# Patient Record
Sex: FEMALE | Race: WHITE | NOT HISPANIC OR LATINO | Employment: OTHER | URBAN - METROPOLITAN AREA
[De-identification: names, ages, dates, MRNs, and addresses within clinical notes are randomized per-mention and may not be internally consistent; named-entity substitution may affect disease eponyms.]

---

## 2017-08-30 ENCOUNTER — HOSPITAL ENCOUNTER (OUTPATIENT)
Dept: OTHER | Facility: HOSPITAL | Age: 82
Discharge: HOME | End: 2017-08-30
Attending: FAMILY MEDICINE | Admitting: FAMILY MEDICINE

## 2017-09-06 ENCOUNTER — HOSPITAL ENCOUNTER (OUTPATIENT)
Dept: OTHER | Facility: HOSPITAL | Age: 82
Discharge: HOME | End: 2017-09-06
Attending: FAMILY MEDICINE | Admitting: FAMILY MEDICINE

## 2017-10-13 ENCOUNTER — HOSPITAL ENCOUNTER (OUTPATIENT)
Dept: OTHER | Facility: HOSPITAL | Age: 82
Discharge: HOME | End: 2017-10-13
Attending: NEUROLOGICAL SURGERY | Admitting: NEUROLOGICAL SURGERY

## 2017-12-11 ENCOUNTER — HOSPITAL ENCOUNTER (OUTPATIENT)
Dept: RESPIRATORY THERAPY | Facility: HOSPITAL | Age: 82
Discharge: HOME | End: 2017-12-11
Attending: INTERNAL MEDICINE | Admitting: INTERNAL MEDICINE

## 2017-12-14 ENCOUNTER — HOSPITAL ENCOUNTER (OUTPATIENT)
Dept: OTHER | Facility: HOSPITAL | Age: 82
Discharge: HOME | End: 2017-12-14
Attending: INTERNAL MEDICINE | Admitting: INTERNAL MEDICINE

## 2018-11-19 ENCOUNTER — CONVERSION ENCOUNTER (OUTPATIENT)
Dept: FAMILY MEDICINE CLINIC | Facility: CLINIC | Age: 83
End: 2018-11-19

## 2018-11-19 LAB — HEMOCCULT STL QL IA: NEGATIVE

## 2018-11-20 LAB
BASOPHILS # BLD AUTO: 20 CELLS/UL (ref 0–200)
BASOPHILS NFR BLD AUTO: 0.3 %
EOSINOPHIL # BLD AUTO: 0.4 %
EOSINOPHIL # BLD AUTO: 27 CELLS/UL (ref 15–500)
ERYTHROCYTE [DISTWIDTH] IN BLOOD BY AUTOMATED COUNT: 12.3 % (ref 11–15)
HCT VFR BLD AUTO: 28 % (ref 35–45)
HGB BLD-MCNC: 9.6 G/DL (ref 11.7–15.5)
LYMPHOCYTES # BLD AUTO: 2060 CELLS/UL (ref 850–3900)
LYMPHOCYTES NFR BLD AUTO: 30.3 %
MCH RBC QN AUTO: 34 PG (ref 27–33)
MCHC RBC AUTO-ENTMCNC: 34.3 G/DL (ref 32–36)
MCV RBC AUTO: 99.3 FL (ref 80–100)
MONOCYTES # BLD AUTO: 598 CELLS/UL (ref 200–950)
MONOCYTES NFR BLD AUTO: 8.8 %
NEUTROPHILS # BLD AUTO: 4094 CELLS/UL (ref 1500–7800)
NEUTROPHILS NFR BLD AUTO: 60.2 %
PLATELET # BLD AUTO: 225 10*3/UL (ref 140–400)
PMV BLD AUTO: 11 FL (ref 7.5–12.5)
RBC # BLD AUTO: 2.82 MILLION/UL (ref 3.8–5.1)
WBC # BLD AUTO: 6.8 10*3/UL (ref 3.8–10.8)

## 2018-12-17 ENCOUNTER — CONVERSION ENCOUNTER (OUTPATIENT)
Dept: FAMILY MEDICINE CLINIC | Facility: CLINIC | Age: 83
End: 2018-12-17

## 2018-12-18 LAB
BASOPHILS # BLD AUTO: 19 CELLS/UL (ref 0–200)
BASOPHILS NFR BLD AUTO: 0.3 %
EOSINOPHIL # BLD AUTO: 1.3 %
EOSINOPHIL # BLD AUTO: 83 CELLS/UL (ref 15–500)
ERYTHROCYTE [DISTWIDTH] IN BLOOD BY AUTOMATED COUNT: 12.7 % (ref 11–15)
HCT VFR BLD AUTO: 27.3 % (ref 35–45)
HGB BLD-MCNC: 9.2 G/DL (ref 11.7–15.5)
LYMPHOCYTES # BLD AUTO: 2246 CELLS/UL (ref 850–3900)
LYMPHOCYTES NFR BLD AUTO: 35.1 %
MCH RBC QN AUTO: 33.5 PG (ref 27–33)
MCHC RBC AUTO-ENTMCNC: 33.7 G/DL (ref 32–36)
MCV RBC AUTO: 99.3 FL (ref 80–100)
MONOCYTES # BLD AUTO: 582 CELLS/UL (ref 200–950)
MONOCYTES NFR BLD AUTO: 9.1 %
NEUTROPHILS # BLD AUTO: 3469 CELLS/UL (ref 1500–7800)
NEUTROPHILS NFR BLD AUTO: 54.2 %
PLATELET # BLD AUTO: 206 10*3/UL (ref 140–400)
PMV BLD AUTO: 10.7 FL (ref 7.5–12.5)
RBC # BLD AUTO: 2.75 MILLION/UL (ref 3.8–5.1)
WBC # BLD AUTO: 6.4 10*3/UL (ref 3.8–10.8)

## 2019-01-21 ENCOUNTER — CONVERSION ENCOUNTER (OUTPATIENT)
Dept: FAMILY MEDICINE CLINIC | Facility: CLINIC | Age: 84
End: 2019-01-21

## 2019-01-22 LAB
FOLATE SERPL-MCNC: 7.6 NG/ML
IRON SATN MFR SERPL: 24 % (CALC) (ref 11–50)
IRON SERPL-MCNC: 61 MCG/DL (ref 45–160)
UIBC SERPL-MCNC: 251 UG/DL (ref 250–450)
VIT B12 SERPL-MCNC: 521 PG/ML (ref 200–1100)

## 2019-02-22 ENCOUNTER — HOSPITAL ENCOUNTER (OUTPATIENT)
Dept: OTHER | Facility: HOSPITAL | Age: 84
Discharge: HOME | End: 2019-02-22
Attending: FAMILY MEDICINE | Admitting: FAMILY MEDICINE

## 2019-05-21 ENCOUNTER — CONVERSION ENCOUNTER (OUTPATIENT)
Dept: FAMILY MEDICINE CLINIC | Facility: CLINIC | Age: 84
End: 2019-05-21

## 2019-05-22 LAB
BASOPHILS # BLD AUTO: 17 CELLS/UL (ref 0–200)
BASOPHILS NFR BLD AUTO: 0.2 %
EOSINOPHIL # BLD AUTO: 0.5 %
EOSINOPHIL # BLD AUTO: 43 CELLS/UL (ref 15–500)
ERYTHROCYTE [DISTWIDTH] IN BLOOD BY AUTOMATED COUNT: 11.9 % (ref 11–15)
HCT VFR BLD AUTO: 26.5 % (ref 35–45)
HGB BLD-MCNC: 9.1 G/DL (ref 11.7–15.5)
LYMPHOCYTES # BLD AUTO: 2176 CELLS/UL (ref 850–3900)
LYMPHOCYTES NFR BLD AUTO: 25.3 %
MCH RBC QN AUTO: 33.5 PG (ref 27–33)
MCHC RBC AUTO-ENTMCNC: 34.3 G/DL (ref 32–36)
MCV RBC AUTO: 97.4 FL (ref 80–100)
MONOCYTES # BLD AUTO: 585 CELLS/UL (ref 200–950)
MONOCYTES NFR BLD AUTO: 6.8 %
NEUTROPHILS # BLD AUTO: 5779 CELLS/UL (ref 1500–7800)
NEUTROPHILS NFR BLD AUTO: 67.2 %
PLATELET # BLD AUTO: 212 10*3/UL (ref 140–400)
PMV BLD AUTO: 11.4 FL (ref 7.5–12.5)
RBC # BLD AUTO: 2.72 MILLION/UL (ref 3.8–5.1)
WBC # BLD AUTO: 8.6 10*3/UL (ref 3.8–10.8)

## 2019-05-29 ENCOUNTER — HOSPITAL ENCOUNTER (OUTPATIENT)
Dept: OTHER | Facility: HOSPITAL | Age: 84
Discharge: HOME | End: 2019-05-29
Attending: FAMILY MEDICINE | Admitting: FAMILY MEDICINE

## 2019-06-04 ENCOUNTER — HOSPITAL ENCOUNTER (OUTPATIENT)
Dept: ONCOLOGY | Facility: CLINIC | Age: 84
Discharge: HOME | End: 2019-06-04
Attending: INTERNAL MEDICINE | Admitting: INTERNAL MEDICINE

## 2019-06-13 DIAGNOSIS — D64.9 ANEMIA, UNSPECIFIED TYPE: Primary | ICD-10-CM

## 2019-06-19 ENCOUNTER — RESULTS ENCOUNTER (OUTPATIENT)
Dept: ONCOLOGY | Facility: CLINIC | Age: 84
End: 2019-06-19

## 2019-06-19 ENCOUNTER — OFFICE VISIT (OUTPATIENT)
Dept: ONCOLOGY | Facility: CLINIC | Age: 84
End: 2019-06-19

## 2019-06-19 ENCOUNTER — DOCUMENTATION (OUTPATIENT)
Dept: ONCOLOGY | Facility: CLINIC | Age: 84
End: 2019-06-19

## 2019-06-19 VITALS
BODY MASS INDEX: 18.82 KG/M2 | DIASTOLIC BLOOD PRESSURE: 77 MMHG | WEIGHT: 106.2 LBS | TEMPERATURE: 98.1 F | SYSTOLIC BLOOD PRESSURE: 121 MMHG | HEIGHT: 63 IN | HEART RATE: 89 BPM

## 2019-06-19 DIAGNOSIS — D64.9 ANEMIA, UNSPECIFIED TYPE: ICD-10-CM

## 2019-06-19 DIAGNOSIS — R63.4 WEIGHT LOSS, ABNORMAL: ICD-10-CM

## 2019-06-19 DIAGNOSIS — D64.9 ANEMIA, UNSPECIFIED TYPE: Primary | ICD-10-CM

## 2019-06-19 DIAGNOSIS — D75.89 MACROCYTOSIS: ICD-10-CM

## 2019-06-19 LAB
BASOPHILS # BLD AUTO: 0.03 10*3/MM3 (ref 0–0.2)
BASOPHILS NFR BLD AUTO: 0.4 % (ref 0–1.5)
DEPRECATED RDW RBC AUTO: 47.9 FL (ref 37–54)
EOSINOPHIL # BLD AUTO: 0.02 10*3/MM3 (ref 0–0.4)
EOSINOPHIL NFR BLD AUTO: 0.3 % (ref 0.3–6.2)
ERYTHROCYTE [DISTWIDTH] IN BLOOD BY AUTOMATED COUNT: 13.5 % (ref 12.3–15.4)
HCT VFR BLD AUTO: 29 % (ref 34–46.6)
HGB BLD-MCNC: 9.5 G/DL (ref 12–15.9)
LYMPHOCYTES # BLD AUTO: 1.84 10*3/MM3 (ref 0.7–3.1)
LYMPHOCYTES NFR BLD AUTO: 23.1 % (ref 19.6–45.3)
MCH RBC QN AUTO: 33.6 PG (ref 26.6–33)
MCHC RBC AUTO-ENTMCNC: 32.8 G/DL (ref 31.5–35.7)
MCV RBC AUTO: 102.5 FL (ref 79–97)
MONOCYTES # BLD AUTO: 0.69 10*3/MM3 (ref 0.1–0.9)
MONOCYTES NFR BLD AUTO: 8.6 % (ref 5–12)
NEUTROPHILS # BLD AUTO: 5.4 10*3/MM3 (ref 1.7–7)
NEUTROPHILS NFR BLD AUTO: 67.6 % (ref 42.7–76)
PLATELET # BLD AUTO: 237 10*3/MM3 (ref 140–450)
PMV BLD AUTO: 9.6 FL (ref 6–12)
RBC # BLD AUTO: 2.83 10*6/MM3 (ref 3.77–5.28)
WBC NRBC COR # BLD: 7.98 10*3/MM3 (ref 3.4–10.8)

## 2019-06-19 PROCEDURE — 36415 COLL VENOUS BLD VENIPUNCTURE: CPT | Performed by: INTERNAL MEDICINE

## 2019-06-19 PROCEDURE — 99215 OFFICE O/P EST HI 40 MIN: CPT | Performed by: INTERNAL MEDICINE

## 2019-06-19 PROCEDURE — 38222 DX BONE MARROW BX & ASPIR: CPT | Performed by: INTERNAL MEDICINE

## 2019-06-19 PROCEDURE — 85025 COMPLETE CBC W/AUTO DIFF WBC: CPT | Performed by: INTERNAL MEDICINE

## 2019-06-19 RX ORDER — MECLIZINE HYDROCHLORIDE 25 MG/1
25 TABLET ORAL 3 TIMES DAILY PRN
COMMUNITY
End: 2019-07-10

## 2019-06-19 RX ORDER — OMEPRAZOLE 20 MG/1
20 CAPSULE, DELAYED RELEASE ORAL DAILY
COMMUNITY
End: 2019-09-11

## 2019-06-19 NOTE — PROGRESS NOTES
Hematology-Oncology Follow-up Note       Devon Crane  8/2/1932    Primary Care Physician: Devonte Emanuel MD  Referring Physician: Devonte Emanuel MD  Reason For Visit:    Chief Complaint   Patient presents with   • Follow-up     anemia, bone marrow biopsy       HPI  Ms. Crane is an 86-year-old female who presents to our office on   6/4/19 for evaluation of anemia.  She had a CBC on 5/29/19 at her PCP, Dr. Mcdaniel’s office that   showed a hemoglobin of 9.7.  B12 and folate levels were checked and they came back normal.    Creatinine was 1.01 with gfr 50.  LFTs were normal.  Her hemoglobin on 5/25/19, during a recent   hospitalization, was 9.1.  TSH was also normal on 5/27/18.  B12 was 328 (low-normal) on 5/26/18.    Patient was referred to us for further evaluation.    · 5/25/18 - WBC 5.7, hemoglobin 9.1, platelet count 184,000, .4.  Creatinine 0.9, BUN 10,   albumin 3.2, globulin 2.7, total protein 5.8.    · 5/26/18 - B12 320.  Folate 9.5.    · 5/27/19 - TSH 1.85.    · 5/29/19 - WBC 7.7, hemoglobin 9.7, platelet count 264,000, MCV 98.3.  Creatinine 1.01, BUN 15,   albumin 3.9, globulin 3.7, bilirubin 0.5, AST 15, ALT 9, alkaline phosphatase 46.  Absolute retic   count 43,950.  Retic count percent is 1.5%.  BNP 63.  · 5/29/19 - Chest x-ray - Emphysema.       6/4/2019 - The patient presents for initial consultation.  She reports fatigue.  Reports weight loss.  Decreased appetite She denies any   bleeding per rectum.  Stool Hemoccult test was checked at Dr. Mcdaniel’s office this past week and   it was negative x3.  She reports vaginal bleeding, about twice a week, during the past few   months.  Patient says it is minimal.  She had a hysterectomy in 1963.    6/4/2019 , reticulocyte count 1.7%, ferritin 180, iron saturation 9% low, TIBC 223 low, serum iron 20 low, ferritin 180  Haptoglobin 208,  Serum copper 104 normal    6/19/2019  WBC 7.9, hemoglobin 9.5, .5, platelet count 237    Subjective      Patient here for follow up, accompanied by family.  Family reports that patient continues to lose weight.  She lost another 3 pounds since the last visit.  Her appetite is poor.  She is also very tired.       Past Medical History:   Diagnosis Date   • Anemia    • CAD (coronary artery disease)    • COPD (chronic obstructive pulmonary disease) (CMS/HCC)    • Dyslipidemia    • GERD (gastroesophageal reflux disease)    • Hearing loss    • Osteoarthritis    • Paroxysmal A-fib (CMS/HCC)    • Prolapse of female bladder, acquired    • Pulmonary hypertension (CMS/HCC)        Past Surgical History:   Procedure Laterality Date   • APPENDECTOMY     • CORONARY ARTERY BYPASS GRAFT  2013   • CYSTOCELE REPAIR     • EYE LENS IMPLANT SECONDARY     • HYSTERECTOMY     • KNEE CARTILAGE SURGERY Right    • SKIN CANCER EXCISION      head and face         Current Outpatient Medications:   •  B Complex Vitamins (VITAMIN-B COMPLEX PO), Take  by mouth Daily., Disp: , Rfl:   •  Denosumab (PROLIA SC), Inject  under the skin into the appropriate area as directed Every 6 (Six) Months., Disp: , Rfl:   •  Esomeprazole Magnesium (NEXIUM PO), Take  by mouth Daily., Disp: , Rfl:   •  IBUPROFEN IB PO, Take  by mouth As Needed., Disp: , Rfl:   •  meclizine (ANTIVERT) 25 MG tablet, Take 25 mg by mouth 3 (Three) Times a Day As Needed for dizziness., Disp: , Rfl:   •  Multiple Vitamins-Minerals (MULTIVITAMIN ADULT PO), Take  by mouth., Disp: , Rfl:   •  omeprazole (priLOSEC) 20 MG capsule, Take 20 mg by mouth Daily., Disp: , Rfl:   •  Parenteral Electrolytes (NUTRILYTE IV), Infuse  into a venous catheter Daily., Disp: , Rfl:   •  SIMVASTATIN PO, Take 40 mg by mouth Daily., Disp: , Rfl:   •  Unable to find, 1 each 1 (One) Time. Med Name: Nutrilite, Disp: , Rfl:     Allergies   Allergen Reactions   • Benadryl [Diphenhydramine] Unknown (See Comments)     Unknown.        Family History   Problem Relation Age of Onset   • Cancer Sister    • Cancer Brother   "      Cancer-related family history includes Cancer in her brother and sister.    Social History     Tobacco Use   • Smoking status: Never Smoker   Substance Use Topics   • Alcohol use: No     Frequency: Never   • Drug use: No           Review of Systems   Constitutional: Positive for fatigue and unexpected weight change. Negative for activity change, chills and fever.   HENT: Negative for ear discharge, mouth sores, nosebleeds and sore throat.    Eyes: Negative for photophobia and visual disturbance.   Respiratory: Positive for shortness of breath (reports shortness of breath; wears 2L oxygen by nasal cannula, order Dr. Mcdaniel). Negative for cough, choking and wheezing.    Cardiovascular: Negative for chest pain and palpitations.   Gastrointestinal: Positive for abdominal pain. Negative for diarrhea, nausea and vomiting.        Poor appetite,   Genitourinary: Positive for vaginal bleeding. Negative for dysuria.   Musculoskeletal: Negative for neck stiffness.   Skin: Negative for rash.   Neurological: Negative for seizures, syncope and speech difficulty.   Psychiatric/Behavioral: Negative for agitation, confusion and hallucinations.       Objective:    Vitals:    06/19/19 1253   BP: 121/77   Pulse: 89   Temp: 98.1 °F (36.7 °C)   Weight: 48.2 kg (106 lb 3.2 oz)   Height: 160 cm (63\")   PainSc: 0-No pain       (0) Fully active, able to carry on all predisease performance without restriction    Physical Exam   Constitutional: She is oriented to person, place, and time. She appears well-developed and well-nourished.   Appears frail.,  Elderly female with unsteady gait   HENT:   Head: Normocephalic and atraumatic.   Nose: Nose normal.   Mouth/Throat: Oropharynx is clear and moist. No oropharyngeal exudate.   Eyes: Conjunctivae and EOM are normal. No scleral icterus.   Neck: Normal range of motion. No tracheal deviation present. No thyromegaly present.   Cardiovascular: Regular rhythm and normal heart sounds. "   Pulmonary/Chest: Effort normal and breath sounds normal. No stridor.   Abdominal: Soft. Bowel sounds are normal. She exhibits no distension and no mass. There is no tenderness.   Musculoskeletal: Normal range of motion. She exhibits no edema or deformity.   Lymphadenopathy:     She has no cervical adenopathy.   Neurological: She is alert and oriented to person, place, and time. No sensory deficit.   Skin: Skin is warm.   Psychiatric: Her behavior is normal.   Vitals reviewed.        Results from last 7 days   Lab Units 06/19/19  1342   WBC 10*3/mm3 7.98   HEMOGLOBIN g/dL 9.5*   HEMATOCRIT % 29.0*   PLATELETS 10*3/mm3 237   MCV fL 102.5*           Invalid input(s): LABALBU, PROT    Assessment     1.  Macrocytic anemia: Hemoglobin was 9.7 on 5/29/19.  She has slight macrocytosis.  B12 was low-normal and folate was normal.  She could have borderline CKD, with creatinine of 1.01 and GFR <60.    Iron panel was more consistent with anemia of chronic disease (ferritin was normal but iron sat TIBC was low).  Copper level was normal.   She reports vaginal bleeding/spotting. Bleeding may also be contributing. Also   anemia of mild CKD is possible.  Will rule out bone marrow disorder.  Anemia due to malignancy is also possible.    2. Vaginal bleeding:  Will need further evaluation.    3.  Weight loss:-Patient has experienced significant weight loss over the past 6 to 12 months.  She also has been losing appetite, also getting weaker.  All the symptoms raise a concern for malignancy.     PLAN:  1.  We will perform bone marrow aspiration biopsy today.  2.  Recommend CT chest abdomen and pelvis to rule out malignancy..   3.. She will need a gynecological exam.  Will have this arranged through Dr. Mcdaniel’s office,  We will follow patient in about 2 weeks.            Bone marrow aspiration  Date/Time: 6/19/2019 3:39 PM  Performed by: Devonte Emanuel MD  Authorized by: Devonte Emanuel MD   Local anesthesia used: yes  Anesthesia: local  infiltration    Anesthesia:  Local anesthesia used: yes  Local Anesthetic: lidocaine 1% without epinephrine  Anesthetic total: 10 mL    Sedation:  Patient sedated: no    Patient tolerance: Patient tolerated the procedure well with no immediate complications  Comments: Bone marrow aspiration was performed using a illinois needle .  Field was sterilized prior to the procedure and it was done under aseptic precautions.  Sample obtained for flow cytometry, histopathology and cytogenetics.  Sample was obtained from the right posterior iliac crest.    Bone marrow biopsy w/ aspiration  Date/Time: 6/19/2019 3:00 PM  Performed by: Devonte Emanuel MD  Authorized by: Devonte Emanuel MD   Preparation: Patient was prepped and draped in the usual sterile fashion.  Local anesthesia used: yes    Anesthesia:  Local anesthesia used: yes  Local Anesthetic: lidocaine 1% without epinephrine  Anesthetic total: 10 mL    Sedation:  Patient sedated: no    Patient tolerance: Patient tolerated the procedure well with no immediate complications  Comments: Bone marrow biopsy was performed using  a Jamshidi needle .  Field was sterilized prior to the procedure and it was done under aseptic precautions.  Sample obtained for flow cytometry, histopathology and cytogenetics.  Sample was obtained from the right posterior iliac crest.            Orders Placed This Encounter   Procedures   • CT Chest With & Without Contrast     Standing Status:   Future     Standing Expiration Date:   6/19/2020   • CT Abdomen Pelvis With & Without Contrast     Standing Status:   Future     Standing Expiration Date:   6/19/2020     Order Specific Question:   Will Oral Contrast be needed for this procedure?     Answer:   No   • CBC Auto Differential   • Comprehensive Metabolic Panel   • [facility] Requisition ([facility] Only)     Please perform 5500-4 and 5250-6  Patient had BM BX & Asp on 6/19/19 at 3:00 to the right iliac with Dr. Emanuel.     Standing Status:   Future      Standing Expiration Date:   6/19/2020   • Bone marrow aspiration     This order was created via procedure documentation   • Bone marrow biopsy w/ aspiration     This order was created via procedure documentation                Thank you very much for providing the opportunity to participate in this patient’s care. Please do not hesitate to call if there are any other questions

## 2019-06-20 NOTE — PROGRESS NOTES
"    ============    BONE MARROW PROCEDURE      =========      LOCATION:  {Location:19149::\"Office\"}    INDICATIONS:  ***    PROCEDURE:  After informed consent was obtained, the skin overlying the { Posterior:43380} buttock was prepped and draped in sterile fashion.  A \"time-out\" was called at ***  a.m./p.m.    Patient identity was confirmed by:  *** confirmed name and  on ID band  Correct site confirmed as: *** {BH Posterior:02640} posterior iliac crest  Procedure confirmed as: *** Bone Marrow Biopsy with Aspiration    At  ***   a.m./p.m., pt was placed in a { Posterior:64658} lateral decubitus position and area was cleaned and draped in a sterile fashion and 2% plain Xylocaine was infiltrated into the skin and subcutaneous tissue.  Using a 15 gauge aspirate needle, the marrow cavity was entered and marrow withdrawn without complication. Then using an 11 gauge 4 inch needle, bone marrow biopsy was performed. Pressure was applied on the area to ensure hemostasis and followed by a sterile dressing. Patient was asked to lay on his back for half hour post procedure.  The patient tolerated the procedure well.       Devonte Emanuel MD   2019           ============    BONE MARROW PROCEDURE      =========      LOCATION:  {Location:27640::\"Office\"}    INDICATIONS:  ***    PROCEDURE:  After informed consent was obtained, the skin overlying the { Posterior:34217} buttock was prepped and draped in sterile fashion.  A \"time-out\" was called at ***  a.m./p.m.    Patient identity was confirmed by:  *** confirmed name and  on ID band  Correct site confirmed as: *** { Posterior:18611} posterior iliac crest  Procedure confirmed as: *** Bone Marrow Biopsy with Aspiration    At  ***   a.m./p.m., pt was placed in a { Posterior:50235} lateral decubitus position and area was cleaned and draped in a sterile fashion and 2% plain Xylocaine was infiltrated into the skin and subcutaneous tissue.  Using a 15 gauge aspirate " "needle, the marrow cavity was entered and marrow withdrawn without complication. Then using an 11 gauge 4 inch needle, bone marrow biopsy was performed. Pressure was applied on the area to ensure hemostasis and followed by a sterile dressing. Patient was asked to lay on his back for half hour post procedure.  The patient tolerated the procedure well.       Devonte Emanuel MD   6/19/2019           ============    BONE MARROW PROCEDURE      =========      LOCATION:  {Location:57951::\"Office\"}    INDICATIONS:  anemia    PROCEDURE:  After informed consent was obtained, the skin overlying the right posterior superior illiac crest buttock was prepped and draped in sterile fashion.  A \"time-out\" was called at 300/p.m.      Pt was placed in a left lateral decubitus position and area was cleaned and draped in a sterile fashion and 2% plain Xylocaine was infiltrated into the skin and subcutaneous tissue.  Using a 15 gauge aspirate needle, the marrow cavity was entered and marrow withdrawn without complication. Then using an 11 gauge 4 inch needle, bone marrow biopsy was performed. Pressure was applied on the area to ensure hemostasis and followed by a sterile dressing. Patient was asked to lay on his back for half hour post procedure.  The patient tolerated the procedure well.       Devonte Emanuel MD   6/19/2019         "

## 2019-06-25 ENCOUNTER — TELEPHONE (OUTPATIENT)
Dept: FAMILY MEDICINE CLINIC | Facility: CLINIC | Age: 84
End: 2019-06-25

## 2019-06-25 NOTE — TELEPHONE ENCOUNTER
Spoke with daughter Mecca.We received a office note from hemoatology office,  Stating Devon  was having vaginal bleeding, if so  we need to work this up with an pelvic u/s and  possible biopsy.    Mecca report that is was a misunderstanding. Devon was asked if she had any  vaginal bleeding, and she tired to explain , she did not have vaginal bleeding, but she some times has vaginal discharge, which is usually due to needing to clean pessary. Mecca reported she discuss this with her mom   after appointment and at next appointment she try to explain to   doctor , there has never been any vaginal bleeding.

## 2019-06-27 ENCOUNTER — TELEPHONE (OUTPATIENT)
Dept: ONCOLOGY | Facility: CLINIC | Age: 84
End: 2019-06-27

## 2019-07-02 ENCOUNTER — HOSPITAL ENCOUNTER (OUTPATIENT)
Dept: PET IMAGING | Facility: HOSPITAL | Age: 84
Discharge: HOME | End: 2019-07-02
Admitting: NURSE PRACTITIONER

## 2019-07-02 DIAGNOSIS — R63.4 WEIGHT LOSS, ABNORMAL: ICD-10-CM

## 2019-07-02 DIAGNOSIS — R63.4 WEIGHT LOSS, ABNORMAL: Primary | ICD-10-CM

## 2019-07-02 LAB — CREAT BLDA-MCNC: 0.8 MG/DL (ref 0.6–1.3)

## 2019-07-02 PROCEDURE — 0 IOPAMIDOL PER 1 ML: Performed by: NURSE PRACTITIONER

## 2019-07-02 PROCEDURE — 71260 CT THORAX DX C+: CPT

## 2019-07-02 PROCEDURE — 74177 CT ABD & PELVIS W/CONTRAST: CPT

## 2019-07-02 PROCEDURE — 82565 ASSAY OF CREATININE: CPT

## 2019-07-02 RX ADMIN — IOPAMIDOL 100 ML: 755 INJECTION, SOLUTION INTRAVENOUS at 10:15

## 2019-07-10 ENCOUNTER — OFFICE VISIT (OUTPATIENT)
Dept: ONCOLOGY | Facility: CLINIC | Age: 84
End: 2019-07-10

## 2019-07-10 ENCOUNTER — APPOINTMENT (OUTPATIENT)
Dept: LAB | Facility: HOSPITAL | Age: 84
End: 2019-07-10

## 2019-07-10 VITALS
DIASTOLIC BLOOD PRESSURE: 69 MMHG | SYSTOLIC BLOOD PRESSURE: 103 MMHG | HEART RATE: 81 BPM | TEMPERATURE: 97.5 F | RESPIRATION RATE: 18 BRPM | BODY MASS INDEX: 18.68 KG/M2 | HEIGHT: 63 IN | WEIGHT: 105.4 LBS

## 2019-07-10 DIAGNOSIS — D64.9 ANEMIA, UNSPECIFIED TYPE: Primary | ICD-10-CM

## 2019-07-10 DIAGNOSIS — D46.20 MDS (MYELODYSPLASTIC SYNDROME), LOW GRADE (HCC): ICD-10-CM

## 2019-07-10 PROBLEM — N93.9 VAGINAL BLEEDING: Status: ACTIVE | Noted: 2019-07-10

## 2019-07-10 PROBLEM — D46.9 MDS (MYELODYSPLASTIC SYNDROME) (HCC): Status: ACTIVE | Noted: 2019-07-10

## 2019-07-10 LAB
ALBUMIN SERPL-MCNC: 3.5 G/DL (ref 3.5–4.8)
ALBUMIN/GLOB SERPL: 0.9 G/DL (ref 1–1.7)
ALP SERPL-CCNC: 42 U/L (ref 32–91)
ALT SERPL W P-5'-P-CCNC: 10 U/L (ref 14–54)
ANION GAP SERPL CALCULATED.3IONS-SCNC: 13 MMOL/L (ref 5–15)
AST SERPL-CCNC: 17 U/L (ref 15–41)
BASOPHILS # BLD AUTO: 0.02 10*3/MM3 (ref 0–0.2)
BASOPHILS NFR BLD AUTO: 0.3 % (ref 0–1.5)
BILIRUB SERPL-MCNC: 0.6 MG/DL (ref 0.3–1.2)
BUN BLD-MCNC: 9 MG/DL (ref 8–20)
BUN/CREAT SERPL: 11.3 (ref 5.4–26.2)
CALCIUM SPEC-SCNC: 8.7 MG/DL (ref 8.9–10.3)
CHLORIDE SERPL-SCNC: 104 MMOL/L (ref 101–111)
CO2 SERPL-SCNC: 23 MMOL/L (ref 22–32)
CREAT BLD-MCNC: 0.8 MG/DL (ref 0.4–1)
DEPRECATED RDW RBC AUTO: 48.7 FL (ref 37–54)
EOSINOPHIL # BLD AUTO: 0.04 10*3/MM3 (ref 0–0.4)
EOSINOPHIL NFR BLD AUTO: 0.5 % (ref 0.3–6.2)
ERYTHROCYTE [DISTWIDTH] IN BLOOD BY AUTOMATED COUNT: 13.6 % (ref 12.3–15.4)
GFR SERPL CREATININE-BSD FRML MDRD: 68 ML/MIN/1.73
GLOBULIN UR ELPH-MCNC: 3.9 GM/DL (ref 2.5–3.8)
GLUCOSE BLD-MCNC: 106 MG/DL (ref 65–99)
HCT VFR BLD AUTO: 30.5 % (ref 34–46.6)
HGB BLD-MCNC: 10 G/DL (ref 12–15.9)
LYMPHOCYTES # BLD AUTO: 2.11 10*3/MM3 (ref 0.7–3.1)
LYMPHOCYTES NFR BLD AUTO: 28.4 % (ref 19.6–45.3)
MCH RBC QN AUTO: 33.7 PG (ref 26.6–33)
MCHC RBC AUTO-ENTMCNC: 32.8 G/DL (ref 31.5–35.7)
MCV RBC AUTO: 102.7 FL (ref 79–97)
MONOCYTES # BLD AUTO: 0.61 10*3/MM3 (ref 0.1–0.9)
MONOCYTES NFR BLD AUTO: 8.2 % (ref 5–12)
NEUTROPHILS # BLD AUTO: 4.64 10*3/MM3 (ref 1.7–7)
NEUTROPHILS NFR BLD AUTO: 62.6 % (ref 42.7–76)
PLATELET # BLD AUTO: 264 10*3/MM3 (ref 140–450)
PMV BLD AUTO: 9.6 FL (ref 6–12)
POTASSIUM BLD-SCNC: 5 MMOL/L (ref 3.6–5.1)
PROT SERPL-MCNC: 7.4 G/DL (ref 6.1–7.9)
RBC # BLD AUTO: 2.97 10*6/MM3 (ref 3.77–5.28)
SODIUM BLD-SCNC: 135 MMOL/L (ref 136–144)
WBC NRBC COR # BLD: 7.42 10*3/MM3 (ref 3.4–10.8)

## 2019-07-10 PROCEDURE — 85025 COMPLETE CBC W/AUTO DIFF WBC: CPT | Performed by: INTERNAL MEDICINE

## 2019-07-10 PROCEDURE — 36415 COLL VENOUS BLD VENIPUNCTURE: CPT | Performed by: INTERNAL MEDICINE

## 2019-07-10 PROCEDURE — 80053 COMPREHEN METABOLIC PANEL: CPT | Performed by: INTERNAL MEDICINE

## 2019-07-10 PROCEDURE — 99215 OFFICE O/P EST HI 40 MIN: CPT | Performed by: INTERNAL MEDICINE

## 2019-07-10 RX ORDER — SIMVASTATIN 40 MG
40 TABLET ORAL DAILY
Refills: 4 | COMMUNITY
Start: 2019-06-22 | End: 2020-04-06

## 2019-07-10 RX ORDER — ASPIRIN 81 MG/1
81 TABLET, CHEWABLE ORAL EVERY OTHER DAY
COMMUNITY
End: 2022-01-01

## 2019-07-10 NOTE — PROGRESS NOTES
Hematology-Oncology Follow-up Note     Name: Devon Crane   : 1932   MRN;[id number]    Primary Care Physician: Devonte Emanuel MD  Referring Physician: Devonte Emanuel MD    Reason For Visit:  Chief Complaint   Patient presents with   • Follow-up     anemia     HPI:   Ms. Crane is an 86 y.o. female who presents to our office on 19 for evaluation of anemia.  She had a CBC on 19 at her PCP, Dr. Mcdaniel’s office that showed a hemoglobin of 9.7.  B12 and folate levels were checked and they came back normal.  Creatinine was 1.01 with gfr 50.  LFTs were normal.  Her hemoglobin on 19, during a recent hospitalization, was 9.1.  TSH was also normal on 18.  B12 was 328 (low-normal) on 18.  Patient was referred to us for further evaluation.    • 18 - WBC 5.7, hemoglobin 9.1, platelet count 184,000, .4.  Creatinine 0.9, BUN 10, albumin 3.2, globulin 2.7, total protein 5.8.    • 18 - B12 320.  Folate 9.5.    • 19 - TSH 1.85.    • 19 - WBC 7.7, hemoglobin 9.7, platelet count 264,000, MCV 98.3.  Creatinine 1.01, BUN 15, albumin 3.9, globulin 3.7, bilirubin 0.5, AST 15, ALT 9, alkaline phosphatase 46.  Absolute retic count 43,950.  Retic count percent is 1.5%.  BNP 63.  • 19 - Chest x-ray - Emphysema.   • 2019 - The patient presents for initial consultation.  She reports fatigue.  She denies any bleeding per rectum.  Stool Hemoccult test was checked at Dr. Mcdaniel’s office this past week and it was negative x3.  She reports vaginal bleeding, about twice a week, during the past few months.  Patient says it is minimal.  She had a hysterectomy in .   • 19 -- Bone marrow biopsy -hypercellular marrow with maturing trilineage hematopoiesis, dyserythropoiesis including ringed  fibroblast and mild megakaryocytic atypia.  Marrow cellularity: 60 to 70%; blasts equals 1%; iron storage: present with ring sideroblasts (less than 15%); marrow fibrosis absent;  Cytogenetics: Normal female karyotype; FISH: Normal MDS panel; next generation sequencing: Detected genomic alterations-SF3B1 p.[id number], TET2 p.?, one unclear variant in DNMT3A; No evidence of acute leukemia, metastatic carcinoma, plasma cell neoplasm, or lymphoma.  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  Ring sideroblasts are present in less than 15% of erythroid precursors, which does not meet the criteria for MDS-ROS without the status is of SF 3B1 mutation.  IPSS-R score: 2.64, IPSS-R category: Low. Blood: hemoglobin 9.5, platelet count 237,,000, ANC 5.4.   • 7/2/19 -- CT CAP - No evidence of active cardiopulmonary disease. Old changes of CABG and COPD. No acute upper abdominal finding. . Degenerative changes of the thoracic or lumbar spine with old compressions of upper thoracic midthoracic and lower thoracic vertebral bodies without findings suggesting acute compression.No acute abdominal or pelvic abnormality. Status post cholecystectomy and hysterectomy. Extensive lumbar degenerative changes with numerous chronicompression deformities. There is resultant multilevel spinal canal and neural foraminal narrowing.       Subjective:   The patient is here for a scheduled follow up.  Since her last office visit she underwent bone marrow biopsy and a CT scan of the chest, abdomen, and pelvis.  At the last office visit the patient had reported vaginal bleeding; however, Dr. Mcdaniel's office clarified and the patient was having vaginal discharge which is usually due to her needing to clean her pessary.  Patient stated she had never had any recent vaginal bleeding. She had no changes in her health and was feeling well other than some mild fatigue and chronic stable shortness of air. She remained oxygen dependent. Reports fatigue    The  following portions of the patient's history were reviewed and updated as appropriate: allergies, current medications, past family history, past medical history, past social history, past surgical history and problem list.     Past Medical History:   Diagnosis Date   • Anemia    • CAD (coronary artery disease)    • COPD (chronic obstructive pulmonary disease) (CMS/HCC)    • Dyslipidemia    • GERD (gastroesophageal reflux disease)    • Hearing loss    • Osteoarthritis    • Oxygen dependent    • Paroxysmal A-fib (CMS/HCC)    • Presence of pessary    • Prolapse of female bladder, acquired    • Pulmonary hypertension (CMS/HCC)    • Vaginal bleeding        Past Surgical History:   Procedure Laterality Date   • APPENDECTOMY     • CORONARY ARTERY BYPASS GRAFT  2013   • CYSTOCELE REPAIR     • EYE LENS IMPLANT SECONDARY     • HYSTERECTOMY     • KNEE CARTILAGE SURGERY Right    • SKIN CANCER EXCISION      head and face         Current Outpatient Medications:   •  aspirin 81 MG chewable tablet, Chew 81 mg Daily., Disp: , Rfl:   •  B Complex Vitamins (VITAMIN-B COMPLEX PO), Take  by mouth Daily., Disp: , Rfl:   •  Denosumab (PROLIA SC), Inject  under the skin into the appropriate area as directed Every 6 (Six) Months., Disp: , Rfl:   •  Esomeprazole Magnesium (NEXIUM PO), Take  by mouth Daily., Disp: , Rfl:   •  IBUPROFEN IB PO, Take  by mouth As Needed., Disp: , Rfl:   •  Multiple Vitamins-Minerals (MULTIVITAMIN ADULT PO), Take  by mouth., Disp: , Rfl:   •  omeprazole (priLOSEC) 20 MG capsule, Take 20 mg by mouth Daily., Disp: , Rfl:   •  Parenteral Electrolytes (NUTRILYTE IV), Infuse  into a venous catheter Daily., Disp: , Rfl:   •  simvastatin (ZOCOR) 40 MG tablet, Take 40 mg by mouth Daily., Disp: , Rfl: 4    Allergies   Allergen Reactions   • Diphenhydramine Swelling     Unknown.        Family History   Problem Relation Age of Onset   • Cancer Sister    • Cancer Brother        Cancer-related family history includes Cancer in  "her brother and sister.    Social History     Tobacco Use   • Smoking status: Never Smoker   • Smokeless tobacco: Never Used   Substance Use Topics   • Alcohol use: No     Frequency: Never   • Drug use: No         ROS:   Review of Systems   Constitutional: Positive for fatigue. Negative for chills and fever.   HENT: Negative for ear pain, mouth sores, nosebleeds and sore throat.    Eyes: Negative for photophobia and visual disturbance.   Respiratory: Positive for shortness of breath ( ). Negative for wheezing and stridor.    Cardiovascular: Negative for chest pain and palpitations.   Gastrointestinal: Negative for abdominal pain, diarrhea, nausea and vomiting.   Endocrine: Negative for cold intolerance and heat intolerance.   Genitourinary: Negative for dysuria and hematuria.   Musculoskeletal: Negative for joint swelling and neck stiffness.   Skin: Negative for color change and rash.   Neurological: Negative for seizures and syncope.   Hematological: Negative for adenopathy.        No obvious bleeding   Psychiatric/Behavioral: Negative for agitation, confusion and hallucinations.   All other systems reviewed and are negative.      Objective:  Vitals:  Vitals:    07/10/19 1254   BP: 103/69   Pulse: 81   Resp: 18   Temp: 97.5 °F (36.4 °C)   Weight: 47.8 kg (105 lb 6.4 oz)   Height: 160 cm (63\")   PainSc: 0-No pain     Body mass index is 18.67 kg/m².      Physical Exam:   Physical Exam   Constitutional: She is oriented to person, place, and time. She appears well-developed and well-nourished. No distress.   HENT:   Head: Normocephalic and atraumatic.   Eyes: Conjunctivae and EOM are normal. Right eye exhibits no discharge. Left eye exhibits no discharge. No scleral icterus.   Neck: Normal range of motion. Neck supple. No thyromegaly present.   Cardiovascular: Normal rate, regular rhythm, normal heart sounds and intact distal pulses. Exam reveals no gallop and no friction rub.   Pulmonary/Chest: Effort normal. No " stridor. No respiratory distress. She has no wheezes.   On 2 liters oxygen via nasal cannula    Abdominal: Soft. Bowel sounds are normal. She exhibits no mass. There is no tenderness. There is no rebound and no guarding.   Musculoskeletal: Normal range of motion. She exhibits no tenderness.   Lymphadenopathy:     She has no cervical adenopathy.   Neurological: She is alert and oriented to person, place, and time. She exhibits normal muscle tone.   Skin: Skin is warm. No rash noted. She is not diaphoretic. No erythema.   Psychiatric: She has a normal mood and affect. Her behavior is normal.   Nursing note and vitals reviewed.        Lab Results - Last 18 Months   Lab Units 07/10/19  1259 06/19/19  1342 03/19/19  0000   WBC 10*3/mm3 7.42 7.98 7.2   HEMOGLOBIN g/dL 10.0* 9.5* 9.5*   HEMATOCRIT % 30.5* 29.0* 28.2*   PLATELETS 10*3/mm3 264 237 230   MCV fL 102.7* 102.5* 98.6     Lab Results - Last 18 Months   Lab Units 07/10/19  1259 07/02/19  1007 11/12/18  0000 07/11/18  0000 05/27/18  0432  05/25/18  2354   SODIUM mmol/L 135*  --  137 138 137  --  140   POTASSIUM mmol/L 5.0  --  4.2 4.2 4.9   < > 3.7   CHLORIDE mmol/L 104  --  101 108 108  --  111   TOTAL CO2 mmol/L  --   --  27 25 25  --  24   CO2 mmol/L 23.0  --   --   --   --   --   --    BUN mg/dL 9  --  13 12 11  --  10   CREATININE mg/dL 0.80 0.80 1.0 1.0 0.8  --  0.9   CALCIUM mg/dL 8.7*  --  9.7 8.8 8.2*  --  8.0*   BILIRUBIN mg/dL 0.6  --  0.7 0.4  --   --  0.8   ALK PHOS U/L 42  --  46 40  --   --  42   ALT (SGPT) U/L 10*  --   --   --   --   --  12*   AST (SGOT) U/L 17  --  18 19  --   --  22   GLUCOSE mg/dL 106*  --   --   --  99  --  102*    < > = values in this interval not displayed.       Assessment/Plan   Assessment:    1. MDS. 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 IPSS-R score: 2.64, IPSS-R category: Low.  CT CAP 7/2/19 - [facility].   2. Anemia:  Macrocytosis.  B12 was low-normal and folate was normal. BM BX shows low grade MDS. Borderline CKD, with creatinine of 1.01 and GFR <60 may also be contributing. She reports vaginal bleeding/spotting.  Bleeding may also be contributing.  PLAN:  1. CBC and erythropoietin level today  2. Consider starting Procrit for worsening counts   3. RTC in 8 weeks     Electronically signed by MARYJANE Burris, 07/10/19, 2:31 PM.      Orders Placed This Encounter   Procedures   • CBC Auto Differential   • Erythropoietin     Standing Status:   Future     Number of Occurrences:   1     Standing Expiration Date:   7/10/2020   • SCANNED - LABS   • CBC & Differential     Standing Status:   Future     Number of Occurrences:   1     Order Specific Question:   Manual Differential     Answer:   No          Clinically patient was feeling well and fatigue was stable. Bone marrow biopsy results and CT scans were reviewed in detail with the patient and her daughter.  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 genomic alterations :SF3B1 p.[id number] is associated with ringed sideroblasts and a lower likelihood of transformation to acute myeloid leukemia and a more favorable prognosis. TET2 common in myelodysplastic syndromes and as needed to occur in 20 to 25% of cases.  Of note, check to mutation may also be seen in the setting of age-related clonal hematopoiesis of indeterminate potential.  Prognostic significance of type II and MDS remains under investigation.  DNMT3.is an unclear variant.  This variant has been reported 3 cases of acute mild leukemia and a case of bladder urothelial  carcinoma.  Has also been reported in one healthy individual.  Other likely disease associated, due to the paucity of clinical and functional evidence regarding this variant, its clinical significance is currently unclear.  The patient was advised that it was possible for MDS to transform into an acute myeloid leukemia. A study in Blood 2011 118:7333-1423; doi: https://doi.org/10.1182/vzmhn-7100-08-609167 (Patricia et al.) demonstrated that TET2 mutation was a poor prognostic factor in patients with intermediate-risk cytogenetics. We will check an erythropoietin level today and continue to monitor her CBC.  She will return to the office in 2 months with a CBC.     Devonte Emanuel MD  07/10/2019  11:27 AM    Patient seen and examined.  I personally reviewed all pertinent labs and the bone marrow pathology.  I agree with  nurse practitioner's assessment and plan.  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 genomic alterations .  Her clinical course seems to be very favorable, and so far she does not need any treatment.  IPSS-R category: Low.  Will need CBC monitoring to evaluate for progression to AML.  Check EPO level and evaluate the need for procrit.  Will follow in 2 months      Thank you very much for providing the opportunity to participate in this patient’s care. Please do not hesitate to call if there are any other questions    Much of the above report is an electronic transcription//translation of the spoken language to printed text using Dragon Software. As such, the subtleties and finesse of the spoken language may permit erroneous, or at times, nonsensical words or phrases to be inadvertently transcribed; thus changes may be made at a later date to rectify these errors.

## 2019-07-11 LAB — ETHNIC BACKGROUND STATED: 14.8 MIU/ML (ref 2.6–18.5)

## 2019-07-15 ENCOUNTER — TELEPHONE (OUTPATIENT)
Dept: CARDIOLOGY | Facility: CLINIC | Age: 84
End: 2019-07-15

## 2019-07-15 ENCOUNTER — TELEPHONE (OUTPATIENT)
Dept: FAMILY MEDICINE CLINIC | Facility: CLINIC | Age: 84
End: 2019-07-15

## 2019-07-15 NOTE — TELEPHONE ENCOUNTER
Patient's daughter wants to let Dr. Oliver know she has been diagnosed with a form of cancer called MDS. She doesn't think this will affect when she needs to come in to see him, though. She is on the schedule for August.

## 2019-08-14 PROBLEM — E78.5 HYPERLIPIDEMIA: Status: ACTIVE | Noted: 2018-08-10

## 2019-08-14 PROBLEM — J44.9 CHRONIC OBSTRUCTIVE PULMONARY DISEASE (HCC): Status: ACTIVE | Noted: 2018-08-10

## 2019-08-16 ENCOUNTER — OFFICE VISIT (OUTPATIENT)
Dept: CARDIOLOGY | Facility: CLINIC | Age: 84
End: 2019-08-16

## 2019-08-16 VITALS
DIASTOLIC BLOOD PRESSURE: 71 MMHG | BODY MASS INDEX: 18.61 KG/M2 | SYSTOLIC BLOOD PRESSURE: 122 MMHG | HEIGHT: 63 IN | HEART RATE: 74 BPM | WEIGHT: 105 LBS

## 2019-08-16 DIAGNOSIS — I25.719 CORONARY ARTERY DISEASE INVOLVING AUTOLOGOUS VEIN CORONARY BYPASS GRAFT WITH ANGINA PECTORIS (HCC): Primary | ICD-10-CM

## 2019-08-16 DIAGNOSIS — I10 BENIGN ESSENTIAL HYPERTENSION: ICD-10-CM

## 2019-08-16 DIAGNOSIS — E78.2 MIXED HYPERLIPIDEMIA: ICD-10-CM

## 2019-08-16 PROCEDURE — 99214 OFFICE O/P EST MOD 30 MIN: CPT | Performed by: INTERNAL MEDICINE

## 2019-08-16 PROCEDURE — 93000 ELECTROCARDIOGRAM COMPLETE: CPT | Performed by: INTERNAL MEDICINE

## 2019-08-16 NOTE — PROGRESS NOTES
Date of Office Visit: 2019  Encounter Provider: Dr. Cristopher Oliver   Place of Service: [facility] CARDIOLOGY [place]  Patient Name: Devon Crane  :1932  Devonte Emanuel MD    Chief Complaint   Patient presents with   • Hypertension  CAD  CABG  COPD  Shortness of breath  Anemia  Myelodysplastic syndrome     6 month follow up     History of Present Illness    I am pleased to see Mrs. Crane in my office today As a follow-up    As you know, patient is 87 years old white female whose past medical history is significant for advanced COPD, home oxygen, CAD, CABG, who came today  for follow-up    In , patient underwent CABG x1 with LIMA to LAD.  Patient did well.  Left ventricular function was preserved.  In May 2018, patient was admitted at [facility] and underwent stress test which was abnormal.  Subsequent cardiac catheterization showed normal coronary arteries and LIMA to LAD is patent.  Echocardiogram showed EF of 70% and tricuspid regurgitation was noted with pulmonary artery pressure was 47 mm of mercury.    Since the previous visit, patient has been diagnosed with myelodysplastic syndrome and is being considered for chemotherapy.  Patient is anemic.  From a cardiac standpoint patient is relatively stable.  She denies any chest pain or tightness or heaviness.  She does have baseline shortness of breath.  Patient is on home oxygen.  Patient denies any orthopnea, PND, syncope or presyncope.  No leg edema noted.    EKG showed sinus rhythm with isolated PVCs.  Right bundle block is noted.    From a cardiac standpoint patient is stable.  Her blood pressure and heart rate is desirable.  Patient is not reporting any angina.  At this stage she can proceed with chemotherapy if needed from a cardiac standpoint.  I will monitor blood pressure at home.  I will see the patient in 1 year    Past Medical History:   Diagnosis Date   • Anemia    • CAD (coronary artery disease)    • COPD (chronic  obstructive pulmonary disease) (CMS/HCC)    • Dyslipidemia    • GERD (gastroesophageal reflux disease)    • Hearing loss    • MDS (myelodysplastic syndrome) (CMS/HCC)    • Osteoarthritis    • Oxygen dependent    • Paroxysmal A-fib (CMS/HCC)    • Presence of pessary    • Prolapse of female bladder, acquired    • Pulmonary hypertension (CMS/HCC)    • Vaginal bleeding          Past Surgical History:   Procedure Laterality Date   • APPENDECTOMY     • CORONARY ARTERY BYPASS GRAFT  2013   • CYSTOCELE REPAIR     • EYE LENS IMPLANT SECONDARY     • HYSTERECTOMY     • KNEE CARTILAGE SURGERY Right    • SKIN CANCER EXCISION      head and face           Current Outpatient Medications:   •  aspirin 81 MG chewable tablet, Chew 81 mg Daily., Disp: , Rfl:   •  B Complex Vitamins (VITAMIN-B COMPLEX PO), Take  by mouth Daily., Disp: , Rfl:   •  Denosumab (PROLIA SC), Inject  under the skin into the appropriate area as directed Every 6 (Six) Months., Disp: , Rfl:   •  IBUPROFEN IB PO, Take  by mouth As Needed., Disp: , Rfl:   •  Multiple Vitamins-Minerals (MULTIVITAMIN ADULT PO), Take  by mouth., Disp: , Rfl:   •  omeprazole (priLOSEC) 20 MG capsule, Take 20 mg by mouth Daily., Disp: , Rfl:   •  Parenteral Electrolytes (NUTRILYTE IV), Infuse  into a venous catheter Daily., Disp: , Rfl:   •  simvastatin (ZOCOR) 40 MG tablet, Take 40 mg by mouth Daily., Disp: , Rfl: 4      Social History     Socioeconomic History   • Marital status:      Spouse name: Not on file   • Number of children: Not on file   • Years of education: Not on file   • Highest education level: Not on file   Tobacco Use   • Smoking status: Never Smoker   • Smokeless tobacco: Never Used   Substance and Sexual Activity   • Alcohol use: No     Frequency: Never   • Drug use: No   • Sexual activity: Defer         Review of Systems   Constitution: Negative for chills and fever.   HENT: Negative for ear discharge and nosebleeds.    Eyes: Negative for discharge and  "redness.   Cardiovascular: Positive for palpitations. Negative for chest pain, orthopnea, paroxysmal nocturnal dyspnea and syncope.   Respiratory: Positive for shortness of breath. Negative for cough and wheezing.    Endocrine: Negative for heat intolerance.   Skin: Negative for rash.   Musculoskeletal: Positive for arthritis and joint pain. Negative for myalgias.   Gastrointestinal: Negative for abdominal pain, melena, nausea and vomiting.   Genitourinary: Negative for dysuria and hematuria.   Neurological: Negative for dizziness, light-headedness, numbness and tremors.   Psychiatric/Behavioral: Negative for depression. The patient is not nervous/anxious.        Procedures      ECG 12 Lead  Date/Time: 8/16/2019 10:22 AM  Performed by: Cristopher Oliver MD  Authorized by: Cristopher Oliver MD   Rhythm: sinus rhythm  Conduction: right bundle branch block and left anterior fascicular block    Clinical impression: abnormal EKG            ECG 12 Lead    (Results Pending)           Objective:    /71   Pulse 74   Ht 160 cm (63\")   Wt 47.6 kg (105 lb)   BMI 18.60 kg/m²         Physical Exam   Constitutional: She is oriented to person, place, and time. She appears well-developed and well-nourished.   HENT:   Head: Normocephalic and atraumatic.   Eyes: Right eye exhibits no discharge. No scleral icterus.   Neck: No thyromegaly present.   Cardiovascular: Normal rate, regular rhythm and normal heart sounds. Exam reveals no gallop and no friction rub.   No murmur heard.  Pulmonary/Chest: Effort normal. No respiratory distress. She has wheezes. She has no rales.   Abdominal: There is no tenderness.   Musculoskeletal: She exhibits no edema.   Lymphadenopathy:     She has no cervical adenopathy.   Neurological: She is alert and oriented to person, place, and time.   Skin: No rash noted. No erythema.   Psychiatric: She has a normal mood and affect.           Assessment:       Diagnosis Plan   1. Coronary artery disease involving " autologous vein coronary bypass graft with angina pectoris (CMS/HCC)     2. Benign essential hypertension     3. Mixed hyperlipidemia              Plan:       Patient is doing well from cardiac standpoint.  Patient and continue current treatment.  I intend to see the patient in 1 year.  Patient can proceed with chemotherapy if required from cardiac standpoint

## 2019-09-10 NOTE — PROGRESS NOTES
Hematology-Oncology Follow-up Note       Devon Crane  8/2/1932    Primary Care Physician: Devonte Emanuel MD  Referring Physician: Devonte Emanuel MD  Reason For Visit:    Chief Complaint   Patient presents with   • Follow-up     macrocytic anemia       HPI  Ms. Crane is an 86-year-old female who presents to our office on   6/4/19 for evaluation of anemia.  She had a CBC on 5/29/19 at her PCP, Dr. Mcdaniel’s office that   showed a hemoglobin of 9.7.  B12 and folate levels were checked and they came back normal.    Creatinine was 1.01 with gfr 50.  LFTs were normal.  Her hemoglobin on 5/25/19, during a recent   hospitalization, was 9.1.  TSH was also normal on 5/27/18.  B12 was 328 (low-normal) on 5/26/18.    Patient was referred to us for further evaluation.    · 5/25/18 – WBC 5.7, hemoglobin 9.1, platelet count 184,000, .4.  Creatinine 0.9, BUN 10,   albumin 3.2, globulin 2.7, total protein 5.8.    · 5/26/18 – B12 320.  Folate 9.5.    · 5/27/19 – TSH 1.85.    · 5/29/19 – WBC 7.7, hemoglobin 9.7, platelet count 264,000, MCV 98.3.  Creatinine 1.01, BUN 15,   albumin 3.9, globulin 3.7, bilirubin 0.5, AST 15, ALT 9, alkaline phosphatase 46.  Absolute retic   count 43,950.  Retic count percent is 1.5%.  BNP 63.  · 5/29/19 – Chest x-ray – Emphysema.       6/4/2019 – The patient presents for initial consultation.  She reports fatigue.  Reports weight loss.  Decreased appetite She denies any   bleeding per rectum.  Stool Hemoccult test was checked at Dr. Mcdaniel’s office this past week and   it was negative x3.  She reports vaginal bleeding, about twice a week, during the past few   months.  Patient says it is minimal.  She had a hysterectomy in 1963.    6/4/2019 , reticulocyte count 1.7%, ferritin 180, iron saturation 9% low, TIBC 223 low, serum iron 20 low, ferritin 180  Haptoglobin 208,Serum copper 104 normal    6/19/2019  7/10/2019 WBC 7.4, hemoglobin 10, platelets 264,  erythropoietin level  14.8  9/11/2019 WBC 9.2, hemoglobin 9.4, platelet count 256, MCV 99.7      9/11/2019  Subjective 9/11/19  Patient here for follow up, accompanied by family.  Family reports that her weight loss has stopped and her appetite is a little better.  She is also very tired.       Past Medical History:   Diagnosis Date   • Anemia    • CAD (coronary artery disease)    • COPD (chronic obstructive pulmonary disease) (CMS/HCC)    • Dyslipidemia    • GERD (gastroesophageal reflux disease)    • Hearing loss    • MDS (myelodysplastic syndrome) (CMS/HCC)    • Osteoarthritis    • Oxygen dependent    • Paroxysmal A-fib (CMS/HCC)    • Presence of pessary    • Prolapse of female bladder, acquired    • Pulmonary hypertension (CMS/HCC)    • Vaginal bleeding        Past Surgical History:   Procedure Laterality Date   • APPENDECTOMY     • CORONARY ARTERY BYPASS GRAFT  2013   • CYSTOCELE REPAIR     • EYE LENS IMPLANT SECONDARY     • HYSTERECTOMY     • KNEE CARTILAGE SURGERY Right    • SKIN CANCER EXCISION      head and face         Current Outpatient Medications:   •  aspirin 81 MG chewable tablet, Chew 81 mg Daily., Disp: , Rfl:   •  B Complex Vitamins (VITAMIN-B COMPLEX PO), Take  by mouth Daily., Disp: , Rfl:   •  Denosumab (PROLIA SC), Inject  under the skin into the appropriate area as directed Every 6 (Six) Months., Disp: , Rfl:   •  Esomeprazole Magnesium (NEXIUM PO), Take  by mouth., Disp: , Rfl:   •  Multiple Vitamins-Minerals (MULTIVITAMIN ADULT PO), Take  by mouth., Disp: , Rfl:   •  Parenteral Electrolytes (NUTRILYTE IV), Infuse  into a venous catheter Daily., Disp: , Rfl:   •  simvastatin (ZOCOR) 40 MG tablet, Take 40 mg by mouth Daily., Disp: , Rfl: 4    Allergies   Allergen Reactions   • Diphenhydramine Swelling     Unknown.        Family History   Problem Relation Age of Onset   • Cancer Sister    • Cancer Brother    • Heart disease Mother    • Heart disease Father        Cancer-related family history includes Cancer in her  "brother and sister.    Social History     Tobacco Use   • Smoking status: Never Smoker   • Smokeless tobacco: Never Used   Substance Use Topics   • Alcohol use: No     Frequency: Never   • Drug use: No           Review of Systems   Constitutional: Positive for fatigue and unexpected weight change. Negative for activity change, chills and fever.   HENT: Negative for ear discharge, mouth sores, nosebleeds and sore throat.    Eyes: Negative for photophobia and visual disturbance.   Respiratory: Positive for shortness of breath (reports shortness of breath; wears 2L oxygen by nasal cannula, order Dr. Mcdaniel). Negative for cough, choking and wheezing.    Cardiovascular: Negative for chest pain and palpitations.   Gastrointestinal: Positive for abdominal pain. Negative for diarrhea, nausea and vomiting.        Poor appetite,   Genitourinary: Positive for vaginal bleeding. Negative for dysuria.   Musculoskeletal: Negative for neck stiffness.   Skin: Negative for rash.   Neurological: Negative for seizures, syncope and speech difficulty.   Psychiatric/Behavioral: Negative for agitation, confusion and hallucinations.       Objective:    Vitals:    09/11/19 1041   BP: 110/71   Pulse: 91   Resp: 20   Temp: 97.8 °F (36.6 °C)   Weight: 48.8 kg (107 lb 9.6 oz)   Height: 160 cm (63\")   PainSc: 0-No pain       (0) Fully active, able to carry on all predisease performance without restriction    Physical Exam   Constitutional: She is oriented to person, place, and time. She appears well-developed and well-nourished.   Appears frail.,  Elderly female with unsteady gait   HENT:   Head: Normocephalic and atraumatic.   Nose: Nose normal.   Mouth/Throat: Oropharynx is clear and moist. No oropharyngeal exudate.   Eyes: Conjunctivae and EOM are normal. No scleral icterus.   Neck: Normal range of motion. No tracheal deviation present. No thyromegaly present.   Cardiovascular: Regular rhythm and normal heart sounds.   Pulmonary/Chest: Effort " normal and breath sounds normal. No stridor.   Abdominal: Soft. Bowel sounds are normal. She exhibits no distension and no mass. There is no tenderness.   Musculoskeletal: Normal range of motion. She exhibits no edema or deformity.   Lymphadenopathy:     She has no cervical adenopathy.   Neurological: She is alert and oriented to person, place, and time. No sensory deficit.   Skin: Skin is warm.   Psychiatric: Her behavior is normal.   Vitals reviewed.        Results from last 7 days   Lab Units 09/11/19  1045   WBC 10*3/mm3 9.28   HEMOGLOBIN g/dL 9.4*   HEMATOCRIT % 28.8*   PLATELETS 10*3/mm3 256   MCV fL 99.7*           Invalid input(s): LABALBU, PROT    Assessment     1.  Macrocytic anemia: Hemoglobin was 9.7 on 5/29/19.  She has slight macrocytosis.  B12 was low-normal and folate was normal.  She could have borderline CKD, with creatinine of 1.01 and GFR <60.    Iron panel was more consistent with anemia of chronic disease (ferritin was normal but iron sat TIBC was low).  Copper level was normal.   She reports vaginal bleeding/spotting. Bleeding may also be contributing. Also   anemia of mild CKD is possible.  Will rule out bone marrow disorder.  Anemia due to malignancy is also possible.    2. Vaginal bleeding:  Will need further evaluation.    3.  Weight loss:-Patient has experienced significant weight loss over the past 6 to 12 months.  She also has been losing appetite, also getting weaker.  All the symptoms raise a concern for malignancy.     PLAN:  1.  We will perform bone marrow aspiration biopsy today.  2.  Recommend CT chest abdomen and pelvis to rule out malignancy..   3.. She will need a gynecological exam.  Will have this arranged through Dr. Mcdaniel’s office,  We will follow patient in about 2 weeks.            Procedures      Orders Placed This Encounter   Procedures   • CBC Auto Differential     collect LABS TODAY     Scheduling Instructions:      collect LABS TODAY                Thank you very  much for providing the opportunity to participate in this patient’s care. Please do not hesitate to call if there are any other questions

## 2019-09-11 ENCOUNTER — APPOINTMENT (OUTPATIENT)
Dept: LAB | Facility: HOSPITAL | Age: 84
End: 2019-09-11

## 2019-09-11 ENCOUNTER — OFFICE VISIT (OUTPATIENT)
Dept: ONCOLOGY | Facility: CLINIC | Age: 84
End: 2019-09-11

## 2019-09-11 VITALS
BODY MASS INDEX: 19.07 KG/M2 | WEIGHT: 107.6 LBS | SYSTOLIC BLOOD PRESSURE: 110 MMHG | RESPIRATION RATE: 20 BRPM | DIASTOLIC BLOOD PRESSURE: 71 MMHG | HEART RATE: 91 BPM | TEMPERATURE: 97.8 F | HEIGHT: 63 IN

## 2019-09-11 DIAGNOSIS — D64.9 ANEMIA, UNSPECIFIED TYPE: Primary | ICD-10-CM

## 2019-09-11 DIAGNOSIS — D46.9 MDS (MYELODYSPLASTIC SYNDROME) (HCC): ICD-10-CM

## 2019-09-11 LAB
BASOPHILS # BLD AUTO: 0.09 10*3/MM3 (ref 0–0.2)
BASOPHILS NFR BLD AUTO: 1 % (ref 0–1.5)
DEPRECATED RDW RBC AUTO: 45.7 FL (ref 37–54)
EOSINOPHIL # BLD AUTO: 0.06 10*3/MM3 (ref 0–0.4)
EOSINOPHIL NFR BLD AUTO: 0.6 % (ref 0.3–6.2)
ERYTHROCYTE [DISTWIDTH] IN BLOOD BY AUTOMATED COUNT: 13.4 % (ref 12.3–15.4)
HCT VFR BLD AUTO: 28.8 % (ref 34–46.6)
HGB BLD-MCNC: 9.4 G/DL (ref 12–15.9)
LYMPHOCYTES # BLD AUTO: 2.48 10*3/MM3 (ref 0.7–3.1)
LYMPHOCYTES NFR BLD AUTO: 26.7 % (ref 19.6–45.3)
MCH RBC QN AUTO: 32.5 PG (ref 26.6–33)
MCHC RBC AUTO-ENTMCNC: 32.6 G/DL (ref 31.5–35.7)
MCV RBC AUTO: 99.7 FL (ref 79–97)
MONOCYTES # BLD AUTO: 0.88 10*3/MM3 (ref 0.1–0.9)
MONOCYTES NFR BLD AUTO: 9.5 % (ref 5–12)
NEUTROPHILS # BLD AUTO: 5.77 10*3/MM3 (ref 1.7–7)
NEUTROPHILS NFR BLD AUTO: 62.2 % (ref 42.7–76)
PLATELET # BLD AUTO: 256 10*3/MM3 (ref 140–450)
PMV BLD AUTO: 8.9 FL (ref 6–12)
RBC # BLD AUTO: 2.89 10*6/MM3 (ref 3.77–5.28)
WBC NRBC COR # BLD: 9.28 10*3/MM3 (ref 3.4–10.8)

## 2019-09-11 PROCEDURE — 85025 COMPLETE CBC W/AUTO DIFF WBC: CPT | Performed by: INTERNAL MEDICINE

## 2019-09-11 PROCEDURE — 99214 OFFICE O/P EST MOD 30 MIN: CPT | Performed by: INTERNAL MEDICINE

## 2019-09-11 RX ORDER — ESOMEPRAZOLE MAGNESIUM 20 MG/1
1 FOR SUSPENSION ORAL DAILY
COMMUNITY
End: 2022-01-01

## 2019-09-11 NOTE — PROGRESS NOTES
Hematology-Oncology Follow-up Note     Name: Devon Crane   : 1932   MRN;[id number]    Primary Care Physician: Devonte Emanuel MD  Referring Physician: Devonte Emanuel MD    Reason For Visit:  Chief Complaint   Patient presents with   • Follow-up     macrocytic anemia   Myelodysplastic syndrome      HPI:   Ms. Crane is an 87 y.o. female who presents to our office on 19 for evaluation of anemia.  She had a CBC on 19 at her PCP, Dr. Mcdaniel’s office that showed a hemoglobin of 9.7.  B12 and folate levels were checked and they came back normal.  Creatinine was 1.01 with gfr 50.  LFTs were normal.  Her hemoglobin on 19, during a recent hospitalization, was 9.1.  TSH was also normal on 18.  B12 was 328 (low-normal) on 18.  Patient was referred to us for further evaluation.    • 18 - WBC 5.7, hemoglobin 9.1, platelet count 184,000, .4.  Creatinine 0.9, BUN 10, albumin 3.2, globulin 2.7, total protein 5.8.    • 18 - B12 320.  Folate 9.5.    • 19 - TSH 1.85.    • 19 - WBC 7.7, hemoglobin 9.7, platelet count 264,000, MCV 98.3.  Creatinine 1.01, BUN 15, albumin 3.9, globulin 3.7, bilirubin 0.5, AST 15, ALT 9, alkaline phosphatase 46.  Absolute retic count 43,950.  Retic count percent is 1.5%.  BNP 63.  • 19 - Chest x-ray - Emphysema.     • 2019 - The patient presents for initial consultation.  She reports fatigue.  She denies any bleeding per rectum.  Stool Hemoccult test was checked at Dr. Mcdaniel’s office this past week and it was negative x3.  She reports vaginal bleeding, about twice a week, during the past few months.  Patient says it is minimal.  She had a hysterectomy in .   •   • 2019 reticulocyte count 1.7%, , ferritin 180, haptoglobin 208, copper level 104, haptoglobin 208, and saturation 9%, serum iron 20 low, TIBC 223 low    • 19 -- Bone marrow biopsy -hypercellular marrow with maturing trilineage hematopoiesis,  dyserythropoiesis including ringed  fibroblast and mild megakaryocytic atypia.  Marrow cellularity: 60 to 70%; blasts equals 1%; iron storage: present with ring sideroblasts (less than 15%); marrow fibrosis absent; Cytogenetics: Normal female karyotype; FISH: Normal MDS panel; next generation sequencing: Detected genomic alterations-SF3B1 p.[id number], TET2 p.?, one unclear variant in DNMT3A; No evidence of acute leukemia, metastatic carcinoma, plasma cell neoplasm, or lymphoma.   the morphologic findings, in combination with peripheral blood CBC data are suggestive of myelodysplastic syndrome, most likely MDS with single lineage dysplasia.  Ring sideroblasts are present in less than 15% of erythroid precursors, which does not meet the criteria for MDS-ROS without the status is of SF 3B1 mutation.  IPSS-R score: 2.64, IPSS-R category: Low. Blood: hemoglobin 9.5, platelet count 237,,000, ANC 5.4.   • 7/10/2019 erythropoietin 14.8 normal  • 9/11/2019  WBC 9.2, Hgb 9.4, Platelets 256K,    Subjective:   The patient is here for a scheduled follow up.   Patient has some mild fatigue and chronic stable shortness of air. She remained oxygen dependent, on 2 l/m per nc.  Does not report any bleeding per rectum.  Hemoglobin has remained stable.    The following portions of the patient's history were reviewed and updated as appropriate: allergies, current medications, past family history, past medical history, past social history, past surgical history and problem list.     Past Medical History:   Diagnosis Date   • Anemia    • CAD (coronary artery disease)    • COPD (chronic obstructive pulmonary disease) (CMS/HCC)    • Dyslipidemia    • GERD (gastroesophageal reflux disease)    • Hearing loss    • MDS (myelodysplastic syndrome) (CMS/HCC)    • Osteoarthritis    • Oxygen dependent    • Paroxysmal A-fib (CMS/HCC)    • Presence of pessary    • Prolapse of female bladder, acquired    • Pulmonary hypertension (CMS/HCC)    • Vaginal  bleeding        Past Surgical History:   Procedure Laterality Date   • APPENDECTOMY     • CORONARY ARTERY BYPASS GRAFT  2013   • CYSTOCELE REPAIR     • EYE LENS IMPLANT SECONDARY     • HYSTERECTOMY     • KNEE CARTILAGE SURGERY Right    • SKIN CANCER EXCISION      head and face         Current Outpatient Medications:   •  aspirin 81 MG chewable tablet, Chew 81 mg Daily., Disp: , Rfl:   •  B Complex Vitamins (VITAMIN-B COMPLEX PO), Take  by mouth Daily., Disp: , Rfl:   •  Denosumab (PROLIA SC), Inject  under the skin into the appropriate area as directed Every 6 (Six) Months., Disp: , Rfl:   •  Esomeprazole Magnesium (NEXIUM PO), Take  by mouth., Disp: , Rfl:   •  Multiple Vitamins-Minerals (MULTIVITAMIN ADULT PO), Take  by mouth., Disp: , Rfl:   •  Parenteral Electrolytes (NUTRILYTE IV), Infuse  into a venous catheter Daily., Disp: , Rfl:   •  simvastatin (ZOCOR) 40 MG tablet, Take 40 mg by mouth Daily., Disp: , Rfl: 4    Allergies   Allergen Reactions   • Diphenhydramine Swelling     Unknown.        Family History   Problem Relation Age of Onset   • Cancer Sister    • Cancer Brother    • Heart disease Mother    • Heart disease Father        Cancer-related family history includes Cancer in her brother and sister.    Social History     Tobacco Use   • Smoking status: Never Smoker   • Smokeless tobacco: Never Used   Substance Use Topics   • Alcohol use: No     Frequency: Never   • Drug use: No         ROS:   Review of Systems   Constitutional: Positive for fatigue. Negative for chills and fever.   HENT: Negative for ear pain, mouth sores, nosebleeds and sore throat.    Eyes: Negative for photophobia and visual disturbance.   Respiratory: Positive for shortness of breath ( ). Negative for wheezing and stridor.    Cardiovascular: Negative for chest pain and palpitations.   Gastrointestinal: Negative for abdominal pain, diarrhea, nausea and vomiting.   Endocrine: Negative for cold intolerance and heat intolerance.  "  Genitourinary: Negative for dysuria and hematuria.   Musculoskeletal: Negative for joint swelling and neck stiffness.   Skin: Negative for color change and rash.   Neurological: Negative for seizures and syncope.   Hematological: Negative for adenopathy.        No obvious bleeding   Psychiatric/Behavioral: Negative for agitation, confusion and hallucinations.   All other systems reviewed and are negative.      Objective:  Vitals:  Vitals:    09/11/19 1041   BP: 110/71   Pulse: 91   Resp: 20   Temp: 97.8 °F (36.6 °C)   Weight: 48.8 kg (107 lb 9.6 oz)   Height: 160 cm (63\")   PainSc: 0-No pain     Body mass index is 19.06 kg/m².      Physical Exam:   Physical Exam   Constitutional: She is oriented to person, place, and time. She appears well-developed and well-nourished. No distress.   HENT:   Head: Normocephalic and atraumatic.   Eyes: Conjunctivae and EOM are normal. Right eye exhibits no discharge. Left eye exhibits no discharge. No scleral icterus.   Neck: Normal range of motion. Neck supple. No thyromegaly present.   Cardiovascular: Normal rate, regular rhythm, normal heart sounds and intact distal pulses. Exam reveals no gallop and no friction rub.   Pulmonary/Chest: Effort normal. No stridor. No respiratory distress. She has no wheezes.   On 2 liters oxygen via nasal cannula    Abdominal: Soft. Bowel sounds are normal. She exhibits no mass. There is no tenderness. There is no rebound and no guarding.   Musculoskeletal: Normal range of motion. She exhibits no tenderness.   Lymphadenopathy:     She has no cervical adenopathy.   Neurological: She is alert and oriented to person, place, and time. She exhibits normal muscle tone.   Skin: Skin is warm. No rash noted. She is not diaphoretic. No erythema.   Psychiatric: She has a normal mood and affect. Her behavior is normal.   Nursing note and vitals reviewed.        Lab Results - Last 18 Months   Lab Units 09/11/19  1045 07/10/19  1259 06/19/19  1342   WBC " 10*3/mm3 9.28 7.42 7.98   HEMOGLOBIN g/dL 9.4* 10.0* 9.5*   HEMATOCRIT % 28.8* 30.5* 29.0*   PLATELETS 10*3/mm3 256 264 237   MCV fL 99.7* 102.7* 102.5*     Lab Results - Last 18 Months   Lab Units 07/10/19  1259 07/02/19  1007 05/29/19  0000 11/12/18  0000  05/27/18  0432  05/25/18  2354   SODIUM mmol/L 135*  --  134* 137   < > 137  --  140   POTASSIUM mmol/L 5.0  --  4.7 4.2   < > 4.9   < > 3.7   CHLORIDE mmol/L 104  --  102 101   < > 108  --  111   TOTAL CO2 mmol/L  --   --  22 27   < > 25  --  24   CO2 mmol/L 23.0  --   --   --   --   --   --   --    BUN mg/dL 9  --  15 13   < > 11  --  10   CREATININE mg/dL 0.80 0.80 1.01* 1.0   < > 0.8  --  0.9   CALCIUM mg/dL 8.7*  --  8.8 9.7   < > 8.2*  --  8.0*   BILIRUBIN mg/dL 0.6  --  0.5 0.7   < >  --   --  0.8   ALK PHOS U/L 42  --  46 46   < >  --   --  42   ALT (SGPT) U/L 10*  --  9  --   --   --   --  12*   AST (SGOT) U/L 17  --  15 18   < >  --   --  22   GLUCOSE mg/dL 106*  --   --   --   --  99  --  102*    < > = values in this interval not displayed.       Lab Results   Component Value Date    GLUCOSE 106 (H) 07/10/2019    BUN 9 07/10/2019    CREATININE 0.80 07/10/2019    EGFRIFNONA 68 07/10/2019    EGFRIFAFRI 58 (L) 05/29/2019    BCR 11.3 07/10/2019    K 5.0 07/10/2019    CO2 23.0 07/10/2019    CALCIUM 8.7 (L) 07/10/2019    ALBUMIN 3.50 07/10/2019    LABIL2 1.1 05/25/2018    AST 17 07/10/2019    ALT 10 (L) 07/10/2019     Lab Results   Component Value Date    IRON 42 (L) 05/22/2019     Lab Results   Component Value Date    FOLATE 7.6 01/21/2019     Lab Results   Component Value Date    OCCULTBLD Negative 05/29/2019    OCCULTBLD Negative 11/19/2018     No results found for: RETICCTPCT  Lab Results   Component Value Date    MFEQMUQZ47 521 01/21/2019     No results found for: SPEP, UPEP  No results found for: LDH, URICACID  No results found for: ESTER, RF, SEDRATE  No results found for: FIBRINOGEN, HAPTOGLOBIN  Lab Results   Component Value Date    PTT 25.3  05/27/2018    INR 1.0 05/27/2018     No results found for:   No results found for: CEA  No components found for: CA-19-9  No results found for: PSA  Assessment/Plan   Assessment:    1. MDS.  Patient presented with chronic macrocytic anemia.  Bone marrow biopsy performed on 6/19/19 suggestive of myelodysplastic syndrome,with single lineage dysplasia with genomic alterations :SF3B1 p.[id number], TET2 p., one unclear variant in DNMT3A.. IPSS-R score: 2.64, IPSS-R category: Low. .  Her clinical course seems to be very favorable, and so far she does not need any treatment.  Will need CBC monitoring to evaluate for progression to AML.    2. Anemia:  Macrocytosis.  B12 was low-normal and folate was normal. BM BX shows low grade MDS. Borderline CKD, with creatinine of 1.01 and GFR <60 may also be contributing. She reports vaginal bleeding/spotting.  Bleeding may also be contributing.  Her iron levels are low normal.    PLAN:  1. CBC shows the hemoglobin remained stable.  Patient does not have any symptoms from her anemia therefore there is no need to start her on Procrit.  2. Platelets and WBC are also stable.  Therefore no plans to start any treatment .  3. Iron levels are low normal, will start her on 1 tablet of iron and see if it may benefit.  We will recheck iron levels in 2 months.  4. RTC in 2 months          Orders Placed This Encounter   Procedures   • CBC Auto Differential     collect LABS TODAY     Scheduling Instructions:      collect LABS TODAY   • CBC Auto Differential     AT THE NEXT VISIT (ON ARRIVAL)     Standing Status:   Future     Scheduling Instructions:      AT THE NEXT VISIT (ON ARRIVAL)          Clinically patient was feeling well and fatigue was stable. Bone marrow biopsy results and CT scans were reviewed in detail with the patient and her daughter.  She was advised her bone marrow biopsy results  in combination with peripheral blood CBC data are suggestive of myelodysplastic syndrome, most  likely MDS with single lineage dysplasia with genomic alterations :SF3B1 p.[id number], TET2 p.?, one unclear variant in DNMT3. genomic alterations :SF3B1 p.[id number] is associated with ringed sideroblasts and a lower likelihood of transformation to acute myeloid leukemia and a more favorable prognosis. TET2 common in myelodysplastic syndromes and as needed to occur in 20 to 25% of cases.  Of note, check to mutation may also be seen in the setting of age-related clonal hematopoiesis of indeterminate potential.  Prognostic significance of type II and MDS remains under investigation.  DNMT3.is an unclear variant.  This variant has been reported 3 cases of acute mild leukemia and a case of bladder urothelial carcinoma.  Has also been reported in one healthy individual.  Other likely disease associated, due to the paucity of clinical and functional evidence regarding this variant, its clinical significance is currently unclear.  The patient was advised that it was possible for MDS to transform into an acute myeloid leukemia. A study in Blood 2011 118:6087-2935; doi: https://doi.org/10.1182/ydsla-5865-07-415022 (Patricia et al.) demonstrated that TET2 mutation was a poor prognostic factor in patients with intermediate-risk cytogenetics.       Thank you very much for providing the opportunity to participate in this patient’s care. Please do not hesitate to call if there are any other questions    Much of the above report is an electronic transcription//translation of the spoken language to printed text using Dragon Software. As such, the subtleties and finesse of the spoken language may permit erroneous, or at times, nonsensical words or phrases to be inadvertently transcribed; thus changes may be made at a later date to rectify these errors.

## 2019-10-31 ENCOUNTER — TELEPHONE (OUTPATIENT)
Dept: FAMILY MEDICINE CLINIC | Facility: CLINIC | Age: 84
End: 2019-10-31

## 2019-11-25 ENCOUNTER — CLINICAL SUPPORT (OUTPATIENT)
Dept: FAMILY MEDICINE CLINIC | Facility: CLINIC | Age: 84
End: 2019-11-25

## 2019-11-25 DIAGNOSIS — M81.0 OSTEOPOROSIS, POST-MENOPAUSAL: Primary | ICD-10-CM

## 2019-11-25 PROCEDURE — 96372 THER/PROPH/DIAG INJ SC/IM: CPT | Performed by: FAMILY MEDICINE

## 2019-11-27 ENCOUNTER — TELEPHONE (OUTPATIENT)
Dept: FAMILY MEDICINE CLINIC | Facility: CLINIC | Age: 84
End: 2019-11-27

## 2019-11-27 DIAGNOSIS — I25.719 CORONARY ARTERY DISEASE INVOLVING AUTOLOGOUS VEIN CORONARY BYPASS GRAFT WITH ANGINA PECTORIS (HCC): Primary | ICD-10-CM

## 2019-11-27 RX ORDER — NITROGLYCERIN 0.4 MG/1
0.4 TABLET SUBLINGUAL
Qty: 30 TABLET | Refills: 12 | Status: SHIPPED | OUTPATIENT
Start: 2019-11-27 | End: 2022-01-01

## 2019-11-27 NOTE — TELEPHONE ENCOUNTER
[facility] called and Devon needs a rx for the Nitrostat .4mg sent to [facility] in [place] and contact her a [phone], when completed.  Thanks Cassidy

## 2019-12-09 NOTE — PROGRESS NOTES
Hematology-Oncology Follow-up Note     Name: Devon Crane   : 1932   MRN;[id number]    Primary Care Physician: Devonte Emanuel MD  Referring Physician: Devonte Emanuel MD    Reason For Visit:  Chief Complaint   Patient presents with   • Follow-up     anemia   Myelodysplastic syndrome      HPI:   Ms. Crane is an 87 y.o. female who presents to our office on 19 for evaluation of anemia.  She had a CBC on 19 at her PCP, Dr. Mcdaniel’s office that showed a hemoglobin of 9.7.  B12 and folate levels were checked and they came back normal.  Creatinine was 1.01 with gfr 50.  LFTs were normal.  Her hemoglobin on 19, during a recent hospitalization, was 9.1.  TSH was also normal on 18.  B12 was 328 (low-normal) on 18.  Patient was referred to us for further evaluation.    • 18 - WBC 5.7, hemoglobin 9.1, platelet count 184,000, .4.  Creatinine 0.9, BUN 10, albumin 3.2, globulin 2.7, total protein 5.8.    • 18 - B12 320.  Folate 9.5.    • 19 - TSH 1.85.    • 19 - WBC 7.7, hemoglobin 9.7, platelet count 264,000, MCV 98.3.  Creatinine 1.01, BUN 15, albumin 3.9, globulin 3.7, bilirubin 0.5, AST 15, ALT 9, alkaline phosphatase 46.  Absolute retic count 43,950.  Retic count percent is 1.5%.  BNP 63.  • 19 - Chest x-ray - Emphysema.     • 2019 - The patient presents for initial consultation.  She reports fatigue.  She denies any bleeding per rectum.  Stool Hemoccult test was checked at Dr. Mcdaniel’s office this past week and it was negative x3.  She reports vaginal bleeding, about twice a week, during the past few months.  Patient says it is minimal.  She had a hysterectomy in .   •   • 2019 reticulocyte count 1.7%, , ferritin 180, haptoglobin 208, copper level 104, haptoglobin 208, and saturation 9%, serum iron 20 low, TIBC 223 low    • 19 -- Bone marrow biopsy -hypercellular marrow with maturing trilineage hematopoiesis, dyserythropoiesis  including ringed  fibroblast and mild megakaryocytic atypia.  Marrow cellularity: 60 to 70%; blasts equals 1%; iron storage: present with ring sideroblasts (less than 15%); marrow fibrosis absent; Cytogenetics: Normal female karyotype; FISH: Normal MDS panel; next generation sequencing: Detected genomic alterations-SF3B1 p.[id number], TET2 p.?, one unclear variant in DNMT3A; No evidence of acute leukemia, metastatic carcinoma, plasma cell neoplasm, or lymphoma.   the morphologic findings, in combination with peripheral blood CBC data are suggestive of myelodysplastic syndrome, most likely MDS with single lineage dysplasia.  Ring sideroblasts are present in less than 15% of erythroid precursors, which does not meet the criteria for MDS-ROS without the status is of SF 3B1 mutation.  IPSS-R score: 2.64, IPSS-R category: Low. Blood: hemoglobin 9.5, platelet count 237,,000, ANC 5.4.   • 7/2/2019 CT chest abdomen pelvis: No acute abdominal or pelvic abnormality.  Extensive lumbar degenerative disc changes.  Multilevel spinal canal and neural foraminal narrowing.  No evidence of active cardiopulmonary disease.  • 7/10/2019 erythropoietin 14.8 normal  • 9/11/2019  WBC 9.2, Hgb 9.4, Platelets 256K,  • 12/11/2019 WBC 6.3, hemoglobin 10.1, platelets 221, .6    Subjective: 12/10/19  The patient is here for a scheduled follow up of anemia.   She has COPD and is on O2 at 2l/m per nc. She has not started on po iron.  She continues to report symptoms of fatigue. Not taking any iron.  Does not have any new problems.  Does not report any bleeding issues.          The following portions of the patient's history were reviewed and updated as appropriate: allergies, current medications, past family history, past medical history, past social history, past surgical history and problem list.     Past Medical History:   Diagnosis Date   • Anemia    • CAD (coronary artery disease)    • COPD (chronic obstructive pulmonary disease)  (CMS/HCC)    • Dyslipidemia    • GERD (gastroesophageal reflux disease)    • Hearing loss    • MDS (myelodysplastic syndrome) (CMS/HCC)    • Osteoarthritis    • Oxygen dependent    • Paroxysmal A-fib (CMS/HCC)    • Presence of pessary    • Prolapse of female bladder, acquired    • Pulmonary hypertension (CMS/HCC)    • Vaginal bleeding        Past Surgical History:   Procedure Laterality Date   • APPENDECTOMY     • CORONARY ARTERY BYPASS GRAFT  2013   • CYSTOCELE REPAIR     • EYE LENS IMPLANT SECONDARY     • HYSTERECTOMY     • KNEE CARTILAGE SURGERY Right    • SKIN CANCER EXCISION      head and face         Current Outpatient Medications:   •  aspirin 81 MG chewable tablet, Chew 81 mg Daily., Disp: , Rfl:   •  B Complex Vitamins (VITAMIN-B COMPLEX PO), Take  by mouth Daily., Disp: , Rfl:   •  Denosumab (PROLIA SC), Inject  under the skin into the appropriate area as directed Every 6 (Six) Months., Disp: , Rfl:   •  Esomeprazole Magnesium (NEXIUM PO), Take  by mouth., Disp: , Rfl:   •  Multiple Vitamins-Minerals (MULTIVITAMIN ADULT PO), Take  by mouth., Disp: , Rfl:   •  nitroglycerin (NITROSTAT) 0.4 MG SL tablet, Place 1 tablet under the tongue Every 5 (Five) Minutes As Needed for Chest Pain. Take no more than 3 doses in 15 minutes., Disp: 30 tablet, Rfl: 12  •  Parenteral Electrolytes (NUTRILYTE IV), Take  by mouth Daily., Disp: , Rfl:   •  simvastatin (ZOCOR) 40 MG tablet, Take 40 mg by mouth Daily., Disp: , Rfl: 4  •  ferrous gluconate 324 (37.5 Fe) MG tablet tablet, Take 1 tablet by mouth Daily With Breakfast., Disp: 30 tablet, Rfl: 2    Allergies   Allergen Reactions   • Diphenhydramine Swelling     Unknown.        Family History   Problem Relation Age of Onset   • Cancer Sister    • Cancer Brother    • Heart disease Mother    • Heart disease Father        Cancer-related family history includes Cancer in her brother and sister.    Social History     Tobacco Use   • Smoking status: Never Smoker   • Smokeless  "tobacco: Never Used   Substance Use Topics   • Alcohol use: No     Frequency: Never   • Drug use: No         ROS:   Review of Systems   Constitutional: Positive for fatigue. Negative for chills and fever.   HENT: Negative for ear pain, mouth sores, nosebleeds and sore throat.    Eyes: Negative for photophobia and visual disturbance.   Respiratory: Positive for shortness of breath ( ). Negative for wheezing and stridor.    Cardiovascular: Negative for chest pain and palpitations.   Gastrointestinal: Negative for abdominal pain, diarrhea, nausea and vomiting.   Endocrine: Negative for cold intolerance and heat intolerance.   Genitourinary: Negative for dysuria and hematuria.   Musculoskeletal: Negative for joint swelling and neck stiffness.   Skin: Negative for color change and rash.   Neurological: Negative for seizures and syncope.   Hematological: Negative for adenopathy.        No obvious bleeding   Psychiatric/Behavioral: Negative for agitation, confusion and hallucinations.   All other systems reviewed and are negative.      Objective:  Vitals:  Vitals:    12/11/19 0919   BP: 111/62   Pulse: 69   Resp: 20   Temp: 97.5 °F (36.4 °C)   Weight: 49.4 kg (108 lb 12.8 oz)   Height: 160 cm (63\")   PainSc: 0-No pain     Body mass index is 19.27 kg/m².      Physical Exam:   Physical Exam   Constitutional: She is oriented to person, place, and time. She appears well-developed and well-nourished. No distress.   HENT:   Head: Normocephalic and atraumatic.   Eyes: Conjunctivae and EOM are normal. Right eye exhibits no discharge. Left eye exhibits no discharge. No scleral icterus.   Neck: Normal range of motion. Neck supple. No thyromegaly present.   Cardiovascular: Normal rate, regular rhythm, normal heart sounds and intact distal pulses. Exam reveals no gallop and no friction rub.   Pulmonary/Chest: No stridor. No respiratory distress. She has no wheezes.   On 2 liters oxygen via nasal cannula    Abdominal: Soft. Bowel " sounds are normal. She exhibits no mass. There is no tenderness. There is no rebound and no guarding.   Musculoskeletal: Normal range of motion. She exhibits no tenderness.   Lymphadenopathy:     She has no cervical adenopathy.   Neurological: She is alert and oriented to person, place, and time. She exhibits normal muscle tone.   Skin: Skin is warm. No rash noted. She is not diaphoretic. No erythema.   Psychiatric: She has a normal mood and affect. Her behavior is normal.   Nursing note and vitals reviewed.        Lab Results - Last 18 Months   Lab Units 12/11/19  0929 09/11/19  1045 07/10/19  1259   WBC 10*3/mm3 6.31 9.28 7.42   HEMOGLOBIN g/dL 10.1* 9.4* 10.0*   HEMATOCRIT % 31.1* 28.8* 30.5*   PLATELETS 10*3/mm3 221 256 264   MCV fL 102.6* 99.7* 102.7*     Lab Results - Last 18 Months   Lab Units 07/10/19  1259 07/02/19  1007 05/29/19  0000 11/12/18  0000   SODIUM mmol/L 135*  --  134* 137   POTASSIUM mmol/L 5.0  --  4.7 4.2   CHLORIDE mmol/L 104  --  102 101   TOTAL CO2 mmol/L  --   --  22 27   CO2 mmol/L 23.0  --   --   --    BUN mg/dL 9  --  15 13   CREATININE mg/dL 0.80 0.80 1.01* 1.0   CALCIUM mg/dL 8.7*  --  8.8 9.7   BILIRUBIN mg/dL 0.6  --  0.5 0.7   ALK PHOS U/L 42  --  46 46   ALT (SGPT) U/L 10*  --  9  --    AST (SGOT) U/L 17  --  15 18   GLUCOSE mg/dL 106*  --   --   --        Lab Results   Component Value Date    GLUCOSE 106 (H) 07/10/2019    BUN 9 07/10/2019    CREATININE 0.80 07/10/2019    EGFRIFNONA 68 07/10/2019    EGFRIFAFRI 58 (L) 05/29/2019    BCR 11.3 07/10/2019    K 5.0 07/10/2019    CO2 23.0 07/10/2019    CALCIUM 8.7 (L) 07/10/2019    ALBUMIN 3.50 07/10/2019    LABIL2 1.1 05/25/2018    AST 17 07/10/2019    ALT 10 (L) 07/10/2019     Lab Results   Component Value Date    IRON 42 (L) 05/22/2019     Lab Results   Component Value Date    FOLATE 7.6 01/21/2019     Lab Results   Component Value Date    OCCULTBLD Negative 05/29/2019    OCCULTBLD Negative 11/19/2018     No results found for:  RETICCTPCT  Lab Results   Component Value Date    LKHFOOPI10 521 01/21/2019     No results found for: SPEP, UPEP  No results found for: LDH, URICACID  No results found for: ESTER, RF, SEDRATE  No results found for: FIBRINOGEN, HAPTOGLOBIN  Lab Results   Component Value Date    PTT 25.3 05/27/2018    INR 1.0 05/27/2018     No results found for:   No results found for: CEA  No components found for: CA-19-9  No results found for: PSA  Assessment/Plan   Assessment:    1. MDS.  Patient presented with chronic macrocytic anemia.  Bone marrow biopsy performed on 6/19/19 suggestive of myelodysplastic syndrome,with single lineage dysplasia with genomic alterations :SF3B1 p.[id number], TET2 p., one unclear variant in DNMT3A.. IPSS-R score: 2.64, IPSS-R category: Low. .  Her clinical course seems to be very favorable, and so far she does not need any treatment.  Will need CBC monitoring to evaluate for progression to AML.    2. Anemia:  Macrocytosis.  B12 was low-normal and folate was normal. BM BX shows low grade MDS. Borderline CKD, with creatinine of 1.01 and GFR <60 may also be contributing. She reports vaginal bleeding/spotting.  Bleeding may also be contributing.  Her iron levels are low normal.    PLAN:  1. CBC shows the hemoglobin remained stable.   no need to start her on Procrit..  She does have symptoms of fatigue but that could be from other causes.  2. Platelets and WBC are also stable.  Therefore no plans to start any treatment .  3. Iron levels are low normal, will start her on 1 tablet of iron and see if it may benefit.   4. We will check iron levels again today.  5. RTC in 2 and half months          Orders Placed This Encounter   Procedures   • Ferritin     Labs today     Scheduling Instructions:      Labs today   • Iron Profile     Labs today     Scheduling Instructions:      Labs today   • CBC Auto Differential     AT THE NEXT VISIT (ON ARRIVAL)     Standing Status:   Future     Scheduling Instructions:       AT THE NEXT VISIT (ON ARRIVAL)          Clinically patient was feeling well and fatigue was stable. Bone marrow biopsy results and CT scans were reviewed in detail with the patient and her daughter.  She was advised her bone marrow biopsy results  in combination with peripheral blood CBC data are suggestive of myelodysplastic syndrome, most likely MDS with single lineage dysplasia with genomic alterations :SF3B1 p.[id number], TET2 p.?, one unclear variant in DNMT3. genomic alterations :SF3B1 p.[id number] is associated with ringed sideroblasts and a lower likelihood of transformation to acute myeloid leukemia and a more favorable prognosis. TET2 common in myelodysplastic syndromes and as needed to occur in 20 to 25% of cases.  Of note, check to mutation may also be seen in the setting of age-related clonal hematopoiesis of indeterminate potential.  Prognostic significance of type II and MDS remains under investigation.  DNMT3.is an unclear variant.  This variant has been reported 3 cases of acute mild leukemia and a case of bladder urothelial carcinoma.  Has also been reported in one healthy individual.  Other likely disease associated, due to the paucity of clinical and functional evidence regarding this variant, its clinical significance is currently unclear.  The patient was advised that it was possible for MDS to transform into an acute myeloid leukemia. A study in Blood 2011 118:9667-2427; doi: https://doi.org/10.1182/qabop-[email or web address] (Patricia et al.) demonstrated that TET2 mutation was a poor prognostic factor in patients with intermediate-risk cytogenetics.       Thank you very much for providing the opportunity to participate in this patient’s care. Please do not hesitate to call if there are any other questions    Much of the above report is an electronic transcription//translation of the spoken language to printed text using Dragon Software. As such, the subtleties and finesse of the spoken language  may permit erroneous, or at times, nonsensical words or phrases to be inadvertently transcribed; thus changes may be made at a later date to rectify these errors.

## 2019-12-11 ENCOUNTER — OFFICE VISIT (OUTPATIENT)
Dept: ONCOLOGY | Facility: CLINIC | Age: 84
End: 2019-12-11

## 2019-12-11 ENCOUNTER — APPOINTMENT (OUTPATIENT)
Dept: LAB | Facility: HOSPITAL | Age: 84
End: 2019-12-11

## 2019-12-11 VITALS
HEART RATE: 69 BPM | WEIGHT: 108.8 LBS | TEMPERATURE: 97.5 F | SYSTOLIC BLOOD PRESSURE: 111 MMHG | DIASTOLIC BLOOD PRESSURE: 62 MMHG | HEIGHT: 63 IN | RESPIRATION RATE: 20 BRPM | BODY MASS INDEX: 19.28 KG/M2

## 2019-12-11 DIAGNOSIS — D75.89 MACROCYTOSIS: Primary | ICD-10-CM

## 2019-12-11 DIAGNOSIS — D64.9 ANEMIA, UNSPECIFIED TYPE: ICD-10-CM

## 2019-12-11 DIAGNOSIS — D46.9 MDS (MYELODYSPLASTIC SYNDROME) (HCC): ICD-10-CM

## 2019-12-11 LAB
BASOPHILS # BLD AUTO: 0.05 10*3/MM3 (ref 0–0.2)
BASOPHILS NFR BLD AUTO: 0.8 % (ref 0–1.5)
DEPRECATED RDW RBC AUTO: 52.4 FL (ref 37–54)
EOSINOPHIL # BLD AUTO: 0.16 10*3/MM3 (ref 0–0.4)
EOSINOPHIL NFR BLD AUTO: 2.5 % (ref 0.3–6.2)
ERYTHROCYTE [DISTWIDTH] IN BLOOD BY AUTOMATED COUNT: 14.4 % (ref 12.3–15.4)
FERRITIN SERPL-MCNC: 88.95 NG/ML (ref 13–150)
HCT VFR BLD AUTO: 31.1 % (ref 34–46.6)
HGB BLD-MCNC: 10.1 G/DL (ref 12–15.9)
IRON 24H UR-MRATE: 80 MCG/DL (ref 37–145)
IRON SATN MFR SERPL: 26 % (ref 20–50)
LYMPHOCYTES # BLD AUTO: 2.4 10*3/MM3 (ref 0.7–3.1)
LYMPHOCYTES NFR BLD AUTO: 38 % (ref 19.6–45.3)
MCH RBC QN AUTO: 33.3 PG (ref 26.6–33)
MCHC RBC AUTO-ENTMCNC: 32.5 G/DL (ref 31.5–35.7)
MCV RBC AUTO: 102.6 FL (ref 79–97)
MONOCYTES # BLD AUTO: 0.57 10*3/MM3 (ref 0.1–0.9)
MONOCYTES NFR BLD AUTO: 9 % (ref 5–12)
NEUTROPHILS # BLD AUTO: 3.13 10*3/MM3 (ref 1.7–7)
NEUTROPHILS NFR BLD AUTO: 49.7 % (ref 42.7–76)
PLATELET # BLD AUTO: 221 10*3/MM3 (ref 140–450)
PMV BLD AUTO: 9.3 FL (ref 6–12)
RBC # BLD AUTO: 3.03 10*6/MM3 (ref 3.77–5.28)
TIBC SERPL-MCNC: 307 MCG/DL (ref 298–536)
TRANSFERRIN SERPL-MCNC: 206 MG/DL (ref 200–360)
WBC NRBC COR # BLD: 6.31 10*3/MM3 (ref 3.4–10.8)

## 2019-12-11 PROCEDURE — 84466 ASSAY OF TRANSFERRIN: CPT | Performed by: NURSE PRACTITIONER

## 2019-12-11 PROCEDURE — 36415 COLL VENOUS BLD VENIPUNCTURE: CPT | Performed by: INTERNAL MEDICINE

## 2019-12-11 PROCEDURE — 83540 ASSAY OF IRON: CPT | Performed by: NURSE PRACTITIONER

## 2019-12-11 PROCEDURE — 85025 COMPLETE CBC W/AUTO DIFF WBC: CPT | Performed by: INTERNAL MEDICINE

## 2019-12-11 PROCEDURE — 99214 OFFICE O/P EST MOD 30 MIN: CPT | Performed by: INTERNAL MEDICINE

## 2019-12-11 PROCEDURE — 82728 ASSAY OF FERRITIN: CPT | Performed by: NURSE PRACTITIONER

## 2019-12-11 RX ORDER — FERROUS GLUCONATE 324(37.5)
324 TABLET ORAL
Qty: 30 TABLET | Refills: 2 | Status: SHIPPED | OUTPATIENT
Start: 2019-12-11 | End: 2020-05-26

## 2020-02-25 NOTE — PROGRESS NOTES
Hematology-Oncology Follow-up Note     Name: Devon Crane   : 1932   MRN;[id number]    Primary Care Physician: Devonte Emanuel MD  Referring Physician: Provider, No Known    Reason For Visit:  Chief Complaint   Patient presents with   • Follow-up     chronic macrocytic anemia   Myelodysplastic syndrome      HPI:   Ms. Crane is an 87 y.o. female who presents to our office on 19 for evaluation of anemia.  She had a CBC on 19 at her PCP, Dr. Mcdaniel’s office that showed a hemoglobin of 9.7.  B12 and folate levels were checked and they came back normal.  Creatinine was 1.01 with gfr 50.  LFTs were normal.  Her hemoglobin on 19, during a recent hospitalization, was 9.1.  TSH was also normal on 18.  B12 was 328 (low-normal) on 18.  Patient was referred to us for further evaluation.    • 18 - WBC 5.7, hemoglobin 9.1, platelet count 184,000, .4.  Creatinine 0.9, BUN 10, albumin 3.2, globulin 2.7, total protein 5.8.    • 18 - B12 320.  Folate 9.5.    • 19 - TSH 1.85.    • 19 - WBC 7.7, hemoglobin 9.7, platelet count 264,000, MCV 98.3.  Creatinine 1.01, BUN 15, albumin 3.9, globulin 3.7, bilirubin 0.5, AST 15, ALT 9, alkaline phosphatase 46.  Absolute retic count 43,950.  Retic count percent is 1.5%.  BNP 63.  • 19 - Chest x-ray - Emphysema.     • 2019 - The patient presents for initial consultation.  She reports fatigue.  She denies any bleeding per rectum.  Stool Hemoccult test was checked at Dr. Mcdaniel’s office this past week and it was negative x3.  She reports vaginal bleeding, about twice a week, during the past few months.  Patient says it is minimal.  She had a hysterectomy in .     • 2019 reticulocyte count 1.7%, , ferritin 180, haptoglobin 208, copper level 104, haptoglobin 208, and saturation 9%, serum iron 20 low, TIBC 223 low    • 19 -- Bone marrow biopsy -hypercellular marrow with maturing trilineage hematopoiesis,  dyserythropoiesis including ringed  fibroblast and mild megakaryocytic atypia.  Marrow cellularity: 60 to 70%; blasts equals 1%; iron storage: present with ring sideroblasts (less than 15%); marrow fibrosis absent; Cytogenetics: Normal female karyotype; FISH: Normal MDS panel; next generation sequencing: Detected genomic alterations-SF3B1 p.[id number], TET2 p.?, one unclear variant in DNMT3A; No evidence of acute leukemia, metastatic carcinoma, plasma cell neoplasm, or lymphoma.   the morphologic findings, in combination with peripheral blood CBC data are suggestive of myelodysplastic syndrome, most likely MDS with single lineage dysplasia.  Ring sideroblasts are present in less than 15% of erythroid precursors, which does not meet the criteria for MDS-ROS without the status is of SF 3B1 mutation.  IPSS-R score: 2.64, IPSS-R category: Low. Blood: hemoglobin 9.5, platelet count 237,,000, ANC 5.4.   • 7/2/2019 CT chest abdomen pelvis: No acute abdominal or pelvic abnormality.  Extensive lumbar degenerative disc changes.  Multilevel spinal canal and neural foraminal narrowing.  No evidence of active cardiopulmonary disease.  • 7/10/2019 erythropoietin 14.8 normal  • 9/11/2019  WBC 9.2, Hgb 9.4, Platelets 256K,  • 12/11/2019 WBC 6.3, hemoglobin 10.1, platelets 221, .6  • 12/11/2019, iron 80, ferritin 88, iron saturation 26%, TIBC 307  • 2/26/2020 WBC 6.4, hemoglobin 10.1, platelets 221, ,    Subjective: 02/26/20  The patient is here for a scheduled follow up of anemia.  She takes po iron once daily. She is on O2 at 2l m per nc. Her brother is very sick at the moment and she is stressed.   She denies any fatigue or any B symptoms.  Hemoglobin is stable.  She has been taking iron.      The following portions of the patient's history were reviewed and updated as appropriate: allergies, current medications, past family history, past medical history, past social history, past surgical history and problem list.      Past Medical History:   Diagnosis Date   • Anemia    • CAD (coronary artery disease)    • COPD (chronic obstructive pulmonary disease) (CMS/HCC)    • Dyslipidemia    • GERD (gastroesophageal reflux disease)    • Hearing loss    • MDS (myelodysplastic syndrome) (CMS/HCC)    • Osteoarthritis    • Oxygen dependent    • Paroxysmal A-fib (CMS/HCC)    • Presence of pessary    • Prolapse of female bladder, acquired    • Pulmonary hypertension (CMS/HCC)    • Vaginal bleeding        Past Surgical History:   Procedure Laterality Date   • APPENDECTOMY     • CORONARY ARTERY BYPASS GRAFT  2013   • CYSTOCELE REPAIR     • EYE LENS IMPLANT SECONDARY     • HYSTERECTOMY     • KNEE CARTILAGE SURGERY Right    • SKIN CANCER EXCISION      head and face         Current Outpatient Medications:   •  aspirin 81 MG chewable tablet, Chew 81 mg Daily., Disp: , Rfl:   •  B Complex Vitamins (VITAMIN-B COMPLEX PO), Take  by mouth Daily., Disp: , Rfl:   •  Denosumab (PROLIA SC), Inject  under the skin into the appropriate area as directed Every 6 (Six) Months., Disp: , Rfl:   •  Esomeprazole Magnesium (NEXIUM PO), Take  by mouth., Disp: , Rfl:   •  ferrous gluconate 324 (37.5 Fe) MG tablet tablet, Take 1 tablet by mouth Daily With Breakfast., Disp: 30 tablet, Rfl: 2  •  Multiple Vitamins-Minerals (MULTIVITAMIN ADULT PO), Take  by mouth., Disp: , Rfl:   •  nitroglycerin (NITROSTAT) 0.4 MG SL tablet, Place 1 tablet under the tongue Every 5 (Five) Minutes As Needed for Chest Pain. Take no more than 3 doses in 15 minutes., Disp: 30 tablet, Rfl: 12  •  Parenteral Electrolytes (NUTRILYTE IV), Take  by mouth Daily., Disp: , Rfl:   •  simvastatin (ZOCOR) 40 MG tablet, Take 40 mg by mouth Daily., Disp: , Rfl: 4    Allergies   Allergen Reactions   • Diphenhydramine Swelling     Unknown.        Family History   Problem Relation Age of Onset   • Cancer Sister    • Cancer Brother    • Heart disease Mother    • Heart disease Father        Cancer-related  family history includes Cancer in her brother and sister.    Social History     Tobacco Use   • Smoking status: Never Smoker   • Smokeless tobacco: Never Used   Substance Use Topics   • Alcohol use: No     Frequency: Never   • Drug use: No         ROS:   Review of Systems   Constitutional: Positive for fatigue. Negative for chills and fever.   HENT: Negative for ear pain, mouth sores, nosebleeds and sore throat.    Eyes: Negative for photophobia and visual disturbance.   Respiratory: Positive for shortness of breath ( ). Negative for wheezing and stridor.    Cardiovascular: Negative for chest pain and palpitations.   Gastrointestinal: Negative for abdominal pain, diarrhea, nausea and vomiting.   Endocrine: Negative for cold intolerance and heat intolerance.   Genitourinary: Negative for dysuria and hematuria.   Musculoskeletal: Negative for joint swelling and neck stiffness.   Skin: Negative for color change and rash.   Neurological: Negative for seizures and syncope.   Hematological: Negative for adenopathy.        No obvious bleeding   Psychiatric/Behavioral: Negative for agitation, confusion and hallucinations.   All other systems reviewed and are negative.      Objective:  Vitals:  There were no vitals filed for this visit.  There is no height or weight on file to calculate BMI.      Physical Exam:   Physical Exam   Constitutional: She is oriented to person, place, and time. She appears well-developed and well-nourished. No distress.   HENT:   Head: Normocephalic and atraumatic.   Eyes: Conjunctivae and EOM are normal. Right eye exhibits no discharge. Left eye exhibits no discharge. No scleral icterus.   Neck: Normal range of motion. Neck supple. No thyromegaly present.   Cardiovascular: Normal rate, regular rhythm, normal heart sounds and intact distal pulses. Exam reveals no gallop and no friction rub.   Pulmonary/Chest: No stridor. No respiratory distress. She has no wheezes.   On 2 liters oxygen via nasal  cannula    Abdominal: Soft. Bowel sounds are normal. She exhibits no mass. There is no tenderness. There is no rebound and no guarding.   Musculoskeletal: Normal range of motion. She exhibits no tenderness.   Lymphadenopathy:     She has no cervical adenopathy.   Neurological: She is alert and oriented to person, place, and time. She exhibits normal muscle tone.   Skin: Skin is warm. No rash noted. She is not diaphoretic. No erythema.   Psychiatric: She has a normal mood and affect. Her behavior is normal.   Nursing note and vitals reviewed.        Lab Results - Last 18 Months   Lab Units 12/11/19  0929 09/11/19  1045 07/10/19  1259   WBC 10*3/mm3 6.31 9.28 7.42   HEMOGLOBIN g/dL 10.1* 9.4* 10.0*   HEMATOCRIT % 31.1* 28.8* 30.5*   PLATELETS 10*3/mm3 221 256 264   MCV fL 102.6* 99.7* 102.7*     Lab Results - Last 18 Months   Lab Units 07/10/19  1259 07/02/19  1007 05/29/19  0000 11/12/18  0000   SODIUM mmol/L 135*  --  134* 137   POTASSIUM mmol/L 5.0  --  4.7 4.2   CHLORIDE mmol/L 104  --  102 101   TOTAL CO2 mmol/L  --   --  22 27   CO2 mmol/L 23.0  --   --   --    BUN mg/dL 9  --  15 13   CREATININE mg/dL 0.80 0.80 1.01* 1.0   CALCIUM mg/dL 8.7*  --  8.8 9.7   BILIRUBIN mg/dL 0.6  --  0.5 0.7   ALK PHOS U/L 42  --  46 46   ALT (SGPT) U/L 10*  --  9  --    AST (SGOT) U/L 17  --  15 18   GLUCOSE mg/dL 106*  --   --   --        Lab Results   Component Value Date    GLUCOSE 106 (H) 07/10/2019    BUN 9 07/10/2019    CREATININE 0.80 07/10/2019    EGFRIFNONA 68 07/10/2019    EGFRIFAFRI 58 (L) 05/29/2019    BCR 11.3 07/10/2019    K 5.0 07/10/2019    CO2 23.0 07/10/2019    CALCIUM 8.7 (L) 07/10/2019    ALBUMIN 3.50 07/10/2019    LABIL2 1.1 05/25/2018    AST 17 07/10/2019    ALT 10 (L) 07/10/2019     Lab Results   Component Value Date    IRON 80 12/11/2019    TIBC 307 12/11/2019    FERRITIN 88.95 12/11/2019     Lab Results   Component Value Date    FOLATE 7.6 01/21/2019     Lab Results   Component Value Date    OCCULTBLD  Negative 05/29/2019    OCCULTBLD Negative 11/19/2018     No results found for: RETICCTPCT  Lab Results   Component Value Date    ZSGHOXVI87 521 01/21/2019     No results found for: SPEP, UPEP  No results found for: LDH, URICACID  No results found for: ESTER, RF, SEDRATE  No results found for: FIBRINOGEN, HAPTOGLOBIN  Lab Results   Component Value Date    PTT 25.3 05/27/2018    INR 1.0 05/27/2018     No results found for:   No results found for: CEA  No components found for: CA-19-9  No results found for: PSA  Assessment/Plan   Assessment:    1. MDS.  Patient presented with chronic macrocytic anemia.  Bone marrow biopsy performed on 6/19/19 suggestive of myelodysplastic syndrome,with single lineage dysplasia with genomic alterations :SF3B1 p.[id number], TET2 p., one unclear variant in DNMT3A.. IPSS-R score: 2.64, IPSS-R category: Low. .  Her clinical course seems to be very favorable, and so far she does not need any treatment.  Will need CBC monitoring to evaluate for progression to AML.    2. Anemia:  Macrocytosis.  B12 was low-normal and folate was normal. BM BX shows low grade MDS. Borderline CKD, with creatinine of 1.01 and GFR <60 may also be contributing. She reports vaginal bleeding/spotting.  Bleeding may also be contributing.  Her iron levels are low normal.    PLAN:  1. CBC shows the hemoglobin remained stable.   No need to start her on Procrit..  She does have symptoms of fatigue but that could be from other causes.  2. Discussed the potential risk of MDS progressing because of severe anemia and cytopenias.  3. Platelets and WBC are also stable.  Therefore no plans to start any treatment at this time.  4. Patient is taking iron supplement.  We need to monitor those levels.    5. At this point we may be able to spread her follow-up appointments.  RTC in 4 months          No orders of the defined types were placed in this encounter.          bone marrow biopsy results  in combination with peripheral blood  CBC data are suggestive of myelodysplastic syndrome, most likely MDS with single lineage dysplasia with genomic alterations :SF3B1 p.[id number], TET2 p.?, one unclear variant in DNMT3. genomic alterations :SF3B1 p.[id number] is associated with ringed sideroblasts and a lower likelihood of transformation to acute myeloid leukemia and a more favorable prognosis. TET2 common in myelodysplastic syndromes and as needed to occur in 20 to 25% of cases.  Of note, check to mutation may also be seen in the setting of age-related clonal hematopoiesis of indeterminate potential.  Prognostic significance of type II and MDS remains under investigation.  DNMT3.is an unclear variant.  This variant has been reported 3 cases of acute mild leukemia and a case of bladder urothelial carcinoma.  Has also been reported in one healthy individual.  Other likely disease associated, due to the paucity of clinical and functional evidence regarding this variant, its clinical significance is currently unclear.  The patient was advised that it was possible for MDS to transform into an acute myeloid leukemia. A study in Blood 2011 118:8811-5235; doi: https://doi.org/10.1182/apgfi-[email or web address] (Patricia et al.) demonstrated that TET2 mutation was a poor prognostic factor in patients with intermediate-risk cytogenetics.       Thank you very much for providing the opportunity to participate in this patient’s care. Please do not hesitate to call if there are any other questions    Much of the above report is an electronic transcription//translation of the spoken language to printed text using Dragon Software. As such, the subtleties and finesse of the spoken language may permit erroneous, or at times, nonsensical words or phrases to be inadvertently transcribed; thus changes may be made at a later date to rectify these errors.

## 2020-02-26 ENCOUNTER — OFFICE VISIT (OUTPATIENT)
Dept: LAB | Facility: HOSPITAL | Age: 85
End: 2020-02-26

## 2020-02-26 ENCOUNTER — OFFICE VISIT (OUTPATIENT)
Dept: ONCOLOGY | Facility: CLINIC | Age: 85
End: 2020-02-26

## 2020-02-26 VITALS
BODY MASS INDEX: 20.09 KG/M2 | DIASTOLIC BLOOD PRESSURE: 69 MMHG | TEMPERATURE: 97.8 F | RESPIRATION RATE: 20 BRPM | WEIGHT: 113.4 LBS | HEART RATE: 72 BPM | SYSTOLIC BLOOD PRESSURE: 149 MMHG

## 2020-02-26 DIAGNOSIS — D64.9 ANEMIA, UNSPECIFIED TYPE: ICD-10-CM

## 2020-02-26 DIAGNOSIS — D46.9 MDS (MYELODYSPLASTIC SYNDROME) (HCC): Primary | ICD-10-CM

## 2020-02-26 DIAGNOSIS — D75.89 MACROCYTOSIS: ICD-10-CM

## 2020-02-26 PROBLEM — R42 VERTIGO: Status: ACTIVE | Noted: 2020-02-26

## 2020-02-26 PROBLEM — R92.30 INCONCLUSIVE MAMMOGRAM DUE TO DENSE BREASTS: Status: ACTIVE | Noted: 2020-02-26

## 2020-02-26 PROBLEM — M15.9 PRIMARY OSTEOARTHRITIS INVOLVING MULTIPLE JOINTS: Status: ACTIVE | Noted: 2020-02-26

## 2020-02-26 PROBLEM — R92.2 INCONCLUSIVE MAMMOGRAM DUE TO DENSE BREASTS: Status: ACTIVE | Noted: 2020-02-26

## 2020-02-26 PROBLEM — M94.9 DISORDER OF BONE AND CARTILAGE: Status: ACTIVE | Noted: 2020-02-26

## 2020-02-26 PROBLEM — K21.9 GASTROESOPHAGEAL REFLUX DISEASE WITHOUT ESOPHAGITIS: Status: ACTIVE | Noted: 2020-02-26

## 2020-02-26 PROBLEM — R63.6 UNDERWEIGHT: Status: ACTIVE | Noted: 2020-02-26

## 2020-02-26 PROBLEM — Z23 NEED FOR PROPHYLACTIC VACCINATION WITH COMBINED DIPHTHERIA-TETANUS-PERTUSSIS (DTP) VACCINE: Status: ACTIVE | Noted: 2020-02-26

## 2020-02-26 PROBLEM — H81.03 LABYRINTHINE VERTIGO WITH INVOLVEMENT OF BOTH INNER EARS: Status: ACTIVE | Noted: 2020-02-26

## 2020-02-26 PROBLEM — M89.9 DISORDER OF BONE AND CARTILAGE: Status: ACTIVE | Noted: 2020-02-26

## 2020-02-26 PROBLEM — R06.02 SHORTNESS OF BREATH: Status: ACTIVE | Noted: 2020-02-26

## 2020-02-26 PROBLEM — H90.3 SENSORINEURAL HEARING LOSS (SNHL) OF BOTH EARS: Status: ACTIVE | Noted: 2020-02-26

## 2020-02-26 PROBLEM — Z12.4 CERVICAL CANCER SCREENING: Status: ACTIVE | Noted: 2020-02-26

## 2020-02-26 PROBLEM — I27.20 PULMONARY HYPERTENSION: Status: ACTIVE | Noted: 2020-02-26

## 2020-02-26 PROBLEM — E66.3 OVERWEIGHT (BMI 25.0-29.9): Status: ACTIVE | Noted: 2020-02-26

## 2020-02-26 PROBLEM — Z87.81 HISTORY OF CERVICAL FRACTURE: Status: ACTIVE | Noted: 2020-02-26

## 2020-02-26 PROBLEM — I48.0 PAROXYSMAL ATRIAL FIBRILLATION: Status: ACTIVE | Noted: 2020-02-26

## 2020-02-26 PROBLEM — Z71.89 ENCOUNTER FOR COUNSELING FOR CARE MANAGEMENT OF PATIENT WITH CHRONIC CONDITIONS AND COMPLEX HEALTH NEEDS USING NURSE-BASED MODEL: Status: ACTIVE | Noted: 2020-02-26

## 2020-02-26 PROBLEM — I25.10 CORONARY ARTERIOSCLEROSIS IN NATIVE ARTERY: Status: ACTIVE | Noted: 2020-02-26

## 2020-02-26 LAB
BASOPHILS # BLD AUTO: 0.05 10*3/MM3 (ref 0–0.2)
BASOPHILS NFR BLD AUTO: 0.8 % (ref 0–1.5)
DEPRECATED RDW RBC AUTO: 48.7 FL (ref 37–54)
EOSINOPHIL # BLD AUTO: 0.06 10*3/MM3 (ref 0–0.4)
EOSINOPHIL NFR BLD AUTO: 0.9 % (ref 0.3–6.2)
ERYTHROCYTE [DISTWIDTH] IN BLOOD BY AUTOMATED COUNT: 13.1 % (ref 12.3–15.4)
HCT VFR BLD AUTO: 30.9 % (ref 34–46.6)
HGB BLD-MCNC: 10.1 G/DL (ref 12–15.9)
LYMPHOCYTES # BLD AUTO: 2.24 10*3/MM3 (ref 0.7–3.1)
LYMPHOCYTES NFR BLD AUTO: 34.8 % (ref 19.6–45.3)
MCH RBC QN AUTO: 34.4 PG (ref 26.6–33)
MCHC RBC AUTO-ENTMCNC: 32.7 G/DL (ref 31.5–35.7)
MCV RBC AUTO: 105.1 FL (ref 79–97)
MONOCYTES # BLD AUTO: 0.49 10*3/MM3 (ref 0.1–0.9)
MONOCYTES NFR BLD AUTO: 7.6 % (ref 5–12)
NEUTROPHILS # BLD AUTO: 3.6 10*3/MM3 (ref 1.7–7)
NEUTROPHILS NFR BLD AUTO: 55.9 % (ref 42.7–76)
PLATELET # BLD AUTO: 221 10*3/MM3 (ref 140–450)
PMV BLD AUTO: 9.2 FL (ref 6–12)
RBC # BLD AUTO: 2.94 10*6/MM3 (ref 3.77–5.28)
WBC NRBC COR # BLD: 6.44 10*3/MM3 (ref 3.4–10.8)

## 2020-02-26 PROCEDURE — 85025 COMPLETE CBC W/AUTO DIFF WBC: CPT

## 2020-02-26 PROCEDURE — 99214 OFFICE O/P EST MOD 30 MIN: CPT | Performed by: INTERNAL MEDICINE

## 2020-04-06 RX ORDER — SIMVASTATIN 40 MG
40 TABLET ORAL DAILY
Qty: 90 TABLET | Refills: 0 | Status: SHIPPED | OUTPATIENT
Start: 2020-04-06 | End: 2020-08-04

## 2020-05-21 ENCOUNTER — TELEPHONE (OUTPATIENT)
Dept: FAMILY MEDICINE CLINIC | Facility: CLINIC | Age: 85
End: 2020-05-21

## 2020-05-26 ENCOUNTER — HOSPITAL ENCOUNTER (OUTPATIENT)
Dept: GENERAL RADIOLOGY | Facility: HOSPITAL | Age: 85
Discharge: HOME | End: 2020-05-26
Admitting: FAMILY MEDICINE

## 2020-05-26 ENCOUNTER — OFFICE VISIT (OUTPATIENT)
Dept: FAMILY MEDICINE CLINIC | Facility: CLINIC | Age: 85
End: 2020-05-26

## 2020-05-26 VITALS
HEIGHT: 63 IN | OXYGEN SATURATION: 100 % | WEIGHT: 115.6 LBS | SYSTOLIC BLOOD PRESSURE: 98 MMHG | RESPIRATION RATE: 20 BRPM | HEART RATE: 73 BPM | TEMPERATURE: 98.2 F | BODY MASS INDEX: 20.48 KG/M2 | DIASTOLIC BLOOD PRESSURE: 50 MMHG

## 2020-05-26 DIAGNOSIS — I10 HYPERTENSION, BENIGN: ICD-10-CM

## 2020-05-26 DIAGNOSIS — J43.2 CENTRILOBULAR EMPHYSEMA (HCC): ICD-10-CM

## 2020-05-26 DIAGNOSIS — R63.6 UNDERWEIGHT: ICD-10-CM

## 2020-05-26 DIAGNOSIS — M94.0 COSTOCHONDRITIS: ICD-10-CM

## 2020-05-26 DIAGNOSIS — I48.0 PAROXYSMAL ATRIAL FIBRILLATION (HCC): ICD-10-CM

## 2020-05-26 DIAGNOSIS — E78.2 MIXED HYPERLIPIDEMIA: ICD-10-CM

## 2020-05-26 DIAGNOSIS — R07.81 RIB PAIN ON LEFT SIDE: Primary | ICD-10-CM

## 2020-05-26 DIAGNOSIS — M81.0 OSTEOPOROSIS, POST-MENOPAUSAL: ICD-10-CM

## 2020-05-26 DIAGNOSIS — D46.9 MDS (MYELODYSPLASTIC SYNDROME) (HCC): ICD-10-CM

## 2020-05-26 DIAGNOSIS — I25.719 CORONARY ARTERY DISEASE INVOLVING AUTOLOGOUS VEIN CORONARY BYPASS GRAFT WITH ANGINA PECTORIS (HCC): ICD-10-CM

## 2020-05-26 PROBLEM — D63.8 ANEMIA IN OTHER CHRONIC DISEASES CLASSIFIED ELSEWHERE: Status: RESOLVED | Noted: 2019-06-19 | Resolved: 2020-05-26

## 2020-05-26 PROBLEM — M89.9 DISORDER OF BONE AND CARTILAGE: Status: RESOLVED | Noted: 2020-02-26 | Resolved: 2020-05-26

## 2020-05-26 PROBLEM — D63.8 ANEMIA IN OTHER CHRONIC DISEASES CLASSIFIED ELSEWHERE: Status: ACTIVE | Noted: 2019-06-19

## 2020-05-26 PROBLEM — M94.9 DISORDER OF BONE AND CARTILAGE: Status: RESOLVED | Noted: 2020-02-26 | Resolved: 2020-05-26

## 2020-05-26 PROBLEM — N93.9 VAGINAL BLEEDING: Status: RESOLVED | Noted: 2019-07-10 | Resolved: 2020-05-26

## 2020-05-26 PROBLEM — Z23 NEED FOR PROPHYLACTIC VACCINATION WITH COMBINED DIPHTHERIA-TETANUS-PERTUSSIS (DTP) VACCINE: Status: RESOLVED | Noted: 2020-02-26 | Resolved: 2020-05-26

## 2020-05-26 PROBLEM — Z87.81 HISTORY OF CERVICAL FRACTURE: Status: RESOLVED | Noted: 2020-02-26 | Resolved: 2020-05-26

## 2020-05-26 PROCEDURE — 36415 COLL VENOUS BLD VENIPUNCTURE: CPT | Performed by: FAMILY MEDICINE

## 2020-05-26 PROCEDURE — 99214 OFFICE O/P EST MOD 30 MIN: CPT | Performed by: FAMILY MEDICINE

## 2020-05-26 PROCEDURE — 71101 X-RAY EXAM UNILAT RIBS/CHEST: CPT

## 2020-05-27 LAB
ALBUMIN SERPL-MCNC: 4 G/DL (ref 3.6–4.6)
ALBUMIN/GLOB SERPL: 1.2 {RATIO} (ref 1.2–2.2)
ALP SERPL-CCNC: 40 IU/L (ref 39–117)
ALT SERPL-CCNC: 8 IU/L (ref 0–32)
AST SERPL-CCNC: 16 IU/L (ref 0–40)
BASOPHILS # BLD AUTO: 0 X10E3/UL (ref 0–0.2)
BASOPHILS NFR BLD AUTO: 0 %
BILIRUB SERPL-MCNC: 0.4 MG/DL (ref 0–1.2)
BUN SERPL-MCNC: 15 MG/DL (ref 8–27)
BUN/CREAT SERPL: 15 (ref 12–28)
CALCIUM SERPL-MCNC: 9.5 MG/DL (ref 8.7–10.3)
CHLORIDE SERPL-SCNC: 103 MMOL/L (ref 96–106)
CHOLEST SERPL-MCNC: 126 MG/DL (ref 100–199)
CHOLEST/HDLC SERPL: 1.5 RATIO (ref 0–4.4)
CO2 SERPL-SCNC: 23 MMOL/L (ref 20–29)
CREAT SERPL-MCNC: 1.03 MG/DL (ref 0.57–1)
EOSINOPHIL # BLD AUTO: 0.1 X10E3/UL (ref 0–0.4)
EOSINOPHIL NFR BLD AUTO: 1 %
ERYTHROCYTE [DISTWIDTH] IN BLOOD BY AUTOMATED COUNT: 12.9 % (ref 11.7–15.4)
GLOBULIN SER CALC-MCNC: 3.3 G/DL (ref 1.5–4.5)
GLUCOSE SERPL-MCNC: 100 MG/DL (ref 65–99)
HCT VFR BLD AUTO: 29.3 % (ref 34–46.6)
HDLC SERPL-MCNC: 85 MG/DL
HGB BLD-MCNC: 9.7 G/DL (ref 11.1–15.9)
IMM GRANULOCYTES # BLD AUTO: 0 X10E3/UL (ref 0–0.1)
IMM GRANULOCYTES NFR BLD AUTO: 0 %
LDLC SERPL CALC-MCNC: 31 MG/DL (ref 0–99)
LYMPHOCYTES # BLD AUTO: 2.4 X10E3/UL (ref 0.7–3.1)
LYMPHOCYTES NFR BLD AUTO: 36 %
MCH RBC QN AUTO: 33.6 PG (ref 26.6–33)
MCHC RBC AUTO-ENTMCNC: 33.1 G/DL (ref 31.5–35.7)
MCV RBC AUTO: 101 FL (ref 79–97)
MONOCYTES # BLD AUTO: 0.7 X10E3/UL (ref 0.1–0.9)
MONOCYTES NFR BLD AUTO: 10 %
MORPHOLOGY BLD-IMP: ABNORMAL
NEUTROPHILS # BLD AUTO: 3.5 X10E3/UL (ref 1.4–7)
NEUTROPHILS NFR BLD AUTO: 53 %
PLATELET # BLD AUTO: 221 X10E3/UL (ref 150–450)
POTASSIUM SERPL-SCNC: 4.2 MMOL/L (ref 3.5–5.2)
PROT SERPL-MCNC: 7.3 G/DL (ref 6–8.5)
RBC # BLD AUTO: 2.89 X10E6/UL (ref 3.77–5.28)
SODIUM SERPL-SCNC: 141 MMOL/L (ref 134–144)
TRIGL SERPL-MCNC: 50 MG/DL (ref 0–149)
TSH SERPL DL<=0.005 MIU/L-ACNC: 4.24 UIU/ML (ref 0.45–4.5)
VLDLC SERPL CALC-MCNC: 10 MG/DL (ref 5–40)
WBC # BLD AUTO: 6.6 X10E3/UL (ref 3.4–10.8)

## 2020-06-02 NOTE — ASSESSMENT & PLAN NOTE
Hypertension is improving with treatment.  Continue current treatment regimen.  Dietary sodium restriction.  Blood pressure will be reassessed in 3 months.

## 2020-06-17 ENCOUNTER — LAB (OUTPATIENT)
Dept: LAB | Facility: HOSPITAL | Age: 85
End: 2020-06-17

## 2020-06-17 DIAGNOSIS — D64.9 ANEMIA, UNSPECIFIED TYPE: ICD-10-CM

## 2020-06-17 DIAGNOSIS — D75.89 MACROCYTOSIS: ICD-10-CM

## 2020-06-17 LAB
BASOPHILS # BLD AUTO: 0.06 10*3/MM3 (ref 0–0.2)
BASOPHILS NFR BLD AUTO: 0.8 % (ref 0–1.5)
DEPRECATED RDW RBC AUTO: 47.5 FL (ref 37–54)
EOSINOPHIL # BLD AUTO: 0.14 10*3/MM3 (ref 0–0.4)
EOSINOPHIL NFR BLD AUTO: 2 % (ref 0.3–6.2)
ERYTHROCYTE [DISTWIDTH] IN BLOOD BY AUTOMATED COUNT: 13 % (ref 12.3–15.4)
FERRITIN SERPL-MCNC: 133.3 NG/ML (ref 13–150)
HCT VFR BLD AUTO: 30.6 % (ref 34–46.6)
HGB BLD-MCNC: 10 G/DL (ref 12–15.9)
IRON 24H UR-MRATE: 77 MCG/DL (ref 37–145)
IRON SATN MFR SERPL: 25 % (ref 20–50)
LYMPHOCYTES # BLD AUTO: 2.78 10*3/MM3 (ref 0.7–3.1)
LYMPHOCYTES NFR BLD AUTO: 39.3 % (ref 19.6–45.3)
MCH RBC QN AUTO: 34.4 PG (ref 26.6–33)
MCHC RBC AUTO-ENTMCNC: 32.7 G/DL (ref 31.5–35.7)
MCV RBC AUTO: 105.2 FL (ref 79–97)
MONOCYTES # BLD AUTO: 0.65 10*3/MM3 (ref 0.1–0.9)
MONOCYTES NFR BLD AUTO: 9.2 % (ref 5–12)
NEUTROPHILS # BLD AUTO: 3.44 10*3/MM3 (ref 1.7–7)
NEUTROPHILS NFR BLD AUTO: 48.7 % (ref 42.7–76)
PLATELET # BLD AUTO: 205 10*3/MM3 (ref 140–450)
PMV BLD AUTO: 9.1 FL (ref 6–12)
RBC # BLD AUTO: 2.91 10*6/MM3 (ref 3.77–5.28)
TIBC SERPL-MCNC: 305 MCG/DL (ref 298–536)
TRANSFERRIN SERPL-MCNC: 205 MG/DL (ref 200–360)
WBC NRBC COR # BLD: 7.07 10*3/MM3 (ref 3.4–10.8)

## 2020-06-17 PROCEDURE — 85025 COMPLETE CBC W/AUTO DIFF WBC: CPT

## 2020-06-17 PROCEDURE — 84466 ASSAY OF TRANSFERRIN: CPT

## 2020-06-17 PROCEDURE — 36415 COLL VENOUS BLD VENIPUNCTURE: CPT

## 2020-06-17 PROCEDURE — 83540 ASSAY OF IRON: CPT

## 2020-06-17 PROCEDURE — 82728 ASSAY OF FERRITIN: CPT

## 2020-06-24 ENCOUNTER — OFFICE VISIT (OUTPATIENT)
Dept: ONCOLOGY | Facility: CLINIC | Age: 85
End: 2020-06-24

## 2020-06-24 ENCOUNTER — APPOINTMENT (OUTPATIENT)
Dept: LAB | Facility: HOSPITAL | Age: 85
End: 2020-06-24

## 2020-06-24 VITALS
RESPIRATION RATE: 18 BRPM | HEIGHT: 63 IN | BODY MASS INDEX: 20.27 KG/M2 | SYSTOLIC BLOOD PRESSURE: 131 MMHG | HEART RATE: 90 BPM | WEIGHT: 114.4 LBS | DIASTOLIC BLOOD PRESSURE: 72 MMHG | TEMPERATURE: 97.8 F

## 2020-06-24 DIAGNOSIS — D46.9 MDS (MYELODYSPLASTIC SYNDROME) (HCC): Primary | ICD-10-CM

## 2020-06-24 DIAGNOSIS — D64.9 ANEMIA, UNSPECIFIED TYPE: ICD-10-CM

## 2020-06-24 PROCEDURE — 99214 OFFICE O/P EST MOD 30 MIN: CPT | Performed by: INTERNAL MEDICINE

## 2020-06-24 NOTE — PROGRESS NOTES
Hematology-Oncology Follow-up Note     Name: Devon Crane   : 1932   MRN;[id number]    Primary Care Physician: Devonte Emanuel MD  Referring Physician: Devonte mEanuel MD    Reason For Visit:  Chief Complaint   Patient presents with   • Follow-up     MDS (myelodysplastic syndrome)   Myelodysplastic syndrome      HPI:   Ms. Crane is an 87 y.o. female who presents to our office on 19 for evaluation of anemia.  She had a CBC on 19 at her PCP, Dr. Mcdaniel’s office that showed a hemoglobin of 9.7.  B12 and folate levels were checked and they came back normal.  Creatinine was 1.01 with gfr 50.  LFTs were normal.  Her hemoglobin on 19, during a recent hospitalization, was 9.1.  TSH was also normal on 18.  B12 was 328 (low-normal) on 18.  Patient was referred to us for further evaluation.    • 18 - WBC 5.7, hemoglobin 9.1, platelet count 184,000, .4.  Creatinine 0.9, BUN 10, albumin 3.2, globulin 2.7, total protein 5.8.    • 18 - B12 320.  Folate 9.5.    • 19 - TSH 1.85.    • 19 - WBC 7.7, hemoglobin 9.7, platelet count 264,000, MCV 98.3.  Creatinine 1.01, BUN 15, albumin 3.9, globulin 3.7, bilirubin 0.5, AST 15, ALT 9, alkaline phosphatase 46.  Absolute retic count 43,950.  Retic count percent is 1.5%.  BNP 63.  • 19 - Chest x-ray - Emphysema.     • 2019 - The patient presents for initial consultation.  She reports fatigue.  She denies any bleeding per rectum.  Stool Hemoccult test was checked at Dr. Mcdaniel’s office this past week and it was negative x3.  She reports vaginal bleeding, about twice a week, during the past few months.  Patient says it is minimal.  She had a hysterectomy in .     • 2019 reticulocyte count 1.7%, , ferritin 180, haptoglobin 208, copper level 104, haptoglobin 208, and saturation 9%, serum iron 20 low, TIBC 223 low    • 19 -- Bone marrow biopsy -hypercellular marrow with maturing trilineage hematopoiesis,  dyserythropoiesis including ringed  fibroblast and mild megakaryocytic atypia.  Marrow cellularity: 60 to 70%; blasts equals 1%; iron storage: present with ring sideroblasts (less than 15%); marrow fibrosis absent; Cytogenetics: Normal female karyotype; FISH: Normal MDS panel; next generation sequencing: Detected genomic alterations-SF3B1 [id number], TET2 p.?, one unclear variant in DNMT3A; No evidence of acute leukemia, metastatic carcinoma, plasma cell neoplasm, or lymphoma.   the morphologic findings, in combination with peripheral blood CBC data are suggestive of myelodysplastic syndrome, most likely MDS with single lineage dysplasia.  Ring sideroblasts are present in less than 15% of erythroid precursors, which does not meet the criteria for MDS-ROS without the status is of SF 3B1 mutation.  IPSS-R score: 2.64, IPSS-R category: Low. Blood: hemoglobin 9.5, platelet count 237,,000, ANC 5.4.   • 7/2/2019 CT chest abdomen pelvis: No acute abdominal or pelvic abnormality.  Extensive lumbar degenerative disc changes.  Multilevel spinal canal and neural foraminal narrowing.  No evidence of active cardiopulmonary disease.  • 7/10/2019 erythropoietin 14.8 normal  • 9/11/2019  WBC 9.2, Hgb 9.4, Platelets 256K,  • 12/11/2019 WBC 6.3, hemoglobin 10.1, platelets 221, .6  • 12/11/2019, iron 80, ferritin 88, iron saturation 26%, TIBC 307  • 2/26/2020 WBC 6.4, hemoglobin 10.1, platelets 221, ,  • 5/26/2020: TSH 4.24, creatinine 1.03, LFTs normal, WBC 6.6, hemoglobin 9.7, , platelets 221,  • 5/26/2020: Patient received Prolia 60 mg.  • 6/17/2020 iron 77, iron saturation 25%, TIBC 305, ferritin 133, WBC 7.07, hemoglobin 10, .2, platelets 205,    Subjective:   The patient is here for a scheduled follow up of anemia. She is accompanied by her daughter at today's visit.  She is no longer taking iron. Her daughter states that it upset her stomach. She takes 81mg ASA daily. Her daughter states that she  had a recent fall. She is currently on portable o2. She had a Prolia injection on 05/26/2020.  She denies any fatigue or any B symptoms.  Hemoglobin is stable.    She has been practicing social distancing due to the ongoing coronavirus pandemic.  She denies any fatigue.  The following portions of the patient's history were reviewed and updated as appropriate: allergies, current medications, past family history, past medical history, past social history, past surgical history and problem list.     Past Medical History:   Diagnosis Date   • Anemia    • CAD (coronary artery disease)    • COPD (chronic obstructive pulmonary disease) (CMS/HCC)    • Dyslipidemia    • GERD (gastroesophageal reflux disease)    • Hearing loss    • MDS (myelodysplastic syndrome) (CMS/HCC)    • Osteoarthritis    • Oxygen dependent    • Paroxysmal A-fib (CMS/HCC)    • Presence of pessary    • Prolapse of female bladder, acquired    • Pulmonary hypertension (CMS/HCC)    • Vaginal bleeding        Past Surgical History:   Procedure Laterality Date   • APPENDECTOMY     • CORONARY ARTERY BYPASS GRAFT  2013   • CYSTOCELE REPAIR     • EYE LENS IMPLANT SECONDARY     • HYSTERECTOMY     • KNEE CARTILAGE SURGERY Right    • SKIN CANCER EXCISION      head and face         Current Outpatient Medications:   •  aspirin 81 MG chewable tablet, Chew 81 mg Every Other Day., Disp: , Rfl:   •  B Complex Vitamins (VITAMIN-B COMPLEX PO), Take  by mouth Daily., Disp: , Rfl:   •  esomeprazole (nexIUM) 20 MG packet, Take 1 tablet by mouth Daily., Disp: , Rfl:   •  Multiple Vitamins-Minerals (MULTIVITAMIN ADULT PO), Take 1 tablet by mouth Daily., Disp: , Rfl:   •  Parenteral Electrolytes (NUTRILYTE IV), Take  by mouth Daily., Disp: , Rfl:   •  simvastatin (ZOCOR) 40 MG tablet, Take 1 tablet by mouth Daily., Disp: 90 tablet, Rfl: 0  •  nitroglycerin (NITROSTAT) 0.4 MG SL tablet, Place 1 tablet under the tongue Every 5 (Five) Minutes As Needed for Chest Pain. Take no more  "than 3 doses in 15 minutes., Disp: 30 tablet, Rfl: 12    Allergies   Allergen Reactions   • Diphenhydramine Swelling     Unknown.        Family History   Problem Relation Age of Onset   • Cancer Sister    • Cancer Brother    • Heart disease Mother    • Heart disease Father        Cancer-related family history includes Cancer in her brother and sister.    Social History     Tobacco Use   • Smoking status: Never Smoker   • Smokeless tobacco: Never Used   Substance Use Topics   • Alcohol use: No     Frequency: Never   • Drug use: No         ROS:   Review of Systems   Constitutional: Positive for fatigue. Negative for chills and fever.   HENT: Negative for ear pain, mouth sores, nosebleeds and sore throat.    Eyes: Negative for photophobia and visual disturbance.   Respiratory: Negative for shortness of breath ( ), wheezing and stridor.         Uses oxygen-Nasal cannula    Cardiovascular: Negative for chest pain and palpitations.   Gastrointestinal: Negative for abdominal pain, diarrhea, nausea and vomiting.   Endocrine: Negative for cold intolerance and heat intolerance.   Genitourinary: Negative for dysuria and hematuria.   Musculoskeletal: Negative for joint swelling and neck stiffness.   Skin: Negative for color change and rash.   Neurological: Negative for seizures, syncope and facial asymmetry.   Hematological: Negative for adenopathy.        No obvious bleeding   Psychiatric/Behavioral: Negative for agitation, confusion and hallucinations.   All other systems reviewed and are negative.      Objective:  Vitals:  Vitals:    06/24/20 0945   BP: 131/72   Pulse: 90   Resp: 18   Temp: 97.8 °F (36.6 °C)   Weight: 51.9 kg (114 lb 6.4 oz)   Height: 160 cm (63\")   PainSc: 0-No pain     Body mass index is 20.27 kg/m².      Physical Exam:   Physical Exam   Constitutional: She is oriented to person, place, and time. She appears well-developed and well-nourished. No distress.   Frail   HENT:   Head: Normocephalic and " atraumatic.   Eyes: Conjunctivae and EOM are normal. Right eye exhibits no discharge. Left eye exhibits no discharge. No scleral icterus.   Neck: Normal range of motion. Neck supple. No thyromegaly present.   Cardiovascular: Normal rate, regular rhythm, normal heart sounds and intact distal pulses. Exam reveals no gallop and no friction rub.   Pulmonary/Chest: No stridor. No respiratory distress. She has no wheezes.   On 2 liters oxygen via nasal cannula    Abdominal: Soft. Bowel sounds are normal. She exhibits no mass. There is no tenderness. There is no rebound and no guarding.   Musculoskeletal: Normal range of motion. She exhibits no tenderness or deformity.   Lymphadenopathy:     She has no cervical adenopathy.   Neurological: She is alert and oriented to person, place, and time. She exhibits normal muscle tone.   Skin: Skin is warm. No rash noted. She is not diaphoretic. No erythema.   Psychiatric: She has a normal mood and affect. Her behavior is normal.   Nursing note and vitals reviewed.        Lab Results - Last 18 Months   Lab Units 06/17/20  0946 05/26/20  1030 02/26/20  1007   WBC 10*3/mm3 7.07 6.6 6.44   HEMOGLOBIN g/dL 10.0* 9.7* 10.1*   HEMATOCRIT % 30.6* 29.3* 30.9*   PLATELETS 10*3/mm3 205 221 221   MCV fL 105.2* 101* 105.1*     Lab Results - Last 18 Months   Lab Units 05/26/20  1030 07/10/19  1259 07/02/19  1007 05/29/19  0000   SODIUM mmol/L 141 135*  --  134*   POTASSIUM mmol/L 4.2 5.0  --  4.7   CHLORIDE mmol/L 103 104  --  102   TOTAL CO2 mmol/L 23  --   --  22   CO2 mmol/L  --  23.0  --   --    BUN mg/dL 15 9  --  15   CREATININE mg/dL 1.03* 0.80 0.80 1.01*   CALCIUM mg/dL 9.5 8.7*  --  8.8   BILIRUBIN mg/dL 0.4 0.6  --  0.5   ALK PHOS IU/L 40 42  --  46   ALT (SGPT) IU/L 8 10*  --  9   AST (SGOT) IU/L 16 17  --  15   GLUCOSE mg/dL  --  106*  --   --        Lab Results   Component Value Date    GLUCOSE 106 (H) 07/10/2019    BUN 15 05/26/2020    CREATININE 1.03 (H) 05/26/2020    EGFRIFNONA 49  (L) 05/26/2020    EGFRIFAFRI 56 (L) 05/26/2020    BCR 15 05/26/2020    K 4.2 05/26/2020    CO2 23 05/26/2020    CALCIUM 9.5 05/26/2020    PROTENTOTREF 7.3 05/26/2020    ALBUMIN 4.0 05/26/2020    LABIL2 1.2 05/26/2020    AST 16 05/26/2020    ALT 8 05/26/2020     Lab Results   Component Value Date    IRON 77 06/17/2020    TIBC 305 06/17/2020    FERRITIN 133.30 06/17/2020     Lab Results   Component Value Date    FOLATE 7.6 01/21/2019     Lab Results   Component Value Date    OCCULTBLD Negative 05/29/2019    OCCULTBLD Negative 11/19/2018     No results found for: RETICCTPCT  Lab Results   Component Value Date    AWFPTIPZ25 521 01/21/2019     No results found for: SPEP, UPEP  No results found for: LDH, URICACID  No results found for: ESTER, RF, SEDRATE  No results found for: FIBRINOGEN, HAPTOGLOBIN  Lab Results   Component Value Date    PTT 25.3 05/27/2018    INR 1.0 05/27/2018     No results found for:   No results found for: CEA  No components found for: CA-19-9  No results found for: PSA  Assessment/Plan   Assessment:    1. MDS.  Patient presented with chronic macrocytic anemia.  Bone marrow biopsy performed on 6/19/19 suggestive of myelodysplastic syndrome,with single lineage dysplasia with genomic alterations :SF3B1 p.[id number], TET2 p., one unclear variant in DNMT3A.. IPSS-R score: 2.64, IPSS-R category: Low. .  Her clinical course seems to be very favorable, and so far she does not need any treatment.  Will need CBC monitoring to evaluate for progression to AML.  So for hemoglobin stable, WBC and platelets remain normal.    2. Anemia:  Macrocytosis.  B12 was low-normal and folate was normal. BM BX shows low grade MDS. Borderline CKD, with creatinine of 1.01 and GFR <60 may also be contributing. She reports vaginal bleeding/spotting.  Bleeding may also be contributing.  Her iron levels are low normal.  3. Osteoporosis: Patient on Prolia.  Recommend vitamin D plus calcium supplement.      PLAN:  1. CBC shows the  hemoglobin remained stable.   No need to start her on Procrit..  She does have symptoms of fatigue but that could be from other causes.  2. Discussed the potential risk of MDS progressing because of severe anemia and cytopenias.  3. Continue Prolia for osteoporosis.  4. Platelets and WBC are also stable.  Therefore no plans to start any treatment at this time.  5. Patient has stopped taking iron.  Iron levels are stable.  6. RTC in 4 months          No orders of the defined types were placed in this encounter.          bone marrow biopsy results  in combination with peripheral blood CBC data are suggestive of myelodysplastic syndrome, most likely MDS with single lineage dysplasia with genomic alterations :SF3B1 p.[id number], TET2 p.?, one unclear variant in DNMT3. genomic alterations :SF3B1 p.[id number] is associated with ringed sideroblasts and a lower likelihood of transformation to acute myeloid leukemia and a more favorable prognosis. TET2 common in myelodysplastic syndromes and as needed to occur in 20 to 25% of cases.  Of note, check to mutation may also be seen in the setting of age-related clonal hematopoiesis of indeterminate potential.  Prognostic significance of type II and MDS remains under investigation.  DNMT3.is an unclear variant.  This variant has been reported 3 cases of acute mild leukemia and a case of bladder urothelial carcinoma.  Has also been reported in one healthy individual.  Other likely disease associated, due to the paucity of clinical and functional evidence regarding this variant, its clinical significance is currently unclear.  The patient was advised that it was possible for MDS to transform into an acute myeloid leukemia. A study in Blood 2011 118:3232-5134; doi: https://doi.org/10.1182/eyfip-2873-43-511931 (Patricia et al.) demonstrated that TET2 mutation was a poor prognostic factor in patients with intermediate-risk cytogenetics.       Thank you very much for providing the  opportunity to participate in this patient’s care. Please do not hesitate to call if there are any other questions        Electronically signed by Devonte Emanuel MD, 06/24/20, 10:25 AM.

## 2020-08-04 RX ORDER — SIMVASTATIN 40 MG
40 TABLET ORAL DAILY
Qty: 90 TABLET | Refills: 3 | Status: SHIPPED | OUTPATIENT
Start: 2020-08-04 | End: 2021-09-13 | Stop reason: SDUPTHER

## 2020-08-18 PROBLEM — H15.109 EPISCLERITIS: Status: ACTIVE | Noted: 2018-10-08

## 2020-08-18 PROBLEM — I10 ESSENTIAL HYPERTENSION: Status: ACTIVE | Noted: 2020-08-18

## 2020-08-21 ENCOUNTER — OFFICE VISIT (OUTPATIENT)
Dept: CARDIOLOGY | Facility: CLINIC | Age: 85
End: 2020-08-21

## 2020-08-21 VITALS
WEIGHT: 119 LBS | DIASTOLIC BLOOD PRESSURE: 75 MMHG | HEIGHT: 63 IN | SYSTOLIC BLOOD PRESSURE: 150 MMHG | BODY MASS INDEX: 21.09 KG/M2 | HEART RATE: 70 BPM

## 2020-08-21 DIAGNOSIS — I10 ESSENTIAL HYPERTENSION: ICD-10-CM

## 2020-08-21 DIAGNOSIS — E78.2 MIXED HYPERLIPIDEMIA: ICD-10-CM

## 2020-08-21 DIAGNOSIS — I25.810 CORONARY ARTERY DISEASE INVOLVING AUTOLOGOUS VEIN CORONARY BYPASS GRAFT WITHOUT ANGINA PECTORIS: Primary | ICD-10-CM

## 2020-08-21 DIAGNOSIS — I48.0 PAROXYSMAL ATRIAL FIBRILLATION (HCC): ICD-10-CM

## 2020-08-21 PROCEDURE — 99214 OFFICE O/P EST MOD 30 MIN: CPT | Performed by: INTERNAL MEDICINE

## 2020-08-21 PROCEDURE — 93000 ELECTROCARDIOGRAM COMPLETE: CPT | Performed by: INTERNAL MEDICINE

## 2020-08-21 NOTE — PROGRESS NOTES
Date of Office Visit: 2020  Encounter Provider: Dr. Cristopher Oliver  Place of Service: [facility] CARDIOLOGY [place]  Patient Name: Devon Crane  :1932  Devonte Emanuel MD    Chief Complaint   Patient presents with   • Coronary Artery Disease     1 year follow up   • Atrial Fibrillation   • Hypertension   • Hyperlipidemia     History of Present Illness    I am pleased to see Mrs. Crane in my office today as a follow-up    As you know, patient is 88 years old white female whose past medical history is significant for advanced COPD, home oxygen, CAD, CABG, who came today  for follow-up    In , patient underwent CABG x1 with LIMA to LAD.  Patient did well.  Left ventricular function was preserved.  In May 2018, patient was admitted at [facility] and underwent stress test which was abnormal.  Subsequent cardiac catheterization showed normal coronary arteries and LIMA to LAD is patent.  Echocardiogram showed EF of 70% and tricuspid regurgitation was noted with pulmonary artery pressure was 47 mm of mercury.    This is a yearly follow-up of Mrs. Crane.  Patient is frail and weak.  She is not very active.  Patient is on home oxygen.  Patient was diagnosed with myelodysplastic syndrome last year and initially was being considered for chemotherapy.  However she has maintained her blood counts within desirable range and chemotherapy was not required.  Patient is short of breath but that is baseline.  Patient denies any chest pain.  No syncope or presyncope.  No leg edema noted.    EKG showed sinus rhythm.  Right bundle branch block is noted.  Isolated PACs present.    From a cardiac standpoint patient is stable.  If patient has to come off aspirin I would be okay with this idea.  At present it is being continued.  I would not proceed with any further testing.  We shall see the patient in 1 year..        Past Medical History:   Diagnosis Date   • Anemia    • CAD (coronary artery disease)    •  COPD (chronic obstructive pulmonary disease) (CMS/HCC)    • Dyslipidemia    • GERD (gastroesophageal reflux disease)    • Hearing loss    • Hyperlipidemia    • Hypertension    • MDS (myelodysplastic syndrome) (CMS/HCC)    • Osteoarthritis    • Oxygen dependent    • Paroxysmal A-fib (CMS/HCC)    • Presence of pessary    • Prolapse of female bladder, acquired    • Pulmonary hypertension (CMS/HCC)    • Vaginal bleeding          Past Surgical History:   Procedure Laterality Date   • APPENDECTOMY     • CORONARY ARTERY BYPASS GRAFT  2013   • CYSTOCELE REPAIR     • EYE LENS IMPLANT SECONDARY     • HYSTERECTOMY     • KNEE CARTILAGE SURGERY Right    • SKIN CANCER EXCISION      head and face           Current Outpatient Medications:   •  aspirin 81 MG chewable tablet, Chew 81 mg Every Other Day., Disp: , Rfl:   •  B Complex Vitamins (VITAMIN-B COMPLEX PO), Take  by mouth Daily., Disp: , Rfl:   •  esomeprazole (nexIUM) 20 MG packet, Take 1 tablet by mouth Daily., Disp: , Rfl:   •  Multiple Vitamins-Minerals (MULTIVITAMIN ADULT PO), Take 1 tablet by mouth Daily., Disp: , Rfl:   •  nitroglycerin (NITROSTAT) 0.4 MG SL tablet, Place 1 tablet under the tongue Every 5 (Five) Minutes As Needed for Chest Pain. Take no more than 3 doses in 15 minutes., Disp: 30 tablet, Rfl: 12  •  Parenteral Electrolytes (NUTRILYTE IV), Take  by mouth Daily., Disp: , Rfl:   •  simvastatin (ZOCOR) 40 MG tablet, Take 1 tablet by mouth Daily., Disp: 90 tablet, Rfl: 3      Social History     Socioeconomic History   • Marital status:      Spouse name: Not on file   • Number of children: Not on file   • Years of education: Not on file   • Highest education level: Not on file   Tobacco Use   • Smoking status: Never Smoker   • Smokeless tobacco: Never Used   Substance and Sexual Activity   • Alcohol use: No     Frequency: Never   • Drug use: No   • Sexual activity: Defer         Review of Systems   Constitution: Negative for chills and fever.   HENT:  "Negative for ear discharge and nosebleeds.    Eyes: Negative for discharge and redness.   Cardiovascular: Negative for chest pain, orthopnea, palpitations, paroxysmal nocturnal dyspnea and syncope.   Respiratory: Positive for shortness of breath. Negative for cough and wheezing.    Endocrine: Negative for heat intolerance.   Skin: Negative for rash.   Musculoskeletal: Positive for arthritis and joint pain. Negative for myalgias.   Gastrointestinal: Negative for abdominal pain, melena, nausea and vomiting.   Genitourinary: Negative for dysuria and hematuria.   Neurological: Negative for dizziness, light-headedness, numbness and tremors.   Psychiatric/Behavioral: Negative for depression. The patient is not nervous/anxious.        Procedures      ECG 12 Lead  Date/Time: 8/21/2020 10:10 AM  Performed by: Cristopher Oliver MD  Authorized by: Cristopher Oliver MD   Comparison: compared with previous ECG   Similar to previous ECG  Rhythm: sinus rhythm  Conduction: right bundle branch block    Clinical impression: abnormal EKG            ECG 12 Lead    (Results Pending)           Objective:    /75   Pulse 70   Ht 160 cm (62.99\")   Wt 54 kg (119 lb)   LMP  (LMP Unknown)   BMI 21.09 kg/m²         Physical Exam   Constitutional: She is oriented to person, place, and time. She appears well-developed and well-nourished.   HENT:   Head: Normocephalic and atraumatic.   Eyes: No scleral icterus.   Neck: No thyromegaly present.   Cardiovascular: Normal rate, regular rhythm and normal heart sounds. Exam reveals no gallop and no friction rub.   No murmur heard.  Pulmonary/Chest: Effort normal. No respiratory distress. She has wheezes. She has no rales.   Abdominal: There is no tenderness.   Musculoskeletal: She exhibits no edema.   Lymphadenopathy:     She has no cervical adenopathy.   Neurological: She is alert and oriented to person, place, and time.   Skin: No rash noted. No erythema.   Psychiatric: She has a normal mood and " affect.           Assessment:       Diagnosis Plan   1. Coronary artery disease involving autologous vein coronary bypass graft without angina pectoris  ECG 12 Lead   2. Mixed hyperlipidemia  ECG 12 Lead   3. Paroxysmal atrial fibrillation (CMS/HCC)  ECG 12 Lead   4. Essential hypertension  ECG 12 Lead            Plan:       At present patient is doing fairly well from cardiovascular standpoint.  Her blood pressure is slightly elevated but I would not start any antihypertensive regimen because of her age and previously blood pressure was desirable.  Maintain blood pressure log at home.  I will see the patient in 1 year.

## 2020-09-22 ENCOUNTER — OFFICE VISIT (OUTPATIENT)
Dept: FAMILY MEDICINE CLINIC | Facility: CLINIC | Age: 85
End: 2020-09-22

## 2020-09-22 VITALS
SYSTOLIC BLOOD PRESSURE: 138 MMHG | BODY MASS INDEX: 21.05 KG/M2 | DIASTOLIC BLOOD PRESSURE: 70 MMHG | OXYGEN SATURATION: 99 % | HEART RATE: 94 BPM | HEIGHT: 63 IN | RESPIRATION RATE: 18 BRPM | WEIGHT: 118.8 LBS | TEMPERATURE: 98 F

## 2020-09-22 DIAGNOSIS — Z23 NEED FOR INFLUENZA VACCINATION: ICD-10-CM

## 2020-09-22 DIAGNOSIS — I48.0 PAROXYSMAL ATRIAL FIBRILLATION (HCC): ICD-10-CM

## 2020-09-22 DIAGNOSIS — N30.90 CYSTITIS: Primary | ICD-10-CM

## 2020-09-22 DIAGNOSIS — D46.9 MDS (MYELODYSPLASTIC SYNDROME) (HCC): ICD-10-CM

## 2020-09-22 DIAGNOSIS — J43.2 CENTRILOBULAR EMPHYSEMA (HCC): ICD-10-CM

## 2020-09-22 DIAGNOSIS — I25.810 CORONARY ARTERY DISEASE INVOLVING AUTOLOGOUS VEIN CORONARY BYPASS GRAFT WITHOUT ANGINA PECTORIS: ICD-10-CM

## 2020-09-22 LAB
BILIRUB BLD-MCNC: NEGATIVE MG/DL
CLARITY, POC: ABNORMAL
COLOR UR: YELLOW
GLUCOSE UR STRIP-MCNC: NEGATIVE MG/DL
KETONES UR QL: NEGATIVE
LEUKOCYTE EST, POC: ABNORMAL
NITRITE UR-MCNC: POSITIVE MG/ML
PH UR: 6 [PH] (ref 5–8)
PROT UR STRIP-MCNC: NEGATIVE MG/DL
RBC # UR STRIP: ABNORMAL /UL
SP GR UR: 1.01 (ref 1–1.03)
UROBILINOGEN UR QL: NORMAL

## 2020-09-22 PROCEDURE — 90694 VACC AIIV4 NO PRSRV 0.5ML IM: CPT | Performed by: FAMILY MEDICINE

## 2020-09-22 PROCEDURE — G0008 ADMIN INFLUENZA VIRUS VAC: HCPCS | Performed by: FAMILY MEDICINE

## 2020-09-22 PROCEDURE — 99214 OFFICE O/P EST MOD 30 MIN: CPT | Performed by: FAMILY MEDICINE

## 2020-09-22 PROCEDURE — 81002 URINALYSIS NONAUTO W/O SCOPE: CPT | Performed by: FAMILY MEDICINE

## 2020-09-22 RX ORDER — DENOSUMAB 60 MG/ML
60 INJECTION SUBCUTANEOUS ONCE
COMMUNITY
End: 2022-01-01

## 2020-09-22 RX ORDER — SULFAMETHOXAZOLE AND TRIMETHOPRIM 800; 160 MG/1; MG/1
1 TABLET ORAL 2 TIMES DAILY
Qty: 20 TABLET | Refills: 0 | Status: SHIPPED | OUTPATIENT
Start: 2020-09-22 | End: 2020-10-21

## 2020-09-25 LAB
BACTERIA UR CULT: ABNORMAL
BACTERIA UR CULT: ABNORMAL
OTHER ANTIBIOTIC SUSC ISLT: ABNORMAL

## 2020-10-03 NOTE — PATIENT INSTRUCTIONS

## 2020-10-20 NOTE — PROGRESS NOTES
Hematology-Oncology Follow-up Note     Name: Devon Crane   : 1932   MRN;[id number]    Primary Care Physician: Yelitza cMdaniel MD  Referring Physician: Yelitza Mcdaniel MD    Reason For Visit:  Chief Complaint   Patient presents with   • Follow-up     MDS (myelodysplastic syndrome)    Myelodysplastic syndrome      HPI:   Ms. Crane is an 88 y.o. female who presents to our office on 19 for evaluation of anemia.  She had a CBC on 19 at her PCP, Dr. Mcdaniel’s office that showed a hemoglobin of 9.7.  B12 and folate levels were checked and they came back normal.  Creatinine was 1.01 with gfr 50.  LFTs were normal.  Her hemoglobin on 19, during a recent hospitalization, was 9.1.  TSH was also normal on 18.  B12 was 328 (low-normal) on 18.  Patient was referred to us for further evaluation.    • 18 - WBC 5.7, hemoglobin 9.1, platelet count 184,000, .4.  Creatinine 0.9, BUN 10, albumin 3.2, globulin 2.7, total protein 5.8.    • 18 - B12 320.  Folate 9.5.    • 19 - TSH 1.85.    • 19 - WBC 7.7, hemoglobin 9.7, platelet count 264,000, MCV 98.3.  Creatinine 1.01, BUN 15, albumin 3.9, globulin 3.7, bilirubin 0.5, AST 15, ALT 9, alkaline phosphatase 46.  Absolute retic count 43,950.  Retic count percent is 1.5%.  BNP 63.  • 19 - Chest x-ray - Emphysema.     • 2019 - The patient presents for initial consultation.  She reports fatigue.  She denies any bleeding per rectum.  Stool Hemoccult test was checked at Dr. Mcdaniel’s office this past week and it was negative x3.  She reports vaginal bleeding, about twice a week, during the past few months.  Patient says it is minimal.  She had a hysterectomy in .     • 2019 reticulocyte count 1.7%, , ferritin 180, haptoglobin 208, copper level 104, haptoglobin 208, and saturation 9%, serum iron 20 low, TIBC 223 low    • 19 -- Bone marrow biopsy -hypercellular marrow with maturing trilineage  hematopoiesis, dyserythropoiesis including ringed  fibroblast and mild megakaryocytic atypia.  Marrow cellularity: 60 to 70%; blasts equals 1%; iron storage: present with ring sideroblasts (less than 15%); marrow fibrosis absent; Cytogenetics: Normal female karyotype; FISH: Normal MDS panel; next generation sequencing: Detected genomic alterations-SF3B1 [id number], TET2 p.?, one unclear variant in DNMT3A; No evidence of acute leukemia, metastatic carcinoma, plasma cell neoplasm, or lymphoma.   the morphologic findings, in combination with peripheral blood CBC data are suggestive of myelodysplastic syndrome, most likely MDS with single lineage dysplasia.  Ring sideroblasts are present in less than 15% of erythroid precursors, which does not meet the criteria for MDS-ROS without the status is of SF 3B1 mutation.  IPSS-R score: 2.64, IPSS-R category: Low. Blood: hemoglobin 9.5, platelet count 237,,000, ANC 5.4.   • 7/2/2019 CT chest abdomen pelvis: No acute abdominal or pelvic abnormality.  Extensive lumbar degenerative disc changes.  Multilevel spinal canal and neural foraminal narrowing.  No evidence of active cardiopulmonary disease.  • 7/10/2019 erythropoietin 14.8 normal  • 9/11/2019  WBC 9.2, Hgb 9.4, Platelets 256K,  • 12/11/2019 WBC 6.3, hemoglobin 10.1, platelets 221, .6  • 12/11/2019, iron 80, ferritin 88, iron saturation 26%, TIBC 307  • 2/26/2020 WBC 6.4, hemoglobin 10.1, platelets 221, ,  • 5/26/2020: TSH 4.24, creatinine 1.03, LFTs normal, WBC 6.6, hemoglobin 9.7, , platelets 221,  • 5/26/2020: Patient received Prolia 60 mg.  • 6/17/2020 iron 77, iron saturation 25%, TIBC 305, ferritin 133, WBC 7.07, hemoglobin 10, .2, platelets 205,  • 9/22/2020 urine culture shows E. coli  • 10/21/2020: WBC 6.06, hemoglobin 9.5, platelets 172    Subjective:   The patient is here for a scheduled follow up of anemia.  She had a urinary tract infection recently.  She is accompanied by her  daughter at today's visit. She is no longer taking iron. . She takes 81mg ASA daily.  She had a Prolia injection on 05/26/2020.  She denies any fatigue or any B symptoms.  Hemoglobin is lower but not much.  Does not report any fatigue. She has been practicing social distancing due to the ongoing coronavirus pandemic.    The following portions of the patient's history were reviewed and updated as appropriate: allergies, current medications, past family history, past medical history, past social history, past surgical history and problem list.     Past Medical History:   Diagnosis Date   • Anemia    • CAD (coronary artery disease)    • COPD (chronic obstructive pulmonary disease) (CMS/[facility])    • Dyslipidemia    • GERD (gastroesophageal reflux disease)    • Hearing loss    • Hyperlipidemia    • Hypertension    • MDS (myelodysplastic syndrome) (CMS/[facility])    • Osteoarthritis    • Oxygen dependent    • Paroxysmal A-fib (CMS/[facility])    • Presence of pessary    • Prolapse of female bladder, acquired    • Pulmonary hypertension (CMS/[facility])    • Vaginal bleeding        Past Surgical History:   Procedure Laterality Date   • APPENDECTOMY  05/1947   • BONE MARROW BIOPSY  07/10/2019   • CARDIAC CATHETERIZATION  02/19/2003, 06/22/2004, 01/16/2007, 12/2010, 10/06/2014,05/27/2018   • CHOLECYSTECTOMY  02/06/2007   • CORONARY ARTERY BYPASS GRAFT  2013   • CYSTOCELE REPAIR  05/1990   • EYE LENS IMPLANT SECONDARY  04/2000, 05/2000   • HYSTERECTOMY  04/1963   • KNEE CARTILAGE SURGERY Right 01/10/2001   • SKIN CANCER EXCISION      head and face         Current Outpatient Medications:   •  aspirin 81 MG chewable tablet, Chew 81 mg Every Other Day., Disp: , Rfl:   •  B Complex Vitamins (VITAMIN-B COMPLEX PO), Take  by mouth Daily., Disp: , Rfl:   •  denosumab (Prolia) 60 MG/ML solution prefilled syringe syringe, Inject 60 mg under the skin into the appropriate area as directed 1 (One) Time., Disp: , Rfl:   •  esomeprazole (nexIUM) 20 MG packet,  Take 1 tablet by mouth Daily., Disp: , Rfl:   •  Multiple Vitamins-Minerals (MULTIVITAMIN ADULT PO), Take 1 tablet by mouth Daily., Disp: , Rfl:   •  nitroglycerin (NITROSTAT) 0.4 MG SL tablet, Place 1 tablet under the tongue Every 5 (Five) Minutes As Needed for Chest Pain. Take no more than 3 doses in 15 minutes., Disp: 30 tablet, Rfl: 12  •  Parenteral Electrolytes (NUTRILYTE IV), Take  by mouth Daily., Disp: , Rfl:   •  simvastatin (ZOCOR) 40 MG tablet, Take 1 tablet by mouth Daily., Disp: 90 tablet, Rfl: 3    Allergies   Allergen Reactions   • Diphenhydramine Swelling     Unknown.        Family History   Problem Relation Age of Onset   • Cancer Sister    • Heart disease Sister    • Cancer Brother    • Heart disease Brother    • Diabetes Brother    • Heart disease Mother    • Stroke Mother    • Heart disease Father        Cancer-related family history includes Cancer in her brother and sister.    Social History     Tobacco Use   • Smoking status: Never Smoker   • Smokeless tobacco: Never Used   Substance Use Topics   • Alcohol use: No     Frequency: Never   • Drug use: No       ROS:   Review of Systems   Constitutional: Negative for chills, fatigue and fever.   HENT: Negative for ear pain, mouth sores, nosebleeds and sore throat.    Eyes: Negative for photophobia and visual disturbance.   Respiratory: Negative for shortness of breath ( ), wheezing and stridor.         Uses oxygen-Nasal cannula    Cardiovascular: Negative for chest pain and palpitations.   Gastrointestinal: Negative for abdominal pain, diarrhea, nausea and vomiting.   Endocrine: Negative for cold intolerance and heat intolerance.   Genitourinary: Negative for difficulty urinating, dysuria and hematuria.   Musculoskeletal: Negative for joint swelling and neck stiffness.   Skin: Negative for color change and rash.   Neurological: Negative for seizures, syncope and facial asymmetry.   Hematological: Negative for adenopathy.        No obvious bleeding  "  Psychiatric/Behavioral: Negative for agitation, confusion and hallucinations.   All other systems reviewed and are negative.      Objective:  Vitals:  Vitals:    10/21/20 0904   BP: 133/80   Pulse: 91   Resp: 16   Temp: 97.3 °F (36.3 °C)   Weight: 53.6 kg (118 lb 3.2 oz)   Height: 160 cm (63\")     Body mass index is 20.94 kg/m².    Physical Exam:   Physical Exam   Constitutional: She is oriented to person, place, and time. She appears well-developed. No distress.   Frail   HENT:   Head: Normocephalic and atraumatic.   Eyes: Conjunctivae are normal. Right eye exhibits no discharge. Left eye exhibits no discharge. No scleral icterus.   Neck: Normal range of motion. Neck supple. No thyromegaly present.   Cardiovascular: Normal rate, regular rhythm and normal heart sounds. Exam reveals no gallop and no friction rub.   Pulmonary/Chest: Effort normal. No stridor. No respiratory distress. She has no wheezes.   On 2 liters oxygen via nasal cannula    Abdominal: Soft. Normal appearance and bowel sounds are normal. She exhibits no mass. There is no abdominal tenderness. There is no rebound and no guarding.   Musculoskeletal: Normal range of motion. No tenderness, deformity or signs of injury.   Lymphadenopathy:     She has no cervical adenopathy.   Neurological: She is alert and oriented to person, place, and time. She exhibits normal muscle tone.   Skin: Skin is warm. No rash noted. She is not diaphoretic. No erythema.   Psychiatric: Her behavior is normal. Mood normal.   Nursing note and vitals reviewed.        Lab Results - Last 18 Months   Lab Units 10/21/20  0848 06/17/20  0946 05/26/20  1030   WBC 10*3/mm3 6.06 7.07 6.6   HEMOGLOBIN g/dL 9.5* 10.0* 9.7*   HEMATOCRIT % 28.8* 30.6* 29.3*   PLATELETS 10*3/mm3 172 205 221   MCV fL 101.8* 105.2* 101*     Lab Results - Last 18 Months   Lab Units 05/26/20  1030 07/10/19  1259 07/02/19  1007 05/29/19  0000   SODIUM mmol/L 141 135*  --  134*   POTASSIUM mmol/L 4.2 5.0  --  4.7 "   CHLORIDE mmol/L 103 104  --  102   TOTAL CO2 mmol/L 23  --   --  22   CO2 mmol/L  --  23.0  --   --    BUN mg/dL 15 9  --  15   CREATININE mg/dL 1.03* 0.80 0.80 1.01*   CALCIUM mg/dL 9.5 8.7*  --  8.8   BILIRUBIN mg/dL 0.4 0.6  --  0.5   ALK PHOS IU/L 40 42  --  46   ALT (SGPT) IU/L 8 10*  --  9   AST (SGOT) IU/L 16 17  --  15   GLUCOSE mg/dL  --  106*  --   --        Lab Results   Component Value Date    GLUCOSE 106 (H) 07/10/2019    BUN 15 05/26/2020    CREATININE 1.03 (H) 05/26/2020    EGFRIFNONA 49 (L) 05/26/2020    EGFRIFAFRI 56 (L) 05/26/2020    BCR 15 05/26/2020    K 4.2 05/26/2020    CO2 23 05/26/2020    CALCIUM 9.5 05/26/2020    PROTENTOTREF 7.3 05/26/2020    ALBUMIN 4.0 05/26/2020    LABIL2 1.2 05/26/2020    AST 16 05/26/2020    ALT 8 05/26/2020     Lab Results   Component Value Date    IRON 77 06/17/2020    TIBC 305 06/17/2020    FERRITIN 133.30 06/17/2020     Lab Results   Component Value Date    FOLATE 7.6 01/21/2019     Lab Results   Component Value Date    OCCULTBLD Negative 05/29/2019    OCCULTBLD Negative 11/19/2018     No results found for: RETICCTPCT  Lab Results   Component Value Date    SGRSCAIG97 521 01/21/2019     No results found for: SPEP, UPEP  No results found for: LDH, URICACID  No results found for: ESTER, RF, SEDRATE  No results found for: FIBRINOGEN, HAPTOGLOBIN  Lab Results   Component Value Date    PTT 25.3 05/27/2018    INR 1.0 05/27/2018     No results found for:   No results found for: CEA  No components found for: CA-19-9  No results found for: PSA  Assessment/Plan   Assessment:    1. MDS.  Patient presented with chronic macrocytic anemia.  Bone marrow biopsy performed on 6/19/19 suggestive of myelodysplastic syndrome,with single lineage dysplasia with genomic alterations :SF3B1 [id number], TET2 p., one unclear variant in DNMT3A.. IPSS-R score: 2.64, IPSS-R category: Low. .  Her clinical course seems to be very favorable, and so far she does not need any treatment.  Will  need CBC monitoring to evaluate for progression to AML.  So for hemoglobin stable, WBC and platelets remain normal.  2. Anemia:  Macrocytosis.  B12 was low-normal and folate was normal. BM BX shows low grade MDS. Borderline CKD, with creatinine of 1.01 and GFR <60 may also be contributing. She reports vaginal bleeding/spotting.  Bleeding may also be contributing.  Her iron levels are low normal.  3. Osteoporosis: Patient on Prolia.  Recommend vitamin D plus calcium supplement.  4. Recent E. coli UTI.      PLAN:  1. Hemoglobin slightly worse, but platelets and white count are stable.   No plans to start her on Procrit..  She does have symptoms of fatigue but that could be from other causes.  2. Discussed the potential risk of MDS progressing because of severe anemia and cytopenias.  3. Continue Prolia for osteoporosis.  4. Patient has stopped taking iron.  Iron levels are stable.  5. RTC in 4 months      I have reviewed and confirmed the accuracy of the patient's history: Chief complaint, HPI, ROS and Subjective as entered by the MA/LPN/RN. Devonte Emanuel MD 10/21/20       Orders Placed This Encounter   Procedures   • CBC Auto Differential   • CBC & Differential          Electronically signed by Devonte Emanuel MD, 10/21/20, 9:42 AM EDT.

## 2020-10-21 ENCOUNTER — APPOINTMENT (OUTPATIENT)
Dept: LAB | Facility: HOSPITAL | Age: 85
End: 2020-10-21

## 2020-10-21 ENCOUNTER — OFFICE VISIT (OUTPATIENT)
Dept: ONCOLOGY | Facility: CLINIC | Age: 85
End: 2020-10-21

## 2020-10-21 VITALS
DIASTOLIC BLOOD PRESSURE: 80 MMHG | HEIGHT: 63 IN | BODY MASS INDEX: 20.94 KG/M2 | WEIGHT: 118.2 LBS | HEART RATE: 91 BPM | RESPIRATION RATE: 16 BRPM | SYSTOLIC BLOOD PRESSURE: 133 MMHG | TEMPERATURE: 97.3 F

## 2020-10-21 DIAGNOSIS — D46.9 MDS (MYELODYSPLASTIC SYNDROME) (HCC): Primary | ICD-10-CM

## 2020-10-21 LAB
BASOPHILS # BLD AUTO: 0.03 10*3/MM3 (ref 0–0.2)
BASOPHILS NFR BLD AUTO: 0.5 % (ref 0–1.5)
DEPRECATED RDW RBC AUTO: 49.3 FL (ref 37–54)
EOSINOPHIL # BLD AUTO: 0.06 10*3/MM3 (ref 0–0.4)
EOSINOPHIL NFR BLD AUTO: 1 % (ref 0.3–6.2)
ERYTHROCYTE [DISTWIDTH] IN BLOOD BY AUTOMATED COUNT: 13.8 % (ref 12.3–15.4)
HCT VFR BLD AUTO: 28.8 % (ref 34–46.6)
HGB BLD-MCNC: 9.5 G/DL (ref 12–15.9)
LYMPHOCYTES # BLD AUTO: 2.06 10*3/MM3 (ref 0.7–3.1)
LYMPHOCYTES NFR BLD AUTO: 34 % (ref 19.6–45.3)
MCH RBC QN AUTO: 33.6 PG (ref 26.6–33)
MCHC RBC AUTO-ENTMCNC: 33 G/DL (ref 31.5–35.7)
MCV RBC AUTO: 101.8 FL (ref 79–97)
MONOCYTES # BLD AUTO: 0.49 10*3/MM3 (ref 0.1–0.9)
MONOCYTES NFR BLD AUTO: 8.1 % (ref 5–12)
NEUTROPHILS NFR BLD AUTO: 3.42 10*3/MM3 (ref 1.7–7)
NEUTROPHILS NFR BLD AUTO: 56.4 % (ref 42.7–76)
PLATELET # BLD AUTO: 172 10*3/MM3 (ref 140–450)
PMV BLD AUTO: 9.5 FL (ref 6–12)
RBC # BLD AUTO: 2.83 10*6/MM3 (ref 3.77–5.28)
WBC # BLD AUTO: 6.06 10*3/MM3 (ref 3.4–10.8)

## 2020-10-21 PROCEDURE — 85025 COMPLETE CBC W/AUTO DIFF WBC: CPT | Performed by: INTERNAL MEDICINE

## 2020-10-21 PROCEDURE — 36415 COLL VENOUS BLD VENIPUNCTURE: CPT | Performed by: INTERNAL MEDICINE

## 2020-10-21 PROCEDURE — 99214 OFFICE O/P EST MOD 30 MIN: CPT | Performed by: INTERNAL MEDICINE

## 2020-12-01 ENCOUNTER — CLINICAL SUPPORT (OUTPATIENT)
Dept: FAMILY MEDICINE CLINIC | Facility: CLINIC | Age: 85
End: 2020-12-01

## 2020-12-01 DIAGNOSIS — M81.0 OSTEOPOROSIS, POST-MENOPAUSAL: Primary | ICD-10-CM

## 2020-12-01 PROCEDURE — 96372 THER/PROPH/DIAG INJ SC/IM: CPT | Performed by: FAMILY MEDICINE

## 2021-01-01 ENCOUNTER — TELEPHONE (OUTPATIENT)
Dept: CARDIOLOGY | Facility: CLINIC | Age: 86
End: 2021-01-01

## 2021-01-01 ENCOUNTER — APPOINTMENT (OUTPATIENT)
Dept: LAB | Facility: HOSPITAL | Age: 86
End: 2021-01-01

## 2021-01-01 ENCOUNTER — HOSPITAL ENCOUNTER (OUTPATIENT)
Dept: MRI IMAGING | Facility: HOSPITAL | Age: 86
Discharge: HOME | End: 2021-11-02
Admitting: INTERNAL MEDICINE

## 2021-01-01 ENCOUNTER — OFFICE VISIT (OUTPATIENT)
Dept: ONCOLOGY | Facility: CLINIC | Age: 86
End: 2021-01-01

## 2021-01-01 ENCOUNTER — HOSPITAL ENCOUNTER (OUTPATIENT)
Dept: CARDIOLOGY | Facility: HOSPITAL | Age: 86
Discharge: HOME | End: 2021-11-22

## 2021-01-01 ENCOUNTER — HOSPITAL ENCOUNTER (OUTPATIENT)
Dept: RESPIRATORY THERAPY | Facility: HOSPITAL | Age: 86
Discharge: HOME | End: 2021-11-22

## 2021-01-01 ENCOUNTER — OFFICE VISIT (OUTPATIENT)
Dept: CARDIOLOGY | Facility: CLINIC | Age: 86
End: 2021-01-01

## 2021-01-01 ENCOUNTER — TELEPHONE (OUTPATIENT)
Dept: FAMILY MEDICINE CLINIC | Facility: CLINIC | Age: 86
End: 2021-01-01

## 2021-01-01 VITALS
SYSTOLIC BLOOD PRESSURE: 138 MMHG | RESPIRATION RATE: 18 BRPM | TEMPERATURE: 97.3 F | OXYGEN SATURATION: 98 % | BODY MASS INDEX: 20.91 KG/M2 | WEIGHT: 118 LBS | HEART RATE: 85 BPM | DIASTOLIC BLOOD PRESSURE: 82 MMHG | HEIGHT: 63 IN

## 2021-01-01 VITALS
HEIGHT: 63 IN | SYSTOLIC BLOOD PRESSURE: 150 MMHG | BODY MASS INDEX: 20.2 KG/M2 | DIASTOLIC BLOOD PRESSURE: 80 MMHG | WEIGHT: 114 LBS | HEART RATE: 72 BPM | OXYGEN SATURATION: 99 %

## 2021-01-01 DIAGNOSIS — F44.89 CONFUSION STATE: ICD-10-CM

## 2021-01-01 DIAGNOSIS — I25.810 CORONARY ARTERY DISEASE INVOLVING AUTOLOGOUS VEIN CORONARY BYPASS GRAFT WITHOUT ANGINA PECTORIS: Primary | ICD-10-CM

## 2021-01-01 DIAGNOSIS — I48.0 PAROXYSMAL ATRIAL FIBRILLATION (HCC): ICD-10-CM

## 2021-01-01 DIAGNOSIS — R55 SYNCOPE AND COLLAPSE: ICD-10-CM

## 2021-01-01 DIAGNOSIS — D46.9 MDS (MYELODYSPLASTIC SYNDROME) (HCC): Primary | ICD-10-CM

## 2021-01-01 DIAGNOSIS — I27.20 PULMONARY HYPERTENSION (HCC): ICD-10-CM

## 2021-01-01 DIAGNOSIS — D64.9 ANEMIA, UNSPECIFIED TYPE: ICD-10-CM

## 2021-01-01 DIAGNOSIS — I25.810 CORONARY ARTERY DISEASE INVOLVING AUTOLOGOUS VEIN CORONARY BYPASS GRAFT WITHOUT ANGINA PECTORIS: ICD-10-CM

## 2021-01-01 DIAGNOSIS — D46.9 MDS (MYELODYSPLASTIC SYNDROME) (HCC): ICD-10-CM

## 2021-01-01 LAB
ALBUMIN SERPL-MCNC: 3.8 G/DL (ref 3.5–5.2)
ALBUMIN/GLOB SERPL: 1.3 G/DL
ALP SERPL-CCNC: 45 U/L (ref 39–117)
ALT SERPL W P-5'-P-CCNC: <5 U/L (ref 1–33)
ANION GAP SERPL CALCULATED.3IONS-SCNC: 13 MMOL/L (ref 5–15)
AST SERPL-CCNC: 13 U/L (ref 1–32)
BASOPHILS # BLD AUTO: 0.13 10*3/MM3 (ref 0–0.2)
BASOPHILS NFR BLD AUTO: 2.3 % (ref 0–1.5)
BH CV ECHO MEAS - ACS: 2.1 CM
BH CV ECHO MEAS - AO MAX PG (FULL): 2.2 MMHG
BH CV ECHO MEAS - AO MAX PG: 6.8 MMHG
BH CV ECHO MEAS - AO MEAN PG (FULL): 1.8 MMHG
BH CV ECHO MEAS - AO MEAN PG: 4.2 MMHG
BH CV ECHO MEAS - AO ROOT AREA (BSA CORRECTED): 2
BH CV ECHO MEAS - AO ROOT AREA: 7.2 CM^2
BH CV ECHO MEAS - AO ROOT DIAM: 3 CM
BH CV ECHO MEAS - AO V2 MAX: 130.3 CM/SEC
BH CV ECHO MEAS - AO V2 MEAN: 98.5 CM/SEC
BH CV ECHO MEAS - AO V2 VTI: 36.8 CM
BH CV ECHO MEAS - ASC AORTA: 3.1 CM
BH CV ECHO MEAS - AVA(I,A): 1.7 CM^2
BH CV ECHO MEAS - AVA(I,D): 1.7 CM^2
BH CV ECHO MEAS - AVA(V,A): 1.9 CM^2
BH CV ECHO MEAS - AVA(V,D): 1.9 CM^2
BH CV ECHO MEAS - BSA(HAYCOCK): 1.5 M^2
BH CV ECHO MEAS - BSA: 1.5 M^2
BH CV ECHO MEAS - BZI_BMI: 20.2 KILOGRAMS/M^2
BH CV ECHO MEAS - BZI_METRIC_HEIGHT: 160 CM
BH CV ECHO MEAS - BZI_METRIC_WEIGHT: 51.7 KG
BH CV ECHO MEAS - EDV(CUBED): 65.5 ML
BH CV ECHO MEAS - EDV(MOD-SP4): 34 ML
BH CV ECHO MEAS - EDV(TEICH): 71.3 ML
BH CV ECHO MEAS - EF(CUBED): 70.5 %
BH CV ECHO MEAS - EF(MOD-BP): 56 %
BH CV ECHO MEAS - EF(MOD-SP4): 56.3 %
BH CV ECHO MEAS - EF(TEICH): 62.7 %
BH CV ECHO MEAS - ESV(CUBED): 19.3 ML
BH CV ECHO MEAS - ESV(MOD-SP4): 14.8 ML
BH CV ECHO MEAS - ESV(TEICH): 26.6 ML
BH CV ECHO MEAS - FS: 33.4 %
BH CV ECHO MEAS - IVS/LVPW: 0.83
BH CV ECHO MEAS - IVSD: 0.81 CM
BH CV ECHO MEAS - LA DIMENSION: 3 CM
BH CV ECHO MEAS - LA/AO: 1
BH CV ECHO MEAS - LV DIASTOLIC VOL/BSA (35-75): 22.3 ML/M^2
BH CV ECHO MEAS - LV MASS(C)D: 110.1 GRAMS
BH CV ECHO MEAS - LV MASS(C)DI: 72.3 GRAMS/M^2
BH CV ECHO MEAS - LV MAX PG: 4.6 MMHG
BH CV ECHO MEAS - LV MEAN PG: 2.5 MMHG
BH CV ECHO MEAS - LV SYSTOLIC VOL/BSA (12-30): 9.7 ML/M^2
BH CV ECHO MEAS - LV V1 MAX: 106.7 CM/SEC
BH CV ECHO MEAS - LV V1 MEAN: 73.8 CM/SEC
BH CV ECHO MEAS - LV V1 VTI: 27.3 CM
BH CV ECHO MEAS - LVIDD: 4 CM
BH CV ECHO MEAS - LVIDS: 2.7 CM
BH CV ECHO MEAS - LVOT AREA: 2.4 CM^2
BH CV ECHO MEAS - LVOT DIAM: 1.7 CM
BH CV ECHO MEAS - LVPWD: 0.98 CM
BH CV ECHO MEAS - MR MAX PG: 89.6 MMHG
BH CV ECHO MEAS - MR MAX VEL: 473.3 CM/SEC
BH CV ECHO MEAS - MV A MAX VEL: 105.2 CM/SEC
BH CV ECHO MEAS - MV DEC SLOPE: 567.4 CM/SEC^2
BH CV ECHO MEAS - MV DEC TIME: 0.23 SEC
BH CV ECHO MEAS - MV E MAX VEL: 130.6 CM/SEC
BH CV ECHO MEAS - MV E/A: 1.2
BH CV ECHO MEAS - MV MAX PG: 7.3 MMHG
BH CV ECHO MEAS - MV MEAN PG: 2.8 MMHG
BH CV ECHO MEAS - MV V2 MAX: 135 CM/SEC
BH CV ECHO MEAS - MV V2 MEAN: 76 CM/SEC
BH CV ECHO MEAS - MV V2 VTI: 42.6 CM
BH CV ECHO MEAS - MVA(VTI): 1.5 CM^2
BH CV ECHO MEAS - PA ACC TIME: 0.11 SEC
BH CV ECHO MEAS - PA MAX PG (FULL): 0.7 MMHG
BH CV ECHO MEAS - PA MAX PG: 3.3 MMHG
BH CV ECHO MEAS - PA MEAN PG (FULL): 0.29 MMHG
BH CV ECHO MEAS - PA MEAN PG: 1.8 MMHG
BH CV ECHO MEAS - PA PR(ACCEL): 27.6 MMHG
BH CV ECHO MEAS - PA V2 MAX: 90.4 CM/SEC
BH CV ECHO MEAS - PA V2 MEAN: 64.6 CM/SEC
BH CV ECHO MEAS - PA V2 VTI: 20.6 CM
BH CV ECHO MEAS - PI END-D VEL: 117.9 CM/SEC
BH CV ECHO MEAS - PI MAX PG: 14.5 MMHG
BH CV ECHO MEAS - PI MAX VEL: 190.4 CM/SEC
BH CV ECHO MEAS - PULM A REVS DUR: 0.13 SEC
BH CV ECHO MEAS - PULM A REVS VEL: 29.3 CM/SEC
BH CV ECHO MEAS - PULM DIAS VEL: 54.7 CM/SEC
BH CV ECHO MEAS - PULM S/D: 1.1
BH CV ECHO MEAS - PULM SYS VEL: 62.2 CM/SEC
BH CV ECHO MEAS - RAP SYSTOLE: 3 MMHG
BH CV ECHO MEAS - RV MAX PG: 2.6 MMHG
BH CV ECHO MEAS - RV MEAN PG: 1.5 MMHG
BH CV ECHO MEAS - RV V1 MAX: 80.2 CM/SEC
BH CV ECHO MEAS - RV V1 MEAN: 58.2 CM/SEC
BH CV ECHO MEAS - RV V1 VTI: 19.7 CM
BH CV ECHO MEAS - RVDD: 2.8 CM
BH CV ECHO MEAS - RVSP: 32.1 MMHG
BH CV ECHO MEAS - SI(AO): 175.1 ML/M^2
BH CV ECHO MEAS - SI(CUBED): 30.3 ML/M^2
BH CV ECHO MEAS - SI(LVOT): 42.3 ML/M^2
BH CV ECHO MEAS - SI(MOD-SP4): 12.6 ML/M^2
BH CV ECHO MEAS - SI(TEICH): 29.3 ML/M^2
BH CV ECHO MEAS - SV(AO): 266.7 ML
BH CV ECHO MEAS - SV(CUBED): 46.2 ML
BH CV ECHO MEAS - SV(LVOT): 64.4 ML
BH CV ECHO MEAS - SV(MOD-SP4): 19.1 ML
BH CV ECHO MEAS - SV(TEICH): 44.7 ML
BH CV ECHO MEAS - TR MAX VEL: 269.1 CM/SEC
BILIRUB SERPL-MCNC: 0.4 MG/DL (ref 0–1.2)
BUN SERPL-MCNC: 11 MG/DL (ref 8–23)
BUN/CREAT SERPL: 13.9 (ref 7–25)
CALCIUM SPEC-SCNC: 8.2 MG/DL (ref 8.6–10.5)
CHLORIDE SERPL-SCNC: 106 MMOL/L (ref 98–107)
CO2 SERPL-SCNC: 22 MMOL/L (ref 22–29)
CREAT SERPL-MCNC: 0.79 MG/DL (ref 0.57–1)
DEPRECATED RDW RBC AUTO: 52 FL (ref 37–54)
EOSINOPHIL # BLD AUTO: 0.17 10*3/MM3 (ref 0–0.4)
EOSINOPHIL NFR BLD AUTO: 3 % (ref 0.3–6.2)
ERYTHROCYTE [DISTWIDTH] IN BLOOD BY AUTOMATED COUNT: 13.4 % (ref 12.3–15.4)
GFR SERPL CREATININE-BSD FRML MDRD: 69 ML/MIN/1.73
GLOBULIN UR ELPH-MCNC: 2.9 GM/DL
GLUCOSE SERPL-MCNC: 92 MG/DL (ref 65–99)
HCT VFR BLD AUTO: 31.3 % (ref 34–46.6)
HGB BLD-MCNC: 9.4 G/DL (ref 12–15.9)
LYMPHOCYTES # BLD AUTO: 2.25 10*3/MM3 (ref 0.7–3.1)
LYMPHOCYTES NFR BLD AUTO: 39.9 % (ref 19.6–45.3)
MAXIMAL PREDICTED HEART RATE: 131 BPM
MCH RBC QN AUTO: 33.8 PG (ref 26.6–33)
MCHC RBC AUTO-ENTMCNC: 30 G/DL (ref 31.5–35.7)
MCV RBC AUTO: 112.6 FL (ref 79–97)
MONOCYTES # BLD AUTO: 0.59 10*3/MM3 (ref 0.1–0.9)
MONOCYTES NFR BLD AUTO: 10.5 % (ref 5–12)
NEUTROPHILS NFR BLD AUTO: 2.5 10*3/MM3 (ref 1.7–7)
NEUTROPHILS NFR BLD AUTO: 44.3 % (ref 42.7–76)
PLATELET # BLD AUTO: 210 10*3/MM3 (ref 140–450)
PMV BLD AUTO: 10.4 FL (ref 6–12)
POTASSIUM SERPL-SCNC: 4.4 MMOL/L (ref 3.5–5.2)
PROT SERPL-MCNC: 6.7 G/DL (ref 6–8.5)
RBC # BLD AUTO: 2.78 10*6/MM3 (ref 3.77–5.28)
SODIUM SERPL-SCNC: 141 MMOL/L (ref 136–145)
STRESS TARGET HR: 111 BPM
TOAL ENROLLMENT DAYS: 30
WBC # BLD AUTO: 5.64 10*3/MM3 (ref 3.4–10.8)

## 2021-01-01 PROCEDURE — 93306 TTE W/DOPPLER COMPLETE: CPT | Performed by: INTERNAL MEDICINE

## 2021-01-01 PROCEDURE — 99214 OFFICE O/P EST MOD 30 MIN: CPT | Performed by: INTERNAL MEDICINE

## 2021-01-01 PROCEDURE — 36415 COLL VENOUS BLD VENIPUNCTURE: CPT | Performed by: INTERNAL MEDICINE

## 2021-01-01 PROCEDURE — 85025 COMPLETE CBC W/AUTO DIFF WBC: CPT | Performed by: INTERNAL MEDICINE

## 2021-01-01 PROCEDURE — 70553 MRI BRAIN STEM W/O & W/DYE: CPT

## 2021-01-01 PROCEDURE — 93228 REMOTE 30 DAY ECG REV/REPORT: CPT | Performed by: INTERNAL MEDICINE

## 2021-01-01 PROCEDURE — 93000 ELECTROCARDIOGRAM COMPLETE: CPT | Performed by: INTERNAL MEDICINE

## 2021-01-01 PROCEDURE — 80053 COMPREHEN METABOLIC PANEL: CPT | Performed by: INTERNAL MEDICINE

## 2021-01-01 PROCEDURE — 25010000002 GADOTERIDOL PER 1 ML: Performed by: INTERNAL MEDICINE

## 2021-01-01 PROCEDURE — A9579 GAD-BASE MR CONTRAST NOS,1ML: HCPCS | Performed by: INTERNAL MEDICINE

## 2021-01-01 PROCEDURE — 93306 TTE W/DOPPLER COMPLETE: CPT

## 2021-01-01 RX ADMIN — GADOTERIDOL 10 ML: 279.3 INJECTION, SOLUTION INTRAVENOUS at 08:00

## 2021-02-02 ENCOUNTER — TELEPHONE (OUTPATIENT)
Dept: FAMILY MEDICINE CLINIC | Facility: CLINIC | Age: 86
End: 2021-02-02

## 2021-02-02 NOTE — TELEPHONE ENCOUNTER
Called and spoke to [facility] about getting medicare wellness scheduled. They declined scheduling at this time.

## 2021-02-18 NOTE — PROGRESS NOTES
Hematology-Oncology Follow-up Note     Name: Devon Crane   : 1932   MRN;[id number]    Primary Care Physician: Yelitza Mcdaniel MD  Referring Physician: Yelitza Mcdaniel MD    Reason For Visit:  Chief Complaint   Patient presents with   • Appointment     MDS (myelodysplastic syndrome) (CMS/HCC)   • Follow-up   Myelodysplastic syndrome      HPI:   Ms. Crane is an 88 y.o. female who presents to our office on 19 for evaluation of anemia.  She had a CBC on 19 at her PCP, Dr. Mcdaniel’s office that showed a hemoglobin of 9.7.  B12 and folate levels were checked and they came back normal.  Creatinine was 1.01 with gfr 50.  LFTs were normal.  Her hemoglobin on 19, during a recent hospitalization, was 9.1.  TSH was also normal on 18.  B12 was 328 (low-normal) on 18.  Patient was referred to us for further evaluation.    • 18 - WBC 5.7, hemoglobin 9.1, platelet count 184,000, .4.  Creatinine 0.9, BUN 10, albumin 3.2, globulin 2.7, total protein 5.8.    • 18 - B12 320.  Folate 9.5.    • 19 - TSH 1.85.    • 19 - WBC 7.7, hemoglobin 9.7, platelet count 264,000, MCV 98.3.  Creatinine 1.01, BUN 15, albumin 3.9, globulin 3.7, bilirubin 0.5, AST 15, ALT 9, alkaline phosphatase 46.  Absolute retic count 43,950.  Retic count percent is 1.5%.  BNP 63.  • 19 - Chest x-ray - Emphysema.     • 2019 - The patient presents for initial consultation.  She reports fatigue.  She denies any bleeding per rectum.  Stool Hemoccult test was checked at Dr. Mcdaniel’s office this past week and it was negative x3.  She reports vaginal bleeding, about twice a week, during the past few months.  Patient says it is minimal.  She had a hysterectomy in .     • 2019 reticulocyte count 1.7%, , ferritin 180, haptoglobin 208, copper level 104, haptoglobin 208, and saturation 9%, serum iron 20 low, TIBC 223 low    • 19 -- Bone marrow biopsy -hypercellular marrow with  maturing trilineage hematopoiesis, dyserythropoiesis including ringed  fibroblast and mild megakaryocytic atypia.  Marrow cellularity: 60 to 70%; blasts equals 1%; iron storage: present with ring sideroblasts (less than 15%); marrow fibrosis absent; Cytogenetics: Normal female karyotype; FISH: Normal MDS panel; next generation sequencing: Detected genomic alterations-SF3B1 [id number], TET2 p.?, one unclear variant in DNMT3A; No evidence of acute leukemia, metastatic carcinoma, plasma cell neoplasm, or lymphoma.   the morphologic findings, in combination with peripheral blood CBC data are suggestive of myelodysplastic syndrome, most likely MDS with single lineage dysplasia.  Ring sideroblasts are present in less than 15% of erythroid precursors, which does not meet the criteria for MDS-ROS without the status is of SF 3B1 mutation.  IPSS-R score: 2.64, IPSS-R category: Low. Blood: hemoglobin 9.5, platelet count 237,,000, ANC 5.4.   • 7/2/2019 CT chest abdomen pelvis: No acute abdominal or pelvic abnormality.  Extensive lumbar degenerative disc changes.  Multilevel spinal canal and neural foraminal narrowing.  No evidence of active cardiopulmonary disease.  • 7/10/2019 erythropoietin 14.8 normal  • 9/11/2019  WBC 9.2, Hgb 9.4, Platelets 256K,  • 12/11/2019 WBC 6.3, hemoglobin 10.1, platelets 221, .6  • 12/11/2019, iron 80, ferritin 88, iron saturation 26%, TIBC 307  • 2/26/2020 WBC 6.4, hemoglobin 10.1, platelets 221, ,  • 5/26/2020: TSH 4.24, creatinine 1.03, LFTs normal, WBC 6.6, hemoglobin 9.7, , platelets 221,  • 5/26/2020: Patient received Prolia 60 mg.  • 6/17/2020 iron 77, iron saturation 25%, TIBC 305, ferritin 133, WBC 7.07, hemoglobin 10, .2, platelets 205,  • 9/22/2020 urine culture shows E. coli  • 10/21/2020: WBC 6.06, hemoglobin 9.5, platelets 172  • 12/1/2020: Patient received Prolia 60 mg  • 2/24/2021: WBC 6, hemoglobin 9.7, platelets 190, .8    Subjective:       The  patient is here for a scheduled follow up of anemia/MDS .   She is accompanied by her daughter at today's visit. She is not  taking iron. . She takes 81mg ASA daily.    She denies any fatigue or any B symptoms.  Hemoglobin is stable..  Does not report any fatigue. She has been practicing social distancing due to the ongoing coronavirus pandemic.         The following portions of the patient's history were reviewed and updated as appropriate: allergies, current medications, past family history, past medical history, past social history, past surgical history and problem list.     Past Medical History:   Diagnosis Date   • Anemia    • CAD (coronary artery disease)    • COPD (chronic obstructive pulmonary disease) (CMS/HCC)    • Dyslipidemia    • GERD (gastroesophageal reflux disease)    • Hearing loss    • Hyperlipidemia    • Hypertension    • MDS (myelodysplastic syndrome) (CMS/HCC)    • Osteoarthritis    • Oxygen dependent    • Paroxysmal A-fib (CMS/HCC)    • Presence of pessary    • Prolapse of female bladder, acquired    • Pulmonary hypertension (CMS/HCC)    • Vaginal bleeding        Past Surgical History:   Procedure Laterality Date   • APPENDECTOMY  05/1947   • BONE MARROW BIOPSY  07/10/2019   • CARDIAC CATHETERIZATION  02/19/2003, 06/22/2004, 01/16/2007, 12/2010, 10/06/2014,05/27/2018   • CHOLECYSTECTOMY  02/06/2007   • CORONARY ARTERY BYPASS GRAFT  2013   • CYSTOCELE REPAIR  05/1990   • EYE LENS IMPLANT SECONDARY  04/2000, 05/2000   • HYSTERECTOMY  04/1963   • KNEE CARTILAGE SURGERY Right 01/10/2001   • SKIN CANCER EXCISION      head and face         Current Outpatient Medications:   •  aspirin 81 MG chewable tablet, Chew 81 mg Every Other Day., Disp: , Rfl:   •  B Complex Vitamins (VITAMIN-B COMPLEX PO), Take  by mouth Daily., Disp: , Rfl:   •  denosumab (Prolia) 60 MG/ML solution prefilled syringe syringe, Inject 60 mg under the skin into the appropriate area as directed 1 (One) Time., Disp: , Rfl:   •   "esomeprazole (nexIUM) 20 MG packet, Take 1 tablet by mouth Daily., Disp: , Rfl:   •  Multiple Vitamins-Minerals (MULTIVITAMIN ADULT PO), Take 1 tablet by mouth Daily., Disp: , Rfl:   •  nitroglycerin (NITROSTAT) 0.4 MG SL tablet, Place 1 tablet under the tongue Every 5 (Five) Minutes As Needed for Chest Pain. Take no more than 3 doses in 15 minutes., Disp: 30 tablet, Rfl: 12  •  Parenteral Electrolytes (NUTRILYTE IV), Take  by mouth Daily., Disp: , Rfl:   •  simvastatin (ZOCOR) 40 MG tablet, Take 1 tablet by mouth Daily., Disp: 90 tablet, Rfl: 3    Allergies   Allergen Reactions   • Diphenhydramine Swelling     Unknown.        Family History   Problem Relation Age of Onset   • Cancer Sister    • Heart disease Sister    • Cancer Brother    • Heart disease Brother    • Diabetes Brother    • Heart disease Mother    • Stroke Mother    • Heart disease Father        Cancer-related family history includes Cancer in her brother and sister.    Social History     Tobacco Use   • Smoking status: Never Smoker   • Smokeless tobacco: Never Used   Substance Use Topics   • Alcohol use: No     Frequency: Never   • Drug use: No       ROS:       Objective:  Vitals:  Vitals:    02/24/21 0913   BP: 142/78   Pulse: 65   Resp: 20   Temp: 97.5 °F (36.4 °C)   Weight: 52.6 kg (116 lb)   Height: 160 cm (63\")   PainSc: 0-No pain     Body mass index is 20.55 kg/m².    Physical Exam:   Physical Exam   Constitutional: She is oriented to person, place, and time. She appears well-developed. No distress.   Frail   HENT:   Head: Normocephalic and atraumatic.   Eyes: Conjunctivae are normal. Right eye exhibits no discharge. Left eye exhibits no discharge. No scleral icterus.   Neck: Normal range of motion. Neck supple. No thyromegaly present.   Cardiovascular: Normal rate, regular rhythm and normal heart sounds. Exam reveals no gallop and no friction rub.   Pulmonary/Chest: Effort normal. No stridor. No respiratory distress. She has no wheezes.   On 2 " liters oxygen via nasal cannula    Abdominal: Soft. Normal appearance and bowel sounds are normal. She exhibits no mass. There is no abdominal tenderness. There is no rebound and no guarding.   Musculoskeletal: Normal range of motion. No tenderness, deformity or signs of injury.      Left lower leg: No edema.   Lymphadenopathy:     She has no cervical adenopathy.   Neurological: She is alert and oriented to person, place, and time. She exhibits normal muscle tone.   Skin: Skin is warm. No rash noted. She is not diaphoretic. No erythema.   Psychiatric: Her behavior is normal. Mood normal.   Nursing note and vitals reviewed.    I have reexamined the patient and the results are consistent with the previously documented exam. Devonte Emanuel MD       Lab Results - Last 18 Months   Lab Units 02/24/21  0833 10/21/20  0848 06/17/20  0946   WBC 10*3/mm3 6.07 6.06 7.07   HEMOGLOBIN g/dL 9.7* 9.5* 10.0*   HEMATOCRIT % 30.3* 28.8* 30.6*   PLATELETS 10*3/mm3 190 172 205   MCV fL 104.8* 101.8* 105.2*     Lab Results - Last 18 Months   Lab Units 05/26/20  1030   SODIUM mmol/L 141   POTASSIUM mmol/L 4.2   CHLORIDE mmol/L 103   TOTAL CO2 mmol/L 23   BUN mg/dL 15   CREATININE mg/dL 1.03*   CALCIUM mg/dL 9.5   BILIRUBIN mg/dL 0.4   ALK PHOS IU/L 40   ALT (SGPT) IU/L 8   AST (SGOT) IU/L 16       Lab Results   Component Value Date    GLUCOSE 106 (H) 07/10/2019    BUN 15 05/26/2020    CREATININE 1.03 (H) 05/26/2020    EGFRIFNONA 49 (L) 05/26/2020    EGFRIFAFRI 56 (L) 05/26/2020    BCR 15 05/26/2020    K 4.2 05/26/2020    CO2 23 05/26/2020    CALCIUM 9.5 05/26/2020    PROTENTOTREF 7.3 05/26/2020    ALBUMIN 4.0 05/26/2020    LABIL2 1.2 05/26/2020    AST 16 05/26/2020    ALT 8 05/26/2020     Lab Results   Component Value Date    IRON 77 06/17/2020    TIBC 305 06/17/2020    FERRITIN 133.30 06/17/2020     Lab Results   Component Value Date    FOLATE 7.6 01/21/2019     Lab Results   Component Value Date    OCCULTBLD Negative 05/29/2019     OCCULTBLD Negative 11/19/2018     No results found for: RETICCTPCT  Lab Results   Component Value Date    FMMGFULF16 521 01/21/2019     No results found for: SPEP, UPEP  No results found for: LDH, URICACID  No results found for: ESTER, RF, SEDRATE  No results found for: FIBRINOGEN, HAPTOGLOBIN  Lab Results   Component Value Date    PTT 25.3 05/27/2018    INR 1.0 05/27/2018     No results found for:   No results found for: CEA  No components found for: CA-19-9  No results found for: PSA  Assessment/Plan   Assessment:    1. MDS.  Patient presented with chronic macrocytic anemia.  Bone marrow biopsy performed on 6/19/19 suggestive of myelodysplastic syndrome,with single lineage dysplasia with genomic alterations :SF3B1 [id number], TET2 p., one unclear variant in DNMT3A.. IPSS-R score: 2.64, IPSS-R category: Low. .  Her clinical course seems to be very favorable, and so far she does not need any treatment.  Will need CBC monitoring to evaluate for progression to AML.  So for hemoglobin stable, WBC and platelets remain normal.  2. Anemia:  Macrocytosis.  B12 was low-normal and folate was normal. BM BX shows low grade MDS. Borderline CKD, with creatinine of 1.01 and GFR <60 may also be contributing. She reports vaginal bleeding/spotting.  Bleeding may also be contributing.  Her iron levels are low normal.  3. Osteoporosis: Patient on Prolia.  Recommend vitamin D plus calcium supplement.  4. Recent E. coli UTI.      PLAN:    1. Hemoglobin stable, but platelets and white count are stable.   No plans to start her on Procrit..  She does have symptoms of fatigue but that could be from other causes.  2. Discussed the potential risk of MDS progressing because of severe anemia and cytopenias.  3. Continue Prolia for osteoporosis.  4. Patient has stopped taking iron.  Iron levels are stable.  5. RTC in 4 months      I have reviewed and confirmed the accuracy of the patient's history: Chief complaint, HPI, ROS and Subjective as  entered by the MA/LPN/RN. Devonte Emanuel MD 02/24/21       Orders Placed This Encounter   Procedures   • CBC & Differential     Standing Status:   Future     Number of Occurrences:   1          Electronically signed by Devonte Emanuel MD, 02/24/21, 9:34 AM EST.

## 2021-02-24 ENCOUNTER — OFFICE VISIT (OUTPATIENT)
Dept: ONCOLOGY | Facility: CLINIC | Age: 86
End: 2021-02-24

## 2021-02-24 ENCOUNTER — LAB (OUTPATIENT)
Dept: LAB | Facility: HOSPITAL | Age: 86
End: 2021-02-24

## 2021-02-24 VITALS
BODY MASS INDEX: 20.55 KG/M2 | HEIGHT: 63 IN | RESPIRATION RATE: 20 BRPM | SYSTOLIC BLOOD PRESSURE: 142 MMHG | TEMPERATURE: 97.5 F | DIASTOLIC BLOOD PRESSURE: 78 MMHG | HEART RATE: 65 BPM | WEIGHT: 116 LBS

## 2021-02-24 DIAGNOSIS — D46.9 MDS (MYELODYSPLASTIC SYNDROME) (HCC): ICD-10-CM

## 2021-02-24 DIAGNOSIS — D46.9 MDS (MYELODYSPLASTIC SYNDROME) (HCC): Primary | ICD-10-CM

## 2021-02-24 LAB
BASOPHILS # BLD AUTO: 0.05 10*3/MM3 (ref 0–0.2)
BASOPHILS NFR BLD AUTO: 0.8 % (ref 0–1.5)
DEPRECATED RDW RBC AUTO: 47.3 FL (ref 37–54)
EOSINOPHIL # BLD AUTO: 0.09 10*3/MM3 (ref 0–0.4)
EOSINOPHIL NFR BLD AUTO: 1.5 % (ref 0.3–6.2)
ERYTHROCYTE [DISTWIDTH] IN BLOOD BY AUTOMATED COUNT: 12.7 % (ref 12.3–15.4)
HCT VFR BLD AUTO: 30.3 % (ref 34–46.6)
HGB BLD-MCNC: 9.7 G/DL (ref 12–15.9)
LYMPHOCYTES # BLD AUTO: 2.21 10*3/MM3 (ref 0.7–3.1)
LYMPHOCYTES NFR BLD AUTO: 36.4 % (ref 19.6–45.3)
MCH RBC QN AUTO: 33.6 PG (ref 26.6–33)
MCHC RBC AUTO-ENTMCNC: 32 G/DL (ref 31.5–35.7)
MCV RBC AUTO: 104.8 FL (ref 79–97)
MONOCYTES # BLD AUTO: 0.52 10*3/MM3 (ref 0.1–0.9)
MONOCYTES NFR BLD AUTO: 8.6 % (ref 5–12)
NEUTROPHILS NFR BLD AUTO: 3.2 10*3/MM3 (ref 1.7–7)
NEUTROPHILS NFR BLD AUTO: 52.7 % (ref 42.7–76)
PLATELET # BLD AUTO: 190 10*3/MM3 (ref 140–450)
PMV BLD AUTO: 9.9 FL (ref 6–12)
RBC # BLD AUTO: 2.89 10*6/MM3 (ref 3.77–5.28)
WBC # BLD AUTO: 6.07 10*3/MM3 (ref 3.4–10.8)

## 2021-02-24 PROCEDURE — 85025 COMPLETE CBC W/AUTO DIFF WBC: CPT

## 2021-02-24 PROCEDURE — 99214 OFFICE O/P EST MOD 30 MIN: CPT | Performed by: INTERNAL MEDICINE

## 2021-02-24 PROCEDURE — 36415 COLL VENOUS BLD VENIPUNCTURE: CPT

## 2021-05-28 ENCOUNTER — TELEPHONE (OUTPATIENT)
Dept: FAMILY MEDICINE CLINIC | Facility: CLINIC | Age: 86
End: 2021-05-28

## 2021-05-28 NOTE — TELEPHONE ENCOUNTER
Caller: [email or web address]    Relationship: Emergency Contact    Best call back number: [phone]    What is the best time to reach you: ANYTIME    Who are you requesting to speak with (clinical staff, provider,  specific staff member): CLINICAL      What was the call regarding: DAUGHTER FABIEN CALLING IN TO STATE THAT HER MOTHER'S INSURANCE WILL COVER HER PROLIA ANYTIME AFTER June 1ST. PLEASE ADVISE FABIEN.    Do you require a callback: YES

## 2021-06-04 ENCOUNTER — CLINICAL SUPPORT (OUTPATIENT)
Dept: FAMILY MEDICINE CLINIC | Facility: CLINIC | Age: 86
End: 2021-06-04

## 2021-06-04 DIAGNOSIS — M81.0 OSTEOPOROSIS, POST-MENOPAUSAL: Primary | ICD-10-CM

## 2021-06-04 PROCEDURE — 96372 THER/PROPH/DIAG INJ SC/IM: CPT | Performed by: FAMILY MEDICINE

## 2021-06-16 ENCOUNTER — LAB (OUTPATIENT)
Dept: LAB | Facility: HOSPITAL | Age: 86
End: 2021-06-16

## 2021-06-16 DIAGNOSIS — D46.9 MDS (MYELODYSPLASTIC SYNDROME) (HCC): ICD-10-CM

## 2021-06-16 LAB
BASOPHILS # BLD AUTO: 0.03 10*3/MM3 (ref 0–0.2)
BASOPHILS NFR BLD AUTO: 0.5 % (ref 0–1.5)
DEPRECATED RDW RBC AUTO: 45 FL (ref 37–54)
EOSINOPHIL # BLD AUTO: 0.16 10*3/MM3 (ref 0–0.4)
EOSINOPHIL NFR BLD AUTO: 2.6 % (ref 0.3–6.2)
ERYTHROCYTE [DISTWIDTH] IN BLOOD BY AUTOMATED COUNT: 12.2 % (ref 12.3–15.4)
FERRITIN SERPL-MCNC: 87.74 NG/ML (ref 13–150)
HCT VFR BLD AUTO: 29.9 % (ref 34–46.6)
HGB BLD-MCNC: 9.7 G/DL (ref 12–15.9)
IRON 24H UR-MRATE: 58 MCG/DL (ref 37–145)
IRON SATN MFR SERPL: 21 % (ref 20–50)
LYMPHOCYTES # BLD AUTO: 2.12 10*3/MM3 (ref 0.7–3.1)
LYMPHOCYTES NFR BLD AUTO: 34.7 % (ref 19.6–45.3)
MCH RBC QN AUTO: 34 PG (ref 26.6–33)
MCHC RBC AUTO-ENTMCNC: 32.4 G/DL (ref 31.5–35.7)
MCV RBC AUTO: 104.9 FL (ref 79–97)
MONOCYTES # BLD AUTO: 0.51 10*3/MM3 (ref 0.1–0.9)
MONOCYTES NFR BLD AUTO: 8.3 % (ref 5–12)
NEUTROPHILS NFR BLD AUTO: 3.29 10*3/MM3 (ref 1.7–7)
NEUTROPHILS NFR BLD AUTO: 53.9 % (ref 42.7–76)
PLATELET # BLD AUTO: 198 10*3/MM3 (ref 140–450)
PMV BLD AUTO: 9.8 FL (ref 6–12)
RBC # BLD AUTO: 2.85 10*6/MM3 (ref 3.77–5.28)
TIBC SERPL-MCNC: 279 MCG/DL (ref 298–536)
TRANSFERRIN SERPL-MCNC: 187 MG/DL (ref 200–360)
WBC # BLD AUTO: 6.11 10*3/MM3 (ref 3.4–10.8)

## 2021-06-16 PROCEDURE — 83540 ASSAY OF IRON: CPT

## 2021-06-16 PROCEDURE — 82728 ASSAY OF FERRITIN: CPT

## 2021-06-16 PROCEDURE — 36415 COLL VENOUS BLD VENIPUNCTURE: CPT

## 2021-06-16 PROCEDURE — 84466 ASSAY OF TRANSFERRIN: CPT

## 2021-06-16 PROCEDURE — 85025 COMPLETE CBC W/AUTO DIFF WBC: CPT

## 2021-06-21 NOTE — PROGRESS NOTES
Hematology-Oncology Follow-up Note     Name: Devon Crane   : 1932   MRN;[id number]    Primary Care Physician: Yelitza Mcdaniel MD  Referring Physician: Yelitza Mcdaniel MD    Reason For Visit:  Chief Complaint   Patient presents with   • Appointment     MDS   • Follow-up   Myelodysplastic syndrome      HPI:   Ms. Crane is an 88 y.o. female who presents to our office on 19 for evaluation of anemia.  She had a CBC on 19 at her PCP, Dr. Mcdaniel’s office that showed a hemoglobin of 9.7.  B12 and folate levels were checked and they came back normal.  Creatinine was 1.01 with gfr 50.  LFTs were normal.  Her hemoglobin on 19, during a recent hospitalization, was 9.1.  TSH was also normal on 18.  B12 was 328 (low-normal) on 18.  Patient was referred to us for further evaluation.    • 18 - WBC 5.7, hemoglobin 9.1, platelet count 184,000, .4.  Creatinine 0.9, BUN 10, albumin 3.2, globulin 2.7, total protein 5.8.    • 18 - B12 320.  Folate 9.5.    • 19 - TSH 1.85.    • 19 - WBC 7.7, hemoglobin 9.7, platelet count 264,000, MCV 98.3.  Creatinine 1.01, BUN 15, albumin 3.9, globulin 3.7, bilirubin 0.5, AST 15, ALT 9, alkaline phosphatase 46.  Absolute retic count 43,950.  Retic count percent is 1.5%.  BNP 63.  • 19 - Chest x-ray - Emphysema.     • 2019 - The patient presents for initial consultation.  She reports fatigue.  She denies any bleeding per rectum.  Stool Hemoccult test was checked at Dr. Mcdaniel’s office this past week and it was negative x3.  She reports vaginal bleeding, about twice a week, during the past few months.  Patient says it is minimal.  She had a hysterectomy in .     • 2019 reticulocyte count 1.7%, , ferritin 180, haptoglobin 208, copper level 104, haptoglobin 208, and saturation 9%, serum iron 20 low, TIBC 223 low    • 19 -- Bone marrow biopsy -hypercellular marrow with maturing trilineage hematopoiesis,  dyserythropoiesis including ringed  fibroblast and mild megakaryocytic atypia.  Marrow cellularity: 60 to 70%; blasts equals 1%; iron storage: present with ring sideroblasts (less than 15%); marrow fibrosis absent; Cytogenetics: Normal female karyotype; FISH: Normal MDS panel; next generation sequencing: Detected genomic alterations-SF3B1 [id number], TET2 p.?, one unclear variant in DNMT3A; No evidence of acute leukemia, metastatic carcinoma, plasma cell neoplasm, or lymphoma.   the morphologic findings, in combination with peripheral blood CBC data are suggestive of myelodysplastic syndrome, most likely MDS with single lineage dysplasia.  Ring sideroblasts are present in less than 15% of erythroid precursors, which does not meet the criteria for MDS-ROS without the status is of SF 3B1 mutation.  IPSS-R score: 2.64, IPSS-R category: Low. Blood: hemoglobin 9.5, platelet count 237,,000, ANC 5.4.   • 7/2/2019 CT chest abdomen pelvis: No acute abdominal or pelvic abnormality.  Extensive lumbar degenerative disc changes.  Multilevel spinal canal and neural foraminal narrowing.  No evidence of active cardiopulmonary disease.  • 7/10/2019 erythropoietin 14.8 normal  • 9/11/2019  WBC 9.2, Hgb 9.4, Platelets 256K,  • 12/11/2019 WBC 6.3, hemoglobin 10.1, platelets 221, .6  • 12/11/2019, iron 80, ferritin 88, iron saturation 26%, TIBC 307  • 2/26/2020 WBC 6.4, hemoglobin 10.1, platelets 221, ,  • 5/26/2020: TSH 4.24, creatinine 1.03, LFTs normal, WBC 6.6, hemoglobin 9.7, , platelets 221,  • 5/26/2020: Patient received Prolia 60 mg.  • 6/17/2020 iron 77, iron saturation 25%, TIBC 305, ferritin 133, WBC 7.07, hemoglobin 10, .2, platelets 205,  • 9/22/2020 urine culture shows E. coli  • 10/21/2020: WBC 6.06, hemoglobin 9.5, platelets 172  • 12/1/2020: Patient received Prolia 60 mg  • 2/24/2021: WBC 6, hemoglobin 9.7, platelets 190, .8  • 6/4/2021 patient received Prolia  • 6/16/2021: WBC 6.1,  hemoglobin 9.7, platelets 198, , ferritin 87.7, iron 58, iron saturation 21%, TIBC 279,      Subjective:     Patient has somewhat physically deteriorated since the last visit.  The patient is here for a scheduled follow up of anemia/MDS .  CBC looks stable.  She is accompanied by her daughter at today's visit. She is not  taking iron. . She takes 81mg ASA daily.  Also received Prolia recently  She denies any fatigue or any B symptoms.    Does not report any fatigue.        The following portions of the patient's history were reviewed and updated as appropriate: allergies, current medications, past family history, past medical history, past social history, past surgical history and problem list.     Past Medical History:   Diagnosis Date   • Anemia    • CAD (coronary artery disease)    • COPD (chronic obstructive pulmonary disease) (CMS/[facility])    • Dyslipidemia    • GERD (gastroesophageal reflux disease)    • Hearing loss    • Hyperlipidemia    • Hypertension    • MDS (myelodysplastic syndrome) (CMS/[facility])    • Osteoarthritis    • Oxygen dependent    • Paroxysmal A-fib (CMS/HCC)    • Presence of pessary    • Prolapse of female bladder, acquired    • Pulmonary hypertension (CMS/HCC)    • Vaginal bleeding        Past Surgical History:   Procedure Laterality Date   • APPENDECTOMY  05/1947   • BONE MARROW BIOPSY  07/10/2019   • CARDIAC CATHETERIZATION  02/19/2003, 06/22/2004, 01/16/2007, 12/2010, 10/06/2014,05/27/2018   • CHOLECYSTECTOMY  02/06/2007   • CORONARY ARTERY BYPASS GRAFT  2013   • CYSTOCELE REPAIR  05/1990   • EYE LENS IMPLANT SECONDARY  04/2000, 05/2000   • HYSTERECTOMY  04/1963   • KNEE CARTILAGE SURGERY Right 01/10/2001   • SKIN CANCER EXCISION      head and face         Current Outpatient Medications:   •  aspirin 81 MG chewable tablet, Chew 81 mg Every Other Day., Disp: , Rfl:   •  B Complex Vitamins (VITAMIN-B COMPLEX PO), Take  by mouth Daily., Disp: , Rfl:   •  denosumab (Prolia) 60 MG/ML solution  "prefilled syringe syringe, Inject 60 mg under the skin into the appropriate area as directed 1 (One) Time., Disp: , Rfl:   •  esomeprazole (nexIUM) 20 MG packet, Take 1 tablet by mouth Daily., Disp: , Rfl:   •  Multiple Vitamins-Minerals (MULTIVITAMIN ADULT PO), Take 1 tablet by mouth Daily., Disp: , Rfl:   •  nitroglycerin (NITROSTAT) 0.4 MG SL tablet, Place 1 tablet under the tongue Every 5 (Five) Minutes As Needed for Chest Pain. Take no more than 3 doses in 15 minutes., Disp: 30 tablet, Rfl: 12  •  Parenteral Electrolytes (NUTRILYTE IV), Take  by mouth Daily., Disp: , Rfl:   •  simvastatin (ZOCOR) 40 MG tablet, Take 1 tablet by mouth Daily., Disp: 90 tablet, Rfl: 3    Allergies   Allergen Reactions   • Diphenhydramine Swelling     Unknown.        Family History   Problem Relation Age of Onset   • Cancer Sister    • Heart disease Sister    • Cancer Brother    • Heart disease Brother    • Diabetes Brother    • Heart disease Mother    • Stroke Mother    • Heart disease Father        Cancer-related family history includes Cancer in her brother and sister.    Social History     Tobacco Use   • Smoking status: Never Smoker   • Smokeless tobacco: Never Used   Substance Use Topics   • Alcohol use: No   • Drug use: No       ROS:       Objective:  Vitals:  Vitals:    06/23/21 0852   BP: 149/79   Pulse: 61   Resp: 20   Temp: 97.1 °F (36.2 °C)   Weight: 52.2 kg (115 lb)   Height: 160 cm (63\")   PainSc: 0-No pain     Body mass index is 20.37 kg/m².    Physical Exam:   Physical Exam   Constitutional: She is oriented to person, place, and time. She appears well-developed. No distress.   Frail   HENT:   Head: Normocephalic and atraumatic.   Eyes: Conjunctivae are normal. Right eye exhibits no discharge. Left eye exhibits no discharge. No scleral icterus.   Neck: No thyromegaly present.   Cardiovascular: Normal rate, regular rhythm and normal heart sounds. Exam reveals no gallop and no friction rub.   Pulmonary/Chest: Effort " normal. No stridor. No respiratory distress. She has no wheezes.   On 2 liters oxygen via nasal cannula    Abdominal: Soft. Normal appearance and bowel sounds are normal. She exhibits no mass. There is no abdominal tenderness. There is no rebound and no guarding.   Musculoskeletal: Normal range of motion. No tenderness, deformity or signs of injury.      Left lower leg: No edema.   Lymphadenopathy:     She has no cervical adenopathy.   Neurological: She is alert and oriented to person, place, and time. She exhibits normal muscle tone.   Skin: Skin is warm. No rash noted. She is not diaphoretic. No erythema.   Psychiatric: Her behavior is normal. Mood normal.   Nursing note and vitals reviewed.    I have reexamined the patient and the results are consistent with the previously documented exam. Devonte Emanuel MD       Lab Results - Last 18 Months   Lab Units 06/16/21  0825 02/24/21  0833 10/21/20  0848   WBC 10*3/mm3 6.11 6.07 6.06   HEMOGLOBIN g/dL 9.7* 9.7* 9.5*   HEMATOCRIT % 29.9* 30.3* 28.8*   PLATELETS 10*3/mm3 198 190 172   MCV fL 104.9* 104.8* 101.8*     Lab Results - Last 18 Months   Lab Units 05/26/20  1030   SODIUM mmol/L 141   POTASSIUM mmol/L 4.2   CHLORIDE mmol/L 103   TOTAL CO2 mmol/L 23   BUN mg/dL 15   CREATININE mg/dL 1.03*   CALCIUM mg/dL 9.5   BILIRUBIN mg/dL 0.4   ALK PHOS IU/L 40   ALT (SGPT) IU/L 8   AST (SGOT) IU/L 16       Lab Results   Component Value Date    GLUCOSE 106 (H) 07/10/2019    BUN 15 05/26/2020    CREATININE 1.03 (H) 05/26/2020    EGFRIFNONA 49 (L) 05/26/2020    EGFRIFAFRI 56 (L) 05/26/2020    BCR 15 05/26/2020    K 4.2 05/26/2020    CO2 23 05/26/2020    CALCIUM 9.5 05/26/2020    PROTENTOTREF 7.3 05/26/2020    ALBUMIN 4.0 05/26/2020    LABIL2 1.2 05/26/2020    AST 16 05/26/2020    ALT 8 05/26/2020     Lab Results   Component Value Date    IRON 58 06/16/2021    TIBC 279 (L) 06/16/2021    FERRITIN 87.74 06/16/2021     Lab Results   Component Value Date    FOLATE 7.6 01/21/2019     Lab  Results   Component Value Date    OCCULTBLD Negative 05/29/2019    OCCULTBLD Negative 11/19/2018     No results found for: RETICCTPCT  Lab Results   Component Value Date    FJPXYFZD78 521 01/21/2019     No results found for: SPEP, UPEP  No results found for: LDH, URICACID  No results found for: ESTER, RF, SEDRATE  No results found for: FIBRINOGEN, HAPTOGLOBIN  Lab Results   Component Value Date    PTT 25.3 05/27/2018    INR 1.0 05/27/2018     No results found for:   No results found for: CEA  No components found for: CA-19-9  No results found for: PSA  Assessment/Plan   Assessment:    1. MDS.  Patient presented with chronic macrocytic anemia.  Bone marrow biopsy performed on 6/19/19 suggestive of myelodysplastic syndrome,with single lineage dysplasia with genomic alterations :SF3B1 [id number], TET2 p., one unclear variant in DNMT3A.. IPSS-R score: 2.64, IPSS-R category: Low. .  Her clinical course seems to be very favorable, and so far she does not need any treatment.  Will need CBC monitoring to evaluate for progression to AML.  Her counts continue to remain stable  2. Anemia:  Macrocytosis.  B12 was low-normal and folate was normal. BM BX shows low grade MDS. Borderline CKD, with creatinine of 1.01 and GFR <60 may also be contributing. She reports vaginal bleeding/spotting.  Bleeding may also be contributing.  Her iron levels are low normal.  3. Osteoporosis: Patient on Prolia.  Recommend vitamin D plus calcium supplement.  4. Recent E. coli UTI.      PLAN:    1. Hemoglobin remained stable.   No plans to start her on Procrit..  She does have symptoms of fatigue but that could be from other causes.  2. Continue Prolia for osteoporosis.  Recommend vitamin D and calcium supplement, p.o. twice daily  3. Patient has stopped taking iron.  Iron levels remain stable.  4. RTC in 4 months      I have reviewed and confirmed the accuracy of the patient's history: Chief complaint, HPI, ROS and Subjective as entered by the  MA/NOEMI/RN. Devonte Emanuel MD 06/23/21       Orders Placed This Encounter   Procedures   • CBC & Differential     Standing Status:   Future          Electronically signed by Devonte Emanuel MD, 06/23/21, 8:53 AM EDT.

## 2021-06-23 ENCOUNTER — OFFICE VISIT (OUTPATIENT)
Dept: ONCOLOGY | Facility: CLINIC | Age: 86
End: 2021-06-23

## 2021-06-23 ENCOUNTER — APPOINTMENT (OUTPATIENT)
Dept: LAB | Facility: HOSPITAL | Age: 86
End: 2021-06-23

## 2021-06-23 VITALS
WEIGHT: 115 LBS | HEART RATE: 61 BPM | BODY MASS INDEX: 20.38 KG/M2 | TEMPERATURE: 97.1 F | DIASTOLIC BLOOD PRESSURE: 79 MMHG | RESPIRATION RATE: 20 BRPM | HEIGHT: 63 IN | SYSTOLIC BLOOD PRESSURE: 149 MMHG

## 2021-06-23 DIAGNOSIS — D46.9 MDS (MYELODYSPLASTIC SYNDROME) (HCC): Primary | ICD-10-CM

## 2021-06-23 PROCEDURE — 99214 OFFICE O/P EST MOD 30 MIN: CPT | Performed by: INTERNAL MEDICINE

## 2021-08-26 NOTE — PROGRESS NOTES
Date of Office Visit: 2021  Encounter Provider: Dr. Cristopher Oliver  Place of Service: [facility] CARDIOLOGY [place]  Patient Name: Devon Crane  :1932  Yelitza Mcdaniel MD    Chief Complaint   Patient presents with   • Coronary Artery Disease     1 year follow up   • Atrial Fibrillation   • Hypertension   • Hyperlipidemia     History of Present Illness    I am pleased to see Mrs. Crane in my office today as a follow-up    As you know, patient is 89 years old white female whose past medical history is significant for advanced COPD, home oxygen, CAD, CABG, who came today  for follow-up    In , patient underwent CABG x1 with LIMA to LAD.  Patient did well.  Left ventricular function was preserved.  In May 2018, patient was admitted at [facility] and underwent stress test which was abnormal.  Subsequent cardiac catheterization showed normal coronary arteries and LIMA to LAD is patent.  Echocardiogram showed EF of 70% and tricuspid regurgitation was noted with pulmonary artery pressure was 47 mm of mercury.    This is a yearly follow-up for the patient.  Patient overall is doing well.  Patient is started on her oxygen.  Patient had myelodysplastic syndrome and was being considered for chemotherapy initially but now it is on hold.  Patient is short of breath.  Patient is very frail and weak.  Patient denies any orthopnea PND no syncope or presyncope.  No leg edema noted.    EKG showed sinus rhythm with isolated PACs right bundle branch is noted.  Rightward axis noted    At present patient is stable from cardiac standpoint.  I would not change any regimen.  Her blood pressure is high but I would not recommend aggressive blood pressure control because of risk of fall.  She has baseline disequilibrium.        Past Medical History:   Diagnosis Date   • Anemia    • CAD (coronary artery disease)    • COPD (chronic obstructive pulmonary disease) (CMS/[facility])    • Dyslipidemia    • GERD  (gastroesophageal reflux disease)    • Hearing loss    • Hyperlipidemia    • Hypertension    • MDS (myelodysplastic syndrome) (CMS/HCC)    • Osteoarthritis    • Oxygen dependent    • Paroxysmal A-fib (CMS/HCC)    • Presence of pessary    • Prolapse of female bladder, acquired    • Pulmonary hypertension (CMS/HCC)    • Vaginal bleeding          Past Surgical History:   Procedure Laterality Date   • APPENDECTOMY  05/1947   • BONE MARROW BIOPSY  07/10/2019   • CARDIAC CATHETERIZATION  02/19/2003, 06/22/2004, 01/16/2007, 12/2010, 10/06/2014,05/27/2018   • CHOLECYSTECTOMY  02/06/2007   • CORONARY ARTERY BYPASS GRAFT  2013   • CYSTOCELE REPAIR  05/1990   • EYE LENS IMPLANT SECONDARY  04/2000, 05/2000   • HYSTERECTOMY  04/1963   • KNEE CARTILAGE SURGERY Right 01/10/2001   • SKIN CANCER EXCISION      head and face           Current Outpatient Medications:   •  aspirin 81 MG chewable tablet, Chew 81 mg Every Other Day., Disp: , Rfl:   •  B Complex Vitamins (VITAMIN-B COMPLEX PO), Take  by mouth Daily., Disp: , Rfl:   •  denosumab (Prolia) 60 MG/ML solution prefilled syringe syringe, Inject 60 mg under the skin into the appropriate area as directed 1 (One) Time., Disp: , Rfl:   •  esomeprazole (nexIUM) 20 MG packet, Take 1 tablet by mouth Daily., Disp: , Rfl:   •  Multiple Vitamins-Minerals (MULTIVITAMIN ADULT PO), Take 1 tablet by mouth Daily., Disp: , Rfl:   •  nitroglycerin (NITROSTAT) 0.4 MG SL tablet, Place 1 tablet under the tongue Every 5 (Five) Minutes As Needed for Chest Pain. Take no more than 3 doses in 15 minutes., Disp: 30 tablet, Rfl: 12  •  Parenteral Electrolytes (NUTRILYTE IV), Take  by mouth Daily., Disp: , Rfl:   •  simvastatin (ZOCOR) 40 MG tablet, Take 1 tablet by mouth Daily., Disp: 90 tablet, Rfl: 3      Social History     Socioeconomic History   • Marital status:      Spouse name: Not on file   • Number of children: Not on file   • Years of education: Not on file   • Highest education level: Not  "on file   Tobacco Use   • Smoking status: Never Smoker   • Smokeless tobacco: Never Used   Vaping Use   • Vaping Use: Never used   Substance and Sexual Activity   • Alcohol use: No   • Drug use: No   • Sexual activity: Defer         Review of Systems   Constitutional: Negative for chills and fever.   HENT: Negative for ear discharge and nosebleeds.    Eyes: Negative for discharge and redness.   Cardiovascular: Negative for chest pain, orthopnea, palpitations, paroxysmal nocturnal dyspnea and syncope.   Respiratory: Positive for shortness of breath. Negative for cough and wheezing.    Endocrine: Negative for heat intolerance.   Skin: Negative for rash.   Musculoskeletal: Positive for arthritis and joint pain. Negative for myalgias.   Gastrointestinal: Negative for abdominal pain, melena, nausea and vomiting.   Genitourinary: Negative for dysuria and hematuria.   Neurological: Negative for dizziness, light-headedness, numbness and tremors.   Psychiatric/Behavioral: Negative for depression. The patient is not nervous/anxious.        Procedures      ECG 12 Lead    Date/Time: 8/27/2021 10:10 AM  Performed by: Cristopher Oliver MD  Authorized by: Cristopher Oliver MD   Comparison: compared with previous ECG   Similar to previous ECG  Rhythm: sinus rhythm  Conduction: right bundle branch block and left posterior fascicular block    Clinical impression: abnormal EKG            ECG 12 Lead    (Results Pending)           Objective:    /71   Pulse 66   Ht 160 cm (62.99\")   Wt 52.2 kg (115 lb)   LMP  (LMP Unknown)   BMI 20.38 kg/m²         Constitutional:       Appearance: Well-developed.   Eyes:      General: No scleral icterus.        Right eye: No discharge.   HENT:      Head: Normocephalic and atraumatic.   Neck:      Thyroid: No thyromegaly.      Lymphadenopathy: No cervical adenopathy.   Pulmonary:      Effort: Pulmonary effort is normal. No respiratory distress.      Breath sounds: No wheezing. Rhonchi present. No " rales.   Cardiovascular:      Normal rate. Regular rhythm.      No gallop.   Abdominal:      Tenderness: There is no abdominal tenderness.   Skin:     Findings: No erythema or rash.   Neurological:      Mental Status: Alert and oriented to person, place, and time.             Assessment:       Diagnosis Plan   1. Coronary artery disease involving autologous vein coronary bypass graft without angina pectoris  ECG 12 Lead   2. Mixed hyperlipidemia  ECG 12 Lead   3. Shortness of breath  ECG 12 Lead   4. Essential hypertension  ECG 12 Lead   5. Paroxysmal atrial fibrillation (CMS/HCC)  ECG 12 Lead            Plan:       MDM:    1.  CAD:    Patient is not reporting any symptom of angina pectoris.  At present continue current treatment    2.  Hypertension:    Patient blood pressure is high but I would not bring the blood pressure too aggressively because of risk of fall.  Continue to observe    3.  Hyperlipidemia:    Patient is on simvastatin

## 2021-08-27 ENCOUNTER — OFFICE VISIT (OUTPATIENT)
Dept: CARDIOLOGY | Facility: CLINIC | Age: 86
End: 2021-08-27

## 2021-08-27 VITALS
BODY MASS INDEX: 20.38 KG/M2 | HEIGHT: 63 IN | SYSTOLIC BLOOD PRESSURE: 150 MMHG | HEART RATE: 66 BPM | DIASTOLIC BLOOD PRESSURE: 71 MMHG | WEIGHT: 115 LBS

## 2021-08-27 DIAGNOSIS — I48.0 PAROXYSMAL ATRIAL FIBRILLATION (HCC): ICD-10-CM

## 2021-08-27 DIAGNOSIS — R06.02 SHORTNESS OF BREATH: ICD-10-CM

## 2021-08-27 DIAGNOSIS — E78.2 MIXED HYPERLIPIDEMIA: ICD-10-CM

## 2021-08-27 DIAGNOSIS — I25.810 CORONARY ARTERY DISEASE INVOLVING AUTOLOGOUS VEIN CORONARY BYPASS GRAFT WITHOUT ANGINA PECTORIS: Primary | ICD-10-CM

## 2021-08-27 DIAGNOSIS — I10 ESSENTIAL HYPERTENSION: ICD-10-CM

## 2021-08-27 PROCEDURE — 93000 ELECTROCARDIOGRAM COMPLETE: CPT | Performed by: INTERNAL MEDICINE

## 2021-08-27 PROCEDURE — 99213 OFFICE O/P EST LOW 20 MIN: CPT | Performed by: INTERNAL MEDICINE

## 2021-09-13 DIAGNOSIS — E78.2 MIXED HYPERLIPIDEMIA: Primary | ICD-10-CM

## 2021-09-14 RX ORDER — SIMVASTATIN 40 MG
40 TABLET ORAL DAILY
Qty: 90 TABLET | Refills: 3 | Status: SHIPPED | OUTPATIENT
Start: 2021-09-14 | End: 2022-01-01

## 2021-10-21 NOTE — PROGRESS NOTES
Hematology-Oncology Follow-up Note     Name: Devon Crane   : 1932   MRN;[id number]    Primary Care Physician: Yelitza Mcdaniel MD  Referring Physician: Yelitza Mcdaniel MD    Reason For Visit:  Chief Complaint   Patient presents with   • Follow-up     MDS (myelodysplastic syndrome)   Myelodysplastic syndrome      HPI:   Ms. Crane is an 89 y.o. female who presents to our office on 19 for evaluation of anemia.  She had a CBC on 19 at her PCP, Dr. Mcdaniel’s office that showed a hemoglobin of 9.7.  B12 and folate levels were checked and they came back normal.  Creatinine was 1.01 with gfr 50.  LFTs were normal.  Her hemoglobin on 19, during a recent hospitalization, was 9.1.  TSH was also normal on 18.  B12 was 328 (low-normal) on 18.  Patient was referred to us for further evaluation.    • 18 - WBC 5.7, hemoglobin 9.1, platelet count 184,000, .4.  Creatinine 0.9, BUN 10, albumin 3.2, globulin 2.7, total protein 5.8.    • 18 - B12 320.  Folate 9.5.    • 19 - TSH 1.85.    • 19 - WBC 7.7, hemoglobin 9.7, platelet count 264,000, MCV 98.3.  Creatinine 1.01, BUN 15, albumin 3.9, globulin 3.7, bilirubin 0.5, AST 15, ALT 9, alkaline phosphatase 46.  Absolute retic count 43,950.  Retic count percent is 1.5%.  BNP 63.  • 19 - Chest x-ray - Emphysema.     • 2019 - The patient presents for initial consultation.  She reports fatigue.  She denies any bleeding per rectum.  Stool Hemoccult test was checked at Dr. Mcdaniel’s office this past week and it was negative x3.  She reports vaginal bleeding, about twice a week, during the past few months.  Patient says it is minimal.  She had a hysterectomy in .     • 2019 reticulocyte count 1.7%, , ferritin 180, haptoglobin 208, copper level 104, haptoglobin 208, and saturation 9%, serum iron 20 low, TIBC 223 low    • 19 -- Bone marrow biopsy -hypercellular marrow with maturing trilineage  hematopoiesis, dyserythropoiesis including ringed  fibroblast and mild megakaryocytic atypia.  Marrow cellularity: 60 to 70%; blasts equals 1%; iron storage: present with ring sideroblasts (less than 15%); marrow fibrosis absent; Cytogenetics: Normal female karyotype; FISH: Normal MDS panel; next generation sequencing: Detected genomic alterations-SF3B1 [id number], TET2 p.?, one unclear variant in DNMT3A; No evidence of acute leukemia, metastatic carcinoma, plasma cell neoplasm, or lymphoma.   the morphologic findings, in combination with peripheral blood CBC data are suggestive of myelodysplastic syndrome, most likely MDS with single lineage dysplasia.  Ring sideroblasts are present in less than 15% of erythroid precursors, which does not meet the criteria for MDS-ROS without the status is of SF 3B1 mutation.  IPSS-R score: 2.64, IPSS-R category: Low. Blood: hemoglobin 9.5, platelet count 237,,000, ANC 5.4.   • 7/2/2019 CT chest abdomen pelvis: No acute abdominal or pelvic abnormality.  Extensive lumbar degenerative disc changes.  Multilevel spinal canal and neural foraminal narrowing.  No evidence of active cardiopulmonary disease.  • 7/10/2019 erythropoietin 14.8 normal  • 9/11/2019  WBC 9.2, Hgb 9.4, Platelets 256K,  • 12/11/2019 WBC 6.3, hemoglobin 10.1, platelets 221, .6  • 12/11/2019, iron 80, ferritin 88, iron saturation 26%, TIBC 307  • 2/26/2020 WBC 6.4, hemoglobin 10.1, platelets 221, ,  • 5/26/2020: TSH 4.24, creatinine 1.03, LFTs normal, WBC 6.6, hemoglobin 9.7, , platelets 221,  • 5/26/2020: Patient received Prolia 60 mg.  • 6/17/2020 iron 77, iron saturation 25%, TIBC 305, ferritin 133, WBC 7.07, hemoglobin 10, .2, platelets 205,  • 9/22/2020 urine culture shows E. coli  • 10/21/2020: WBC 6.06, hemoglobin 9.5, platelets 172  • 12/1/2020: Patient received Prolia 60 mg  • 2/24/2021: WBC 6, hemoglobin 9.7, platelets 190, .8  • 6/4/2021 patient received  Prolia  • 6/16/2021: WBC 6.1, hemoglobin 9.7, platelets 198, , ferritin 87.7, iron 58, iron saturation 21%, TIBC 279,      Subjective:     Patient has somewhat physically deteriorated since the last visit.  The patient is here for a scheduled follow up of anemia/MDS .  CBC looks stable.  She is accompanied by her daughter at today's visit. She is not  taking iron. . She takes 81mg ASA daily.  Also received Prolia recently  She denies any fatigue or any B symptoms.    Does not report any fatigue.        The following portions of the patient's history were reviewed and updated as appropriate: allergies, current medications, past family history, past medical history, past social history, past surgical history and problem list.     Past Medical History:   Diagnosis Date   • Anemia    • CAD (coronary artery disease)    • COPD (chronic obstructive pulmonary disease) ([facility])    • Dyslipidemia    • GERD (gastroesophageal reflux disease)    • Hearing loss    • Hyperlipidemia    • Hypertension    • MDS (myelodysplastic syndrome) ([facility])    • Osteoarthritis    • Oxygen dependent    • Paroxysmal A-fib ([facility])    • Presence of pessary    • Prolapse of female bladder, acquired    • Pulmonary hypertension ([facility])    • Vaginal bleeding        Past Surgical History:   Procedure Laterality Date   • APPENDECTOMY  05/1947   • BONE MARROW BIOPSY  07/10/2019   • CARDIAC CATHETERIZATION  02/19/2003, 06/22/2004, 01/16/2007, 12/2010, 10/06/2014,05/27/2018   • CHOLECYSTECTOMY  02/06/2007   • CORONARY ARTERY BYPASS GRAFT  2013   • CYSTOCELE REPAIR  05/1990   • EYE LENS IMPLANT SECONDARY  04/2000, 05/2000   • HYSTERECTOMY  04/1963   • KNEE CARTILAGE SURGERY Right 01/10/2001   • SKIN CANCER EXCISION      head and face         Current Outpatient Medications:   •  aspirin 81 MG chewable tablet, Chew 81 mg Every Other Day., Disp: , Rfl:   •  B Complex Vitamins (VITAMIN-B COMPLEX PO), Take  by mouth Daily., Disp: , Rfl:   •  denosumab (Prolia) 60  "MG/ML solution prefilled syringe syringe, Inject 60 mg under the skin into the appropriate area as directed 1 (One) Time., Disp: , Rfl:   •  esomeprazole (nexIUM) 20 MG packet, Take 1 tablet by mouth Daily., Disp: , Rfl:   •  Multiple Vitamins-Minerals (MULTIVITAMIN ADULT PO), Take 1 tablet by mouth Daily., Disp: , Rfl:   •  nitroglycerin (NITROSTAT) 0.4 MG SL tablet, Place 1 tablet under the tongue Every 5 (Five) Minutes As Needed for Chest Pain. Take no more than 3 doses in 15 minutes., Disp: 30 tablet, Rfl: 12  •  Parenteral Electrolytes (NUTRILYTE IV), Take  by mouth Daily., Disp: , Rfl:   •  simvastatin (ZOCOR) 40 MG tablet, Take 1 tablet by mouth Daily., Disp: 90 tablet, Rfl: 3    Allergies   Allergen Reactions   • Diphenhydramine Swelling     Unknown.        Family History   Problem Relation Age of Onset   • Cancer Sister    • Heart disease Sister    • Cancer Brother    • Heart disease Brother    • Diabetes Brother    • Heart disease Mother    • Stroke Mother    • Heart disease Father        Cancer-related family history includes Cancer in her brother and sister.    Social History     Tobacco Use   • Smoking status: Never Smoker   • Smokeless tobacco: Never Used   Vaping Use   • Vaping Use: Never used   Substance Use Topics   • Alcohol use: No   • Drug use: No       ROS:       Objective:  Vitals:  Vitals:    10/25/21 0955   BP: 138/82   Pulse: 85   Resp: 18   Temp: 97.3 °F (36.3 °C)   SpO2: 98%   Weight: 53.5 kg (118 lb)   Height: 160 cm (63\")   PainSc: 0-No pain     Body mass index is 20.9 kg/m².    Physical Exam:   Physical Exam   Constitutional: She is oriented to person, place, and time. She appears well-developed. No distress.   Frail   HENT:   Head: Normocephalic and atraumatic.   Eyes: Conjunctivae are normal. Right eye exhibits no discharge. Left eye exhibits no discharge. No scleral icterus.   Neck: No thyromegaly present.   Cardiovascular: Normal rate, regular rhythm and normal heart sounds. Exam " reveals no gallop and no friction rub.   Pulmonary/Chest: Effort normal. No stridor. No respiratory distress. She has no wheezes.   On 2 liters oxygen via nasal cannula    Abdominal: Soft. Normal appearance and bowel sounds are normal. She exhibits no mass. There is no abdominal tenderness. There is no rebound and no guarding.   Musculoskeletal: Normal range of motion. No tenderness, deformity or signs of injury.      Left lower leg: No edema.   Lymphadenopathy:     She has no cervical adenopathy.   Neurological: She is alert and oriented to person, place, and time. She exhibits normal muscle tone.   Skin: Skin is warm. No rash noted. She is not diaphoretic. No erythema.   Psychiatric: Her behavior is normal. Mood normal.   Vitals reviewed.    I have reexamined the patient and the results are consistent with the previously documented exam. Marco Yap MD       Lab Results - Last 18 Months   Lab Units 10/25/21  0950 06/16/21  0825 02/24/21  0833   WBC 10*3/mm3 5.64 6.11 6.07   HEMOGLOBIN g/dL 9.4* 9.7* 9.7*   HEMATOCRIT % 31.3* 29.9* 30.3*   PLATELETS 10*3/mm3 210 198 190   MCV fL 112.6* 104.9* 104.8*     Lab Results - Last 18 Months   Lab Units 05/26/20  1030   SODIUM mmol/L 141   POTASSIUM mmol/L 4.2   CHLORIDE mmol/L 103   TOTAL CO2 mmol/L 23   BUN mg/dL 15   CREATININE mg/dL 1.03*   CALCIUM mg/dL 9.5   BILIRUBIN mg/dL 0.4   ALK PHOS IU/L 40   ALT (SGPT) IU/L 8   AST (SGOT) IU/L 16       Lab Results   Component Value Date    GLUCOSE 106 (H) 07/10/2019    BUN 15 05/26/2020    CREATININE 1.03 (H) 05/26/2020    EGFRIFNONA 49 (L) 05/26/2020    EGFRIFAFRI 56 (L) 05/26/2020    BCR 15 05/26/2020    K 4.2 05/26/2020    CO2 23 05/26/2020    CALCIUM 9.5 05/26/2020    PROTENTOTREF 7.3 05/26/2020    ALBUMIN 4.0 05/26/2020    LABIL2 1.2 05/26/2020    AST 16 05/26/2020    ALT 8 05/26/2020     Lab Results   Component Value Date    IRON 58 06/16/2021    TIBC 279 (L) 06/16/2021    FERRITIN 87.74 06/16/2021     Lab Results    Component Value Date    FOLATE 7.6 01/21/2019     Lab Results   Component Value Date    OCCULTBLD Negative 05/29/2019    OCCULTBLD Negative 11/19/2018     No results found for: RETICCTPCT  Lab Results   Component Value Date    XYVZGIJH53 521 01/21/2019     No results found for: SPEP, UPEP  No results found for: LDH, URICACID  No results found for: ESTER, RF, SEDRATE  No results found for: FIBRINOGEN, HAPTOGLOBIN  Lab Results   Component Value Date    PTT 25.3 05/27/2018    INR 1.0 05/27/2018     No results found for:   No results found for: CEA  No components found for: CA-19-9  No results found for: PSA  Assessment/Plan   Assessment:    1. MDS.  Patient presented with chronic macrocytic anemia.  Bone marrow biopsy performed on 6/19/19 suggestive of myelodysplastic syndrome,with single lineage dysplasia with genomic alterations :SF3B1 [id number], TET2 p., one unclear variant in DNMT3A.. IPSS-R score: 2.64, IPSS-R category: Low. .  Her clinical course seems to be very favorable, and so far she does not need any treatment.  CBC is stable now. Continue to monitor hb.  2. Syncopal event - Patient has had a syncopal event 10/5. will get MRI, no residual deficits. Patient will talk to her cardiologist for cardiac evaluation as well as a cause of this event. She has had a similar episode in 1/2021.   3. Anemia:  Macrocytosis.  B12 was low-normal and folate was normal. BM BX shows low grade MDS. Borderline CKD, with creatinine of 1.01 and GFR <60 may also be contributing. She reports vaginal bleeding/spotting.  Bleeding may also be contributing.  Her iron levels have been normal.  4. Osteoporosis: Patient on Prolia.  Recommend vitamin D plus calcium supplement.      PLAN:    1. Hemoglobin remained stable.   No plans to start her on Procrit..  She does have symptoms of fatigue   2. Continue Prolia for osteoporosis.  Recommend vitamin D and calcium supplement  3. MRI brain/ cardiac evaluation for syncopal  event.  4. Patient has stopped taking iron.  Iron levels remain stable.   5. RTC in 4 months      I have reviewed and confirmed the accuracy of the patient's history: Chief complaint, HPI, ROS and Subjective as entered by the MA/LPN/RN.     Marco Yap MD 10/25/21       Orders Placed This Encounter   Procedures   • CBC Auto Differential   • CBC & Differential

## 2021-11-04 PROBLEM — R92.30 INCONCLUSIVE MAMMOGRAM DUE TO DENSE BREASTS: Status: RESOLVED | Noted: 2020-02-26 | Resolved: 2021-01-01

## 2021-11-04 PROBLEM — Z71.89 ENCOUNTER FOR COUNSELING FOR CARE MANAGEMENT OF PATIENT WITH CHRONIC CONDITIONS AND COMPLEX HEALTH NEEDS USING NURSE-BASED MODEL: Status: RESOLVED | Noted: 2020-02-26 | Resolved: 2021-01-01

## 2021-11-04 PROBLEM — R92.2 INCONCLUSIVE MAMMOGRAM DUE TO DENSE BREASTS: Status: RESOLVED | Noted: 2020-02-26 | Resolved: 2021-01-01

## 2021-11-04 PROBLEM — Z12.4 CERVICAL CANCER SCREENING: Status: RESOLVED | Noted: 2020-02-26 | Resolved: 2021-01-01

## 2021-11-04 NOTE — PROGRESS NOTES
Date of Office Visit: 2021  Encounter Provider: Cristopher Oliver MD  Place of Service: [facility] CARDIOLOGY [place]  Patient Name: Devon Crane  :1932  Yelitza Mcdaniel MD    Chief Complaint   Patient presents with   • Syncope     Passed out in October, did not go to ER. Had an MRI through oncology     History of Present Illness      I am pleased to see Mrs. Crane in my office today as a follow-up    As you know, patient is 89 years old white female whose past medical history is significant for advanced COPD, home oxygen, CAD, CABG, who came today  for follow-up    In , patient underwent CABG x1 with LIMA to LAD.  Patient did well.  Left ventricular function was preserved.  In May 2018, patient was admitted at [facility] and underwent stress test which was abnormal.  Subsequent cardiac catheterization showed normal coronary arteries and LIMA to LAD is patent.  Echocardiogram showed EF of 70% and tricuspid regurgitation was noted with pulmonary artery pressure was 47 mm of mercury.    This is unscheduled visit for the patient.  In 2021, patient had an episode of syncope.  Patient fell down at home.  She complained of dizziness and lightheadedness prior to the episode.  She denied any chest pain.  Patient denies any orthopnea or PND.  No leg edema noted.  No palpitation.    EKG showed normal sinus rhythm with first-degree AV block and right bundle branch block.    At this stage I would recommend that patient should proceed with event monitor and echocardiogram.  Patient may need stress test in future.  Patient is advised to monitor the blood pressure at home.  I suspect patient may have intermittent low heart rate or underlying sick sinus syndrome.            Past Medical History:   Diagnosis Date   • Anemia    • CAD (coronary artery disease)    • COPD (chronic obstructive pulmonary disease) (HCC)    • Dyslipidemia    • GERD (gastroesophageal reflux disease)    • Hearing  loss    • Hyperlipidemia    • Hypertension    • MDS (myelodysplastic syndrome) (HCC)    • Osteoarthritis    • Oxygen dependent    • Paroxysmal A-fib (HCC)    • Presence of pessary    • Prolapse of female bladder, acquired    • Pulmonary hypertension (HCC)    • Syncope    • Vaginal bleeding          Past Surgical History:   Procedure Laterality Date   • APPENDECTOMY  05/1947   • BONE MARROW BIOPSY  07/10/2019   • CARDIAC CATHETERIZATION  02/19/2003, 06/22/2004, 01/16/2007, 12/2010, 10/06/2014,05/27/2018   • CHOLECYSTECTOMY  02/06/2007   • CORONARY ARTERY BYPASS GRAFT  2013   • CYSTOCELE REPAIR  05/1990   • EYE LENS IMPLANT SECONDARY  04/2000, 05/2000   • HYSTERECTOMY  04/1963   • KNEE CARTILAGE SURGERY Right 01/10/2001   • SKIN CANCER EXCISION      head and face           Current Outpatient Medications:   •  aspirin 81 MG chewable tablet, Chew 81 mg Every Other Day., Disp: , Rfl:   •  B Complex Vitamins (VITAMIN-B COMPLEX PO), Take  by mouth Daily., Disp: , Rfl:   •  denosumab (Prolia) 60 MG/ML solution prefilled syringe syringe, Inject 60 mg under the skin into the appropriate area as directed 1 (One) Time., Disp: , Rfl:   •  esomeprazole (nexIUM) 20 MG packet, Take 1 tablet by mouth Daily., Disp: , Rfl:   •  Multiple Vitamins-Minerals (MULTIVITAMIN ADULT PO), Take 1 tablet by mouth Daily., Disp: , Rfl:   •  nitroglycerin (NITROSTAT) 0.4 MG SL tablet, Place 1 tablet under the tongue Every 5 (Five) Minutes As Needed for Chest Pain. Take no more than 3 doses in 15 minutes., Disp: 30 tablet, Rfl: 12  •  Parenteral Electrolytes (NUTRILYTE IV), Take  by mouth Daily., Disp: , Rfl:   •  simvastatin (ZOCOR) 40 MG tablet, Take 1 tablet by mouth Daily., Disp: 90 tablet, Rfl: 3      Social History     Socioeconomic History   • Marital status:    Tobacco Use   • Smoking status: Never Smoker   • Smokeless tobacco: Never Used   Vaping Use   • Vaping Use: Never used   Substance and Sexual Activity   • Alcohol use: No   •  "Drug use: No   • Sexual activity: Defer         Review of Systems   Constitutional: Negative for chills and fever.   HENT: Negative for ear discharge and nosebleeds.    Eyes: Negative for discharge and redness.   Cardiovascular: Positive for syncope. Negative for chest pain, orthopnea, palpitations and paroxysmal nocturnal dyspnea.   Respiratory: Positive for shortness of breath. Negative for cough and wheezing.    Endocrine: Negative for heat intolerance.   Skin: Negative for rash.   Musculoskeletal: Negative for arthritis and myalgias.   Gastrointestinal: Negative for abdominal pain, melena, nausea and vomiting.   Genitourinary: Negative for dysuria and hematuria.   Neurological: Negative for dizziness, light-headedness, numbness and tremors.   Psychiatric/Behavioral: Negative for depression. The patient is not nervous/anxious.        Procedures      ECG 12 Lead    Date/Time: 11/4/2021 1:23 PM  Performed by: Cristopher Oliver MD  Authorized by: Cristopher Oliver MD   Comparison: compared with previous ECG   Similar to previous ECG  Rhythm: sinus rhythm  Conduction: right bundle branch block    Clinical impression: abnormal EKG            ECG 12 Lead    (Results Pending)           Objective:    /80   Pulse 72   Ht 160 cm (63\")   Wt 51.7 kg (114 lb)   LMP  (LMP Unknown)   SpO2 99% Comment: on 2 L NC  BMI 20.19 kg/m²         Constitutional:       Appearance: Well-developed.   Eyes:      General: No scleral icterus.        Right eye: No discharge.   HENT:      Head: Normocephalic and atraumatic.   Neck:      Thyroid: No thyromegaly.      Lymphadenopathy: No cervical adenopathy.   Pulmonary:      Effort: Pulmonary effort is normal. No respiratory distress.      Breath sounds: Normal breath sounds. No wheezing. No rales.   Cardiovascular:      Normal rate. Regular rhythm.      No gallop.   Abdominal:      Tenderness: There is no abdominal tenderness.   Skin:     Findings: No erythema or rash.   Neurological:      " Mental Status: Alert and oriented to person, place, and time.             Assessment:       Diagnosis Plan   1. Coronary artery disease involving autologous vein coronary bypass graft without angina pectoris  ECG 12 Lead    Cardiac Event Monitor    Adult Transthoracic Echo Complete W/ Cont if Necessary Per Protocol   2. Paroxysmal atrial fibrillation (HCC)  ECG 12 Lead    Cardiac Event Monitor    Adult Transthoracic Echo Complete W/ Cont if Necessary Per Protocol   3. Pulmonary hypertension (HCC)  ECG 12 Lead    Cardiac Event Monitor    Adult Transthoracic Echo Complete W/ Cont if Necessary Per Protocol   4. Syncope and collapse  ECG 12 Lead    Cardiac Event Monitor    Adult Transthoracic Echo Complete W/ Cont if Necessary Per Protocol            Plan:       MDM:    1.  CAD:    Patient is not reporting any symptom of angina pectoris or chest pain.  Patient may need stress test    2.  Syncope:    I would proceed with echocardiogram and an event monitor.    3.  Hypertension:    Blood pressure is slightly high but I would not recommend aggressive blood pressure control in this patient because of risk of fall    4.  Hyperlipidemia:    Patient is on Zocor.

## 2021-12-27 NOTE — TELEPHONE ENCOUNTER
Patients daughter Mecca calling Dr.candace Sadler called and said her Holter was abnormal  She said to call your office and see if she needed to change any medications  Does she need seen sooner than January

## 2021-12-27 NOTE — TELEPHONE ENCOUNTER
Patients daughter Mecca calling Dr.candace Sadler called and said her Holter was abnormal  She said to call your office and see if she needed to change any medications or does she need seen sooner than January 10, 2022

## 2021-12-28 NOTE — TELEPHONE ENCOUNTER
Pham from Dr Mcdaniel's office called and stated that the patient is in afib and they are wondering if the patient needs to be seen sooner than 01/10/22 and was wondering if the patient needs to be on medications. Pham stated that the patient had a monitor on but she stated it was not a holter monitor.

## 2022-01-01 ENCOUNTER — TELEPHONE (OUTPATIENT)
Dept: FAMILY MEDICINE CLINIC | Facility: CLINIC | Age: 87
End: 2022-01-01

## 2022-01-01 ENCOUNTER — OFFICE VISIT (OUTPATIENT)
Dept: CARDIOLOGY | Facility: CLINIC | Age: 87
End: 2022-01-01

## 2022-01-01 ENCOUNTER — ANESTHESIA (OUTPATIENT)
Dept: PERIOP | Facility: HOSPITAL | Age: 87
End: 2022-01-01

## 2022-01-01 ENCOUNTER — HOSPITAL ENCOUNTER (INPATIENT)
Facility: HOSPITAL | Age: 87
LOS: 5 days | Discharge: NURSING, REHAB OR LONG-TERM CARE | End: 2022-05-19
Attending: EMERGENCY MEDICINE | Admitting: HOSPITALIST

## 2022-01-01 ENCOUNTER — READMISSION MANAGEMENT (OUTPATIENT)
Dept: CALL CENTER | Facility: HOSPITAL | Age: 87
End: 2022-01-01

## 2022-01-01 ENCOUNTER — APPOINTMENT (OUTPATIENT)
Dept: CT IMAGING | Facility: HOSPITAL | Age: 87
End: 2022-01-01

## 2022-01-01 ENCOUNTER — APPOINTMENT (OUTPATIENT)
Dept: LAB | Facility: HOSPITAL | Age: 87
End: 2022-01-01

## 2022-01-01 ENCOUNTER — OFFICE VISIT (OUTPATIENT)
Dept: ONCOLOGY | Facility: CLINIC | Age: 87
End: 2022-01-01

## 2022-01-01 ENCOUNTER — APPOINTMENT (OUTPATIENT)
Dept: GENERAL RADIOLOGY | Facility: HOSPITAL | Age: 87
End: 2022-01-01

## 2022-01-01 ENCOUNTER — OFFICE VISIT (OUTPATIENT)
Dept: FAMILY MEDICINE CLINIC | Facility: CLINIC | Age: 87
End: 2022-01-01

## 2022-01-01 ENCOUNTER — ANESTHESIA EVENT (OUTPATIENT)
Dept: PERIOP | Facility: HOSPITAL | Age: 87
End: 2022-01-01

## 2022-01-01 ENCOUNTER — TRANSITIONAL CARE MANAGEMENT TELEPHONE ENCOUNTER (OUTPATIENT)
Dept: CALL CENTER | Facility: HOSPITAL | Age: 87
End: 2022-01-01

## 2022-01-01 ENCOUNTER — HOSPITAL ENCOUNTER (INPATIENT)
Facility: HOSPITAL | Age: 87
LOS: 1 days | Discharge: HOME | End: 2022-04-16
Attending: HOSPITALIST | Admitting: HOSPITALIST

## 2022-01-01 ENCOUNTER — NURSING HOME (OUTPATIENT)
Dept: FAMILY MEDICINE CLINIC | Facility: CLINIC | Age: 87
End: 2022-01-01

## 2022-01-01 ENCOUNTER — HOSPITAL ENCOUNTER (INPATIENT)
Facility: HOSPITAL | Age: 87
LOS: 3 days | Discharge: NURSING, REHAB OR LONG-TERM CARE | End: 2022-06-13
Attending: EMERGENCY MEDICINE | Admitting: INTERNAL MEDICINE

## 2022-01-01 ENCOUNTER — HOSPITAL ENCOUNTER (INPATIENT)
Facility: HOSPITAL | Age: 87
LOS: 3 days | Discharge: NURSING, REHAB OR LONG-TERM CARE | End: 2022-06-27
Attending: EMERGENCY MEDICINE | Admitting: INTERNAL MEDICINE

## 2022-01-01 ENCOUNTER — HOSPITAL ENCOUNTER (OUTPATIENT)
Dept: GENERAL RADIOLOGY | Facility: HOSPITAL | Age: 87
Discharge: HOME | End: 2022-04-12

## 2022-01-01 ENCOUNTER — CLINICAL SUPPORT (OUTPATIENT)
Dept: FAMILY MEDICINE CLINIC | Facility: CLINIC | Age: 87
End: 2022-01-01

## 2022-01-01 ENCOUNTER — APPOINTMENT (OUTPATIENT)
Dept: CARDIOLOGY | Facility: HOSPITAL | Age: 87
End: 2022-01-01

## 2022-01-01 ENCOUNTER — TELEPHONE (OUTPATIENT)
Dept: ONCOLOGY | Facility: CLINIC | Age: 87
End: 2022-01-01

## 2022-01-01 VITALS
WEIGHT: 106.26 LBS | RESPIRATION RATE: 18 BRPM | OXYGEN SATURATION: 100 % | HEART RATE: 76 BPM | DIASTOLIC BLOOD PRESSURE: 63 MMHG | BODY MASS INDEX: 19.55 KG/M2 | HEIGHT: 62 IN | TEMPERATURE: 97.8 F | SYSTOLIC BLOOD PRESSURE: 111 MMHG

## 2022-01-01 VITALS
BODY MASS INDEX: 18.5 KG/M2 | TEMPERATURE: 97.1 F | OXYGEN SATURATION: 99 % | HEIGHT: 63 IN | SYSTOLIC BLOOD PRESSURE: 114 MMHG | DIASTOLIC BLOOD PRESSURE: 62 MMHG | RESPIRATION RATE: 14 BRPM | WEIGHT: 104.4 LBS | HEART RATE: 85 BPM

## 2022-01-01 VITALS
TEMPERATURE: 97.1 F | RESPIRATION RATE: 18 BRPM | HEIGHT: 63 IN | DIASTOLIC BLOOD PRESSURE: 64 MMHG | SYSTOLIC BLOOD PRESSURE: 101 MMHG | WEIGHT: 114.2 LBS | OXYGEN SATURATION: 92 % | BODY MASS INDEX: 20.23 KG/M2 | HEART RATE: 85 BPM

## 2022-01-01 VITALS
TEMPERATURE: 97.8 F | RESPIRATION RATE: 20 BRPM | DIASTOLIC BLOOD PRESSURE: 66 MMHG | HEIGHT: 63 IN | OXYGEN SATURATION: 96 % | BODY MASS INDEX: 19.07 KG/M2 | WEIGHT: 107.6 LBS | HEART RATE: 120 BPM | SYSTOLIC BLOOD PRESSURE: 124 MMHG

## 2022-01-01 VITALS
SYSTOLIC BLOOD PRESSURE: 130 MMHG | WEIGHT: 114.4 LBS | DIASTOLIC BLOOD PRESSURE: 62 MMHG | OXYGEN SATURATION: 100 % | TEMPERATURE: 98 F | HEART RATE: 71 BPM | HEIGHT: 63 IN | BODY MASS INDEX: 20.27 KG/M2 | RESPIRATION RATE: 17 BRPM

## 2022-01-01 VITALS
OXYGEN SATURATION: 97 % | DIASTOLIC BLOOD PRESSURE: 64 MMHG | SYSTOLIC BLOOD PRESSURE: 126 MMHG | RESPIRATION RATE: 18 BRPM | TEMPERATURE: 98 F | HEART RATE: 70 BPM

## 2022-01-01 VITALS
DIASTOLIC BLOOD PRESSURE: 65 MMHG | BODY MASS INDEX: 18.5 KG/M2 | SYSTOLIC BLOOD PRESSURE: 102 MMHG | TEMPERATURE: 98 F | OXYGEN SATURATION: 100 % | WEIGHT: 100.53 LBS | HEART RATE: 71 BPM | HEIGHT: 62 IN | RESPIRATION RATE: 16 BRPM

## 2022-01-01 VITALS
SYSTOLIC BLOOD PRESSURE: 102 MMHG | HEART RATE: 76 BPM | DIASTOLIC BLOOD PRESSURE: 56 MMHG | HEIGHT: 63 IN | RESPIRATION RATE: 13 BRPM | BODY MASS INDEX: 19.57 KG/M2 | WEIGHT: 110.45 LBS | OXYGEN SATURATION: 100 % | TEMPERATURE: 96.6 F

## 2022-01-01 VITALS
HEART RATE: 64 BPM | WEIGHT: 114 LBS | SYSTOLIC BLOOD PRESSURE: 152 MMHG | DIASTOLIC BLOOD PRESSURE: 88 MMHG | BODY MASS INDEX: 20.2 KG/M2 | HEIGHT: 63 IN

## 2022-01-01 DIAGNOSIS — I25.810 CORONARY ARTERY DISEASE INVOLVING AUTOLOGOUS VEIN CORONARY BYPASS GRAFT WITHOUT ANGINA PECTORIS: ICD-10-CM

## 2022-01-01 DIAGNOSIS — I10 ESSENTIAL HYPERTENSION: ICD-10-CM

## 2022-01-01 DIAGNOSIS — E87.1 ACUTE HYPONATREMIA: Primary | ICD-10-CM

## 2022-01-01 DIAGNOSIS — R31.9 HEMATURIA, UNSPECIFIED TYPE: ICD-10-CM

## 2022-01-01 DIAGNOSIS — I48.0 PAROXYSMAL ATRIAL FIBRILLATION: ICD-10-CM

## 2022-01-01 DIAGNOSIS — R94.6 ABNORMAL RESULTS OF THYROID FUNCTION STUDIES: ICD-10-CM

## 2022-01-01 DIAGNOSIS — J96.11 CHRONIC RESPIRATORY FAILURE WITH HYPOXIA: ICD-10-CM

## 2022-01-01 DIAGNOSIS — E43 SEVERE MALNUTRITION: ICD-10-CM

## 2022-01-01 DIAGNOSIS — R53.1 GENERAL WEAKNESS: Primary | ICD-10-CM

## 2022-01-01 DIAGNOSIS — R53.81 MALAISE AND FATIGUE: ICD-10-CM

## 2022-01-01 DIAGNOSIS — J43.2 CENTRILOBULAR EMPHYSEMA: ICD-10-CM

## 2022-01-01 DIAGNOSIS — R30.0 DYSURIA: ICD-10-CM

## 2022-01-01 DIAGNOSIS — E03.9 ACQUIRED HYPOTHYROIDISM: Primary | ICD-10-CM

## 2022-01-01 DIAGNOSIS — Z87.440 HISTORY OF CYSTITIS: Primary | ICD-10-CM

## 2022-01-01 DIAGNOSIS — E46 PROTEIN-CALORIE MALNUTRITION, UNSPECIFIED SEVERITY: ICD-10-CM

## 2022-01-01 DIAGNOSIS — D46.9 MDS (MYELODYSPLASTIC SYNDROME): ICD-10-CM

## 2022-01-01 DIAGNOSIS — D46.9 MDS (MYELODYSPLASTIC SYNDROME): Primary | ICD-10-CM

## 2022-01-01 DIAGNOSIS — R63.6 UNDERWEIGHT: ICD-10-CM

## 2022-01-01 DIAGNOSIS — D49.9 TUMOR: ICD-10-CM

## 2022-01-01 DIAGNOSIS — R53.83 MALAISE AND FATIGUE: ICD-10-CM

## 2022-01-01 DIAGNOSIS — N30.00 ACUTE CYSTITIS WITHOUT HEMATURIA: Primary | ICD-10-CM

## 2022-01-01 DIAGNOSIS — C67.9 CARCINOMA OF BLADDER: Primary | ICD-10-CM

## 2022-01-01 DIAGNOSIS — N30.90 CYSTITIS: Primary | ICD-10-CM

## 2022-01-01 DIAGNOSIS — E03.9 ACQUIRED HYPOTHYROIDISM: ICD-10-CM

## 2022-01-01 DIAGNOSIS — I25.810 CORONARY ARTERY DISEASE INVOLVING AUTOLOGOUS VEIN CORONARY BYPASS GRAFT WITHOUT ANGINA PECTORIS: Primary | ICD-10-CM

## 2022-01-01 DIAGNOSIS — E78.2 MIXED HYPERLIPIDEMIA: ICD-10-CM

## 2022-01-01 DIAGNOSIS — R79.89 ELEVATED TSH: ICD-10-CM

## 2022-01-01 DIAGNOSIS — H90.3 SENSORINEURAL HEARING LOSS (SNHL) OF BOTH EARS: ICD-10-CM

## 2022-01-01 DIAGNOSIS — K21.9 GASTROESOPHAGEAL REFLUX DISEASE WITHOUT ESOPHAGITIS: ICD-10-CM

## 2022-01-01 DIAGNOSIS — D64.9 ANEMIA, UNSPECIFIED TYPE: ICD-10-CM

## 2022-01-01 DIAGNOSIS — R31.0 GROSS HEMATURIA: Primary | ICD-10-CM

## 2022-01-01 DIAGNOSIS — N30.01 ACUTE CYSTITIS WITH HEMATURIA: ICD-10-CM

## 2022-01-01 DIAGNOSIS — M81.0 OSTEOPOROSIS, UNSPECIFIED OSTEOPOROSIS TYPE, UNSPECIFIED PATHOLOGICAL FRACTURE PRESENCE: ICD-10-CM

## 2022-01-01 DIAGNOSIS — N17.9 AKI (ACUTE KIDNEY INJURY): ICD-10-CM

## 2022-01-01 DIAGNOSIS — I27.20 PULMONARY HYPERTENSION: ICD-10-CM

## 2022-01-01 DIAGNOSIS — H91.93 BILATERAL HEARING LOSS, UNSPECIFIED HEARING LOSS TYPE: ICD-10-CM

## 2022-01-01 DIAGNOSIS — M62.82 NON-TRAUMATIC RHABDOMYOLYSIS: ICD-10-CM

## 2022-01-01 DIAGNOSIS — R35.0 URINARY FREQUENCY: Primary | ICD-10-CM

## 2022-01-01 DIAGNOSIS — M54.6 ACUTE RIGHT-SIDED THORACIC BACK PAIN: ICD-10-CM

## 2022-01-01 LAB
ALBUMIN SERPL-MCNC: 2.8 G/DL (ref 3.5–5.2)
ALBUMIN SERPL-MCNC: 3.1 G/DL (ref 3.5–5.2)
ALBUMIN SERPL-MCNC: 3.2 G/DL (ref 3.5–5.2)
ALBUMIN SERPL-MCNC: 3.3 G/DL (ref 3.5–5.2)
ALBUMIN SERPL-MCNC: 3.5 G/DL (ref 3.5–5.2)
ALBUMIN SERPL-MCNC: 3.5 G/DL (ref 3.5–5.2)
ALBUMIN SERPL-MCNC: 3.8 G/DL (ref 3.5–5.2)
ALBUMIN SERPL-MCNC: 4.2 G/DL (ref 3.6–4.6)
ALBUMIN/GLOB SERPL: 1 G/DL
ALBUMIN/GLOB SERPL: 1.1 G/DL
ALBUMIN/GLOB SERPL: 1.2 G/DL
ALBUMIN/GLOB SERPL: 1.2 G/DL
ALBUMIN/GLOB SERPL: 1.4 G/DL
ALBUMIN/GLOB SERPL: 1.5 {RATIO} (ref 1.2–2.2)
ALP SERPL-CCNC: 50 U/L (ref 39–117)
ALP SERPL-CCNC: 50 U/L (ref 39–117)
ALP SERPL-CCNC: 51 IU/L (ref 44–121)
ALP SERPL-CCNC: 54 U/L (ref 39–117)
ALP SERPL-CCNC: 55 U/L (ref 39–117)
ALP SERPL-CCNC: 56 U/L (ref 39–117)
ALP SERPL-CCNC: 57 U/L (ref 39–117)
ALP SERPL-CCNC: 61 U/L (ref 39–117)
ALT SERPL W P-5'-P-CCNC: 12 U/L (ref 1–33)
ALT SERPL W P-5'-P-CCNC: 7 U/L (ref 1–33)
ALT SERPL W P-5'-P-CCNC: 7 U/L (ref 1–33)
ALT SERPL W P-5'-P-CCNC: 8 U/L (ref 1–33)
ALT SERPL W P-5'-P-CCNC: 9 U/L (ref 1–33)
ALT SERPL W P-5'-P-CCNC: 9 U/L (ref 1–33)
ALT SERPL W P-5'-P-CCNC: <5 U/L (ref 1–33)
ALT SERPL-CCNC: 14 IU/L (ref 0–32)
ANION GAP SERPL CALCULATED.3IONS-SCNC: 10 MMOL/L (ref 5–15)
ANION GAP SERPL CALCULATED.3IONS-SCNC: 11 MMOL/L (ref 5–15)
ANION GAP SERPL CALCULATED.3IONS-SCNC: 12 MMOL/L (ref 5–15)
ANION GAP SERPL CALCULATED.3IONS-SCNC: 12 MMOL/L (ref 5–15)
ANION GAP SERPL CALCULATED.3IONS-SCNC: 13 MMOL/L (ref 5–15)
ANION GAP SERPL CALCULATED.3IONS-SCNC: 15 MMOL/L (ref 5–15)
ANION GAP SERPL CALCULATED.3IONS-SCNC: 8 MMOL/L (ref 5–15)
ANION GAP SERPL CALCULATED.3IONS-SCNC: 8 MMOL/L (ref 5–15)
ANION GAP SERPL CALCULATED.3IONS-SCNC: 9 MMOL/L (ref 5–15)
ANION GAP SERPL CALCULATED.3IONS-SCNC: 9 MMOL/L (ref 5–15)
APTT PPP: 36.5 SECONDS (ref 61–76.5)
APTT PPP: 37.1 SECONDS (ref 61–76.5)
AST SERPL-CCNC: 12 U/L (ref 1–32)
AST SERPL-CCNC: 15 U/L (ref 1–32)
AST SERPL-CCNC: 15 U/L (ref 1–32)
AST SERPL-CCNC: 16 U/L (ref 1–32)
AST SERPL-CCNC: 18 U/L (ref 1–32)
AST SERPL-CCNC: 19 U/L (ref 1–32)
AST SERPL-CCNC: 26 IU/L (ref 0–40)
AST SERPL-CCNC: 32 U/L (ref 1–32)
BACTERIA BLD CULT: ABNORMAL
BACTERIA ISLT: NORMAL
BACTERIA SPEC AEROBE CULT: ABNORMAL
BACTERIA SPEC AEROBE CULT: NO GROWTH
BACTERIA SPEC AEROBE CULT: NORMAL
BACTERIA UR CULT: ABNORMAL
BACTERIA UR QL AUTO: ABNORMAL /HPF
BASOPHILS # BLD AUTO: 0 10*3/MM3 (ref 0–0.2)
BASOPHILS # BLD AUTO: 0 X10E3/UL (ref 0–0.2)
BASOPHILS # BLD AUTO: 0.04 10*3/MM3 (ref 0–0.2)
BASOPHILS # BLD AUTO: 0.1 10*3/MM3 (ref 0–0.2)
BASOPHILS NFR BLD AUTO: 0 %
BASOPHILS NFR BLD AUTO: 0.4 % (ref 0–1.5)
BASOPHILS NFR BLD AUTO: 0.4 % (ref 0–1.5)
BASOPHILS NFR BLD AUTO: 0.6 % (ref 0–1.5)
BASOPHILS NFR BLD AUTO: 0.7 % (ref 0–1.5)
BASOPHILS NFR BLD AUTO: 0.8 % (ref 0–1.5)
BASOPHILS NFR BLD AUTO: 1 % (ref 0–1.5)
BASOPHILS NFR BLD AUTO: 1.1 % (ref 0–1.5)
BASOPHILS NFR BLD AUTO: 1.7 % (ref 0–1.5)
BASOPHILS NFR BLD AUTO: 1.9 % (ref 0–1.5)
BH CV ECHO MEAS - ACS: 1.89 CM
BH CV ECHO MEAS - AO MAX PG: 7.3 MMHG
BH CV ECHO MEAS - AO MEAN PG: 4.3 MMHG
BH CV ECHO MEAS - AO ROOT DIAM: 3.4 CM
BH CV ECHO MEAS - AO V2 MAX: 134.6 CM/SEC
BH CV ECHO MEAS - AO V2 VTI: 29.3 CM
BH CV ECHO MEAS - AVA(I,D): 2.38 CM2
BH CV ECHO MEAS - EDV(CUBED): 67.2 ML
BH CV ECHO MEAS - EDV(MOD-SP4): 45.9 ML
BH CV ECHO MEAS - EF(MOD-BP): 68 %
BH CV ECHO MEAS - EF(MOD-SP4): 68.9 %
BH CV ECHO MEAS - ESV(CUBED): 10.3 ML
BH CV ECHO MEAS - ESV(MOD-SP4): 14.3 ML
BH CV ECHO MEAS - FS: 46.5 %
BH CV ECHO MEAS - IVS/LVPW: 0.77 CM
BH CV ECHO MEAS - IVSD: 0.73 CM
BH CV ECHO MEAS - LA DIMENSION(2D): 2 CM
BH CV ECHO MEAS - LV DIASTOLIC VOL/BSA (35-75): 30.8 CM2
BH CV ECHO MEAS - LV MASS(C)D: 102.1 GRAMS
BH CV ECHO MEAS - LV MAX PG: 3.4 MMHG
BH CV ECHO MEAS - LV MEAN PG: 1.78 MMHG
BH CV ECHO MEAS - LV SYSTOLIC VOL/BSA (12-30): 9.6 CM2
BH CV ECHO MEAS - LV V1 MAX: 91.6 CM/SEC
BH CV ECHO MEAS - LV V1 VTI: 20.7 CM
BH CV ECHO MEAS - LVIDD: 4.1 CM
BH CV ECHO MEAS - LVIDS: 2.17 CM
BH CV ECHO MEAS - LVOT AREA: 3.4 CM2
BH CV ECHO MEAS - LVOT DIAM: 2.07 CM
BH CV ECHO MEAS - LVPWD: 0.95 CM
BH CV ECHO MEAS - MV A MAX VEL: 105.8 CM/SEC
BH CV ECHO MEAS - MV DEC SLOPE: 446.4 CM/SEC2
BH CV ECHO MEAS - MV DEC TIME: 0.24 MSEC
BH CV ECHO MEAS - MV E MAX VEL: 106.4 CM/SEC
BH CV ECHO MEAS - MV E/A: 1.01
BH CV ECHO MEAS - MV MAX PG: 4.8 MMHG
BH CV ECHO MEAS - MV MEAN PG: 1.8 MMHG
BH CV ECHO MEAS - MV V2 VTI: 35.4 CM
BH CV ECHO MEAS - MVA(VTI): 1.97 CM2
BH CV ECHO MEAS - PA ACC TIME: 0.05 SEC
BH CV ECHO MEAS - PA PR(ACCEL): 58.2 MMHG
BH CV ECHO MEAS - PA V2 MAX: 79.4 CM/SEC
BH CV ECHO MEAS - PI END-D VEL: 103.1 CM/SEC
BH CV ECHO MEAS - PULM A REVS DUR: 0.15 SEC
BH CV ECHO MEAS - PULM A REVS VEL: 28.9 CM/SEC
BH CV ECHO MEAS - PULM DIAS VEL: 41.8 CM/SEC
BH CV ECHO MEAS - PULM S/D: 1.23
BH CV ECHO MEAS - PULM SYS VEL: 51.4 CM/SEC
BH CV ECHO MEAS - RV MAX PG: 2.33 MMHG
BH CV ECHO MEAS - RV V1 MAX: 76.3 CM/SEC
BH CV ECHO MEAS - RV V1 VTI: 18.3 CM
BH CV ECHO MEAS - RVDD: 3.3 CM
BH CV ECHO MEAS - SI(MOD-SP4): 21.2 ML/M2
BH CV ECHO MEAS - SV(LVOT): 69.7 ML
BH CV ECHO MEAS - SV(MOD-SP4): 31.6 ML
BH CV ECHO MEAS - TR MAX PG: 36.4 MMHG
BH CV ECHO MEAS - TR MAX VEL: 300.6 CM/SEC
BILIRUB BLD-MCNC: NEGATIVE MG/DL
BILIRUB BLD-MCNC: NEGATIVE MG/DL
BILIRUB CONJ SERPL-MCNC: <0.2 MG/DL (ref 0–0.3)
BILIRUB INDIRECT SERPL-MCNC: ABNORMAL MG/DL
BILIRUB INDIRECT SERPL-MCNC: ABNORMAL MG/DL
BILIRUB SERPL-MCNC: 0.3 MG/DL (ref 0–1.2)
BILIRUB SERPL-MCNC: 0.3 MG/DL (ref 0–1.2)
BILIRUB SERPL-MCNC: 0.4 MG/DL (ref 0–1.2)
BILIRUB SERPL-MCNC: 0.6 MG/DL (ref 0–1.2)
BILIRUB SERPL-MCNC: <0.2 MG/DL (ref 0–1.2)
BILIRUB UR QL STRIP: ABNORMAL
BILIRUB UR QL STRIP: NEGATIVE
BOTTLE TYPE: ABNORMAL
BUN SERPL-MCNC: 10 MG/DL (ref 8–23)
BUN SERPL-MCNC: 11 MG/DL (ref 8–23)
BUN SERPL-MCNC: 11 MG/DL (ref 8–23)
BUN SERPL-MCNC: 12 MG/DL (ref 8–23)
BUN SERPL-MCNC: 12 MG/DL (ref 8–23)
BUN SERPL-MCNC: 13 MG/DL (ref 8–23)
BUN SERPL-MCNC: 13 MG/DL (ref 8–23)
BUN SERPL-MCNC: 14 MG/DL (ref 8–23)
BUN SERPL-MCNC: 17 MG/DL (ref 8–23)
BUN SERPL-MCNC: 17 MG/DL (ref 8–23)
BUN SERPL-MCNC: 21 MG/DL (ref 8–23)
BUN SERPL-MCNC: 21 MG/DL (ref 8–27)
BUN SERPL-MCNC: 24 MG/DL (ref 8–23)
BUN SERPL-MCNC: 24 MG/DL (ref 8–23)
BUN SERPL-MCNC: 27 MG/DL (ref 8–23)
BUN SERPL-MCNC: 33 MG/DL (ref 8–23)
BUN SERPL-MCNC: 8 MG/DL (ref 8–23)
BUN/CREAT SERPL: 10.4 (ref 7–25)
BUN/CREAT SERPL: 11.5 (ref 7–25)
BUN/CREAT SERPL: 12.5 (ref 7–25)
BUN/CREAT SERPL: 12.5 (ref 7–25)
BUN/CREAT SERPL: 13 (ref 7–25)
BUN/CREAT SERPL: 14.5 (ref 7–25)
BUN/CREAT SERPL: 15 (ref 12–28)
BUN/CREAT SERPL: 15 (ref 7–25)
BUN/CREAT SERPL: 15.6 (ref 7–25)
BUN/CREAT SERPL: 16 (ref 7–25)
BUN/CREAT SERPL: 16.3 (ref 7–25)
BUN/CREAT SERPL: 16.9 (ref 7–25)
BUN/CREAT SERPL: 16.9 (ref 7–25)
BUN/CREAT SERPL: 19.1 (ref 7–25)
BUN/CREAT SERPL: 21 (ref 7–25)
BUN/CREAT SERPL: 24 (ref 7–25)
BUN/CREAT SERPL: 25.3 (ref 7–25)
CALCIUM SERPL-MCNC: 10.1 MG/DL (ref 8.7–10.3)
CALCIUM SPEC-SCNC: 10.1 MG/DL (ref 8.6–10.5)
CALCIUM SPEC-SCNC: 8.2 MG/DL (ref 8.6–10.5)
CALCIUM SPEC-SCNC: 8.7 MG/DL (ref 8.6–10.5)
CALCIUM SPEC-SCNC: 9 MG/DL (ref 8.6–10.5)
CALCIUM SPEC-SCNC: 9.1 MG/DL (ref 8.6–10.5)
CALCIUM SPEC-SCNC: 9.3 MG/DL (ref 8.6–10.5)
CALCIUM SPEC-SCNC: 9.4 MG/DL (ref 8.6–10.5)
CALCIUM SPEC-SCNC: 9.5 MG/DL (ref 8.6–10.5)
CALCIUM SPEC-SCNC: 9.6 MG/DL (ref 8.6–10.5)
CALCIUM SPEC-SCNC: 9.7 MG/DL (ref 8.6–10.5)
CALCIUM SPEC-SCNC: 9.8 MG/DL (ref 8.6–10.5)
CHLORIDE SERPL-SCNC: 100 MMOL/L (ref 98–107)
CHLORIDE SERPL-SCNC: 101 MMOL/L (ref 96–106)
CHLORIDE SERPL-SCNC: 101 MMOL/L (ref 98–107)
CHLORIDE SERPL-SCNC: 102 MMOL/L (ref 98–107)
CHLORIDE SERPL-SCNC: 104 MMOL/L (ref 98–107)
CHLORIDE SERPL-SCNC: 106 MMOL/L (ref 98–107)
CHLORIDE SERPL-SCNC: 106 MMOL/L (ref 98–107)
CHLORIDE SERPL-SCNC: 107 MMOL/L (ref 98–107)
CHLORIDE SERPL-SCNC: 107 MMOL/L (ref 98–107)
CHLORIDE SERPL-SCNC: 90 MMOL/L (ref 98–107)
CHLORIDE SERPL-SCNC: 96 MMOL/L (ref 98–107)
CHLORIDE SERPL-SCNC: 96 MMOL/L (ref 98–107)
CHLORIDE SERPL-SCNC: 97 MMOL/L (ref 98–107)
CHLORIDE SERPL-SCNC: 98 MMOL/L (ref 98–107)
CK SERPL-CCNC: 18 U/L (ref 20–180)
CK SERPL-CCNC: 23 U/L (ref 20–180)
CK SERPL-CCNC: 449 U/L (ref 20–180)
CK SERPL-CCNC: 70 U/L (ref 20–180)
CLARITY UR: ABNORMAL
CLARITY UR: CLEAR
CLARITY, POC: ABNORMAL
CLARITY, POC: ABNORMAL
CO2 SERPL-SCNC: 19 MMOL/L (ref 22–29)
CO2 SERPL-SCNC: 20 MMOL/L (ref 22–29)
CO2 SERPL-SCNC: 20 MMOL/L (ref 22–29)
CO2 SERPL-SCNC: 21 MMOL/L (ref 20–29)
CO2 SERPL-SCNC: 22 MMOL/L (ref 22–29)
CO2 SERPL-SCNC: 23 MMOL/L (ref 22–29)
CO2 SERPL-SCNC: 24 MMOL/L (ref 22–29)
CO2 SERPL-SCNC: 24 MMOL/L (ref 22–29)
CO2 SERPL-SCNC: 25 MMOL/L (ref 22–29)
CO2 SERPL-SCNC: 25 MMOL/L (ref 22–29)
CO2 SERPL-SCNC: 26 MMOL/L (ref 22–29)
CO2 SERPL-SCNC: 27 MMOL/L (ref 22–29)
CO2 SERPL-SCNC: 27 MMOL/L (ref 22–29)
CO2 SERPL-SCNC: 28 MMOL/L (ref 22–29)
CO2 SERPL-SCNC: 29 MMOL/L (ref 22–29)
COLOR UR: ABNORMAL
COLOR UR: YELLOW
CREAT SERPL-MCNC: 0.77 MG/DL (ref 0.57–1)
CREAT SERPL-MCNC: 0.77 MG/DL (ref 0.57–1)
CREAT SERPL-MCNC: 0.8 MG/DL (ref 0.57–1)
CREAT SERPL-MCNC: 0.8 MG/DL (ref 0.57–1)
CREAT SERPL-MCNC: 0.81 MG/DL (ref 0.57–1)
CREAT SERPL-MCNC: 0.81 MG/DL (ref 0.57–1)
CREAT SERPL-MCNC: 0.83 MG/DL (ref 0.57–1)
CREAT SERPL-MCNC: 0.83 MG/DL (ref 0.57–1)
CREAT SERPL-MCNC: 0.88 MG/DL (ref 0.57–1)
CREAT SERPL-MCNC: 0.89 MG/DL (ref 0.57–1)
CREAT SERPL-MCNC: 0.96 MG/DL (ref 0.57–1)
CREAT SERPL-MCNC: 0.96 MG/DL (ref 0.57–1)
CREAT SERPL-MCNC: 1 MG/DL (ref 0.57–1)
CREAT SERPL-MCNC: 1.44 MG/DL (ref 0.57–1)
CREAT SERPL-MCNC: 1.54 MG/DL (ref 0.57–1)
CREAT SERPL-MCNC: 1.86 MG/DL (ref 0.57–1)
CREAT SERPL-MCNC: 1.95 MG/DL (ref 0.57–1)
DEPRECATED RDW RBC AUTO: 45.9 FL (ref 37–54)
DEPRECATED RDW RBC AUTO: 46 FL (ref 37–54)
DEPRECATED RDW RBC AUTO: 46.4 FL (ref 37–54)
DEPRECATED RDW RBC AUTO: 46.4 FL (ref 37–54)
DEPRECATED RDW RBC AUTO: 46.8 FL (ref 37–54)
DEPRECATED RDW RBC AUTO: 47.3 FL (ref 37–54)
DEPRECATED RDW RBC AUTO: 47.7 FL (ref 37–54)
DEPRECATED RDW RBC AUTO: 47.7 FL (ref 37–54)
DEPRECATED RDW RBC AUTO: 48.1 FL (ref 37–54)
DEPRECATED RDW RBC AUTO: 48.6 FL (ref 37–54)
DEPRECATED RDW RBC AUTO: 49 FL (ref 37–54)
DEPRECATED RDW RBC AUTO: 49.4 FL (ref 37–54)
DEPRECATED RDW RBC AUTO: 49.4 FL (ref 37–54)
DEPRECATED RDW RBC AUTO: 49.9 FL (ref 37–54)
DEPRECATED RDW RBC AUTO: 50.3 FL (ref 37–54)
DEPRECATED RDW RBC AUTO: 52.5 FL (ref 37–54)
EGFRCR SERPLBLD CKD-EPI 2021: 24.2 ML/MIN/1.73
EGFRCR SERPLBLD CKD-EPI 2021: 25.6 ML/MIN/1.73
EGFRCR SERPLBLD CKD-EPI 2021: 32.1 ML/MIN/1.73
EGFRCR SERPLBLD CKD-EPI 2021: 35 ML/MIN/1.73
EGFRCR SERPLBLD CKD-EPI 2021: 54 ML/MIN/1.73
EGFRCR SERPLBLD CKD-EPI 2021: 56.7 ML/MIN/1.73
EGFRCR SERPLBLD CKD-EPI 2021: 62.1 ML/MIN/1.73
EGFRCR SERPLBLD CKD-EPI 2021: 62.9 ML/MIN/1.73
EGFRCR SERPLBLD CKD-EPI 2021: 67.5 ML/MIN/1.73
EGFRCR SERPLBLD CKD-EPI 2021: 67.5 ML/MIN/1.73
EGFRCR SERPLBLD CKD-EPI 2021: 69.5 ML/MIN/1.73
EGFRCR SERPLBLD CKD-EPI 2021: 69.5 ML/MIN/1.73
EGFRCR SERPLBLD CKD-EPI 2021: 70.5 ML/MIN/1.73
EGFRCR SERPLBLD CKD-EPI 2021: 70.5 ML/MIN/1.73
EGFRCR SERPLBLD CKD-EPI 2021: 73.8 ML/MIN/1.73
EGFRCR SERPLBLD CKD-EPI 2021: 73.8 ML/MIN/1.73
EOSINOPHIL # BLD AUTO: 0 10*3/MM3 (ref 0–0.4)
EOSINOPHIL # BLD AUTO: 0 X10E3/UL (ref 0–0.4)
EOSINOPHIL # BLD AUTO: 0.09 10*3/MM3 (ref 0–0.4)
EOSINOPHIL # BLD AUTO: 0.1 10*3/MM3 (ref 0–0.4)
EOSINOPHIL NFR BLD AUTO: 0 %
EOSINOPHIL NFR BLD AUTO: 0.1 % (ref 0.3–6.2)
EOSINOPHIL NFR BLD AUTO: 0.3 % (ref 0.3–6.2)
EOSINOPHIL NFR BLD AUTO: 0.7 % (ref 0.3–6.2)
EOSINOPHIL NFR BLD AUTO: 0.8 % (ref 0.3–6.2)
EOSINOPHIL NFR BLD AUTO: 0.8 % (ref 0.3–6.2)
EOSINOPHIL NFR BLD AUTO: 1.1 % (ref 0.3–6.2)
EOSINOPHIL NFR BLD AUTO: 1.1 % (ref 0.3–6.2)
EOSINOPHIL NFR BLD AUTO: 1.2 % (ref 0.3–6.2)
EOSINOPHIL NFR BLD AUTO: 1.3 % (ref 0.3–6.2)
EOSINOPHIL NFR BLD AUTO: 1.4 % (ref 0.3–6.2)
EOSINOPHIL NFR BLD AUTO: 1.4 % (ref 0.3–6.2)
EOSINOPHIL NFR BLD AUTO: 1.5 % (ref 0.3–6.2)
EOSINOPHIL NFR BLD AUTO: 1.7 % (ref 0.3–6.2)
EOSINOPHIL NFR BLD AUTO: 1.8 % (ref 0.3–6.2)
ERYTHROCYTE [DISTWIDTH] IN BLOOD BY AUTOMATED COUNT: 12.1 % (ref 11.7–15.4)
ERYTHROCYTE [DISTWIDTH] IN BLOOD BY AUTOMATED COUNT: 12.4 % (ref 12.3–15.4)
ERYTHROCYTE [DISTWIDTH] IN BLOOD BY AUTOMATED COUNT: 13.1 % (ref 12.3–15.4)
ERYTHROCYTE [DISTWIDTH] IN BLOOD BY AUTOMATED COUNT: 13.3 % (ref 12.3–15.4)
ERYTHROCYTE [DISTWIDTH] IN BLOOD BY AUTOMATED COUNT: 13.3 % (ref 12.3–15.4)
ERYTHROCYTE [DISTWIDTH] IN BLOOD BY AUTOMATED COUNT: 13.4 % (ref 12.3–15.4)
ERYTHROCYTE [DISTWIDTH] IN BLOOD BY AUTOMATED COUNT: 13.4 % (ref 12.3–15.4)
ERYTHROCYTE [DISTWIDTH] IN BLOOD BY AUTOMATED COUNT: 13.5 % (ref 12.3–15.4)
ERYTHROCYTE [DISTWIDTH] IN BLOOD BY AUTOMATED COUNT: 13.5 % (ref 12.3–15.4)
ERYTHROCYTE [DISTWIDTH] IN BLOOD BY AUTOMATED COUNT: 13.8 % (ref 12.3–15.4)
ERYTHROCYTE [DISTWIDTH] IN BLOOD BY AUTOMATED COUNT: 13.8 % (ref 12.3–15.4)
ERYTHROCYTE [DISTWIDTH] IN BLOOD BY AUTOMATED COUNT: 13.9 % (ref 12.3–15.4)
ERYTHROCYTE [DISTWIDTH] IN BLOOD BY AUTOMATED COUNT: 13.9 % (ref 12.3–15.4)
ERYTHROCYTE [DISTWIDTH] IN BLOOD BY AUTOMATED COUNT: 14 % (ref 12.3–15.4)
ERYTHROCYTE [DISTWIDTH] IN BLOOD BY AUTOMATED COUNT: 14 % (ref 12.3–15.4)
ERYTHROCYTE [DISTWIDTH] IN BLOOD BY AUTOMATED COUNT: 14.1 % (ref 12.3–15.4)
ERYTHROCYTE [DISTWIDTH] IN BLOOD BY AUTOMATED COUNT: 14.3 % (ref 12.3–15.4)
GFR SERPL CREATININE-BSD FRML MDRD: 55 ML/MIN/1.73
GLOBULIN SER CALC-MCNC: 2.8 G/DL (ref 1.5–4.5)
GLOBULIN UR ELPH-MCNC: 2.7 GM/DL
GLOBULIN UR ELPH-MCNC: 2.8 GM/DL
GLOBULIN UR ELPH-MCNC: 3 GM/DL
GLOBULIN UR ELPH-MCNC: 3 GM/DL
GLOBULIN UR ELPH-MCNC: 3.1 GM/DL
GLUCOSE SERPL-MCNC: 100 MG/DL (ref 65–99)
GLUCOSE SERPL-MCNC: 104 MG/DL (ref 65–99)
GLUCOSE SERPL-MCNC: 104 MG/DL (ref 65–99)
GLUCOSE SERPL-MCNC: 137 MG/DL (ref 65–99)
GLUCOSE SERPL-MCNC: 86 MG/DL (ref 65–99)
GLUCOSE SERPL-MCNC: 88 MG/DL (ref 65–99)
GLUCOSE SERPL-MCNC: 88 MG/DL (ref 65–99)
GLUCOSE SERPL-MCNC: 89 MG/DL (ref 65–99)
GLUCOSE SERPL-MCNC: 91 MG/DL (ref 65–99)
GLUCOSE SERPL-MCNC: 92 MG/DL (ref 65–99)
GLUCOSE SERPL-MCNC: 94 MG/DL (ref 65–99)
GLUCOSE SERPL-MCNC: 96 MG/DL (ref 65–99)
GLUCOSE SERPL-MCNC: 96 MG/DL (ref 65–99)
GLUCOSE SERPL-MCNC: 99 MG/DL (ref 65–99)
GLUCOSE UR STRIP-MCNC: NEGATIVE MG/DL
GRAM STN SPEC: ABNORMAL
HCT VFR BLD AUTO: 24.4 % (ref 34–46.6)
HCT VFR BLD AUTO: 25.3 % (ref 34–46.6)
HCT VFR BLD AUTO: 25.8 % (ref 34–46.6)
HCT VFR BLD AUTO: 25.9 % (ref 34–46.6)
HCT VFR BLD AUTO: 25.9 % (ref 34–46.6)
HCT VFR BLD AUTO: 26 % (ref 34–46.6)
HCT VFR BLD AUTO: 26 % (ref 34–46.6)
HCT VFR BLD AUTO: 26.6 % (ref 34–46.6)
HCT VFR BLD AUTO: 27.2 % (ref 34–46.6)
HCT VFR BLD AUTO: 27.2 % (ref 34–46.6)
HCT VFR BLD AUTO: 27.3 % (ref 34–46.6)
HCT VFR BLD AUTO: 27.7 % (ref 34–46.6)
HCT VFR BLD AUTO: 27.7 % (ref 34–46.6)
HCT VFR BLD AUTO: 28.4 % (ref 34–46.6)
HCT VFR BLD AUTO: 28.7 % (ref 34–46.6)
HCT VFR BLD AUTO: 29 % (ref 34–46.6)
HCT VFR BLD AUTO: 29.4 % (ref 34–46.6)
HGB BLD-MCNC: 8.4 G/DL (ref 12–15.9)
HGB BLD-MCNC: 8.5 G/DL (ref 12–15.9)
HGB BLD-MCNC: 8.5 G/DL (ref 12–15.9)
HGB BLD-MCNC: 8.8 G/DL (ref 12–15.9)
HGB BLD-MCNC: 9.1 G/DL (ref 12–15.9)
HGB BLD-MCNC: 9.1 G/DL (ref 12–15.9)
HGB BLD-MCNC: 9.2 G/DL (ref 12–15.9)
HGB BLD-MCNC: 9.2 G/DL (ref 12–15.9)
HGB BLD-MCNC: 9.3 G/DL (ref 12–15.9)
HGB BLD-MCNC: 9.4 G/DL (ref 12–15.9)
HGB BLD-MCNC: 9.4 G/DL (ref 12–15.9)
HGB BLD-MCNC: 9.5 G/DL (ref 12–15.9)
HGB BLD-MCNC: 9.8 G/DL (ref 12–15.9)
HGB BLD-MCNC: 9.8 G/DL (ref 12–15.9)
HGB BLD-MCNC: 9.9 G/DL (ref 11.1–15.9)
HGB UR QL STRIP.AUTO: ABNORMAL
HOLD SPECIMEN: NORMAL
HOLD SPECIMEN: NORMAL
HYALINE CASTS UR QL AUTO: ABNORMAL /LPF
IMM GRANULOCYTES # BLD AUTO: 0 X10E3/UL (ref 0–0.1)
IMM GRANULOCYTES NFR BLD AUTO: 1 %
INR PPP: 1.16 (ref 0.93–1.1)
INR PPP: 1.22 (ref 0.93–1.1)
ISOLATED FROM: ABNORMAL
ISOLATED FROM: ABNORMAL
KETONES UR QL STRIP: NEGATIVE
KETONES UR QL: NEGATIVE
KETONES UR QL: NEGATIVE
LAB AP CASE REPORT: NORMAL
LAB AP SYNOPTIC CHECKLIST: NORMAL
LDH SERPL-CCNC: 108 U/L (ref 135–214)
LEUKOCYTE EST, POC: ABNORMAL
LEUKOCYTE EST, POC: ABNORMAL
LEUKOCYTE ESTERASE UR QL STRIP.AUTO: ABNORMAL
LYMPHOCYTES # BLD AUTO: 0.8 10*3/MM3 (ref 0.7–3.1)
LYMPHOCYTES # BLD AUTO: 0.9 10*3/MM3 (ref 0.7–3.1)
LYMPHOCYTES # BLD AUTO: 1 10*3/MM3 (ref 0.7–3.1)
LYMPHOCYTES # BLD AUTO: 1.2 10*3/MM3 (ref 0.7–3.1)
LYMPHOCYTES # BLD AUTO: 1.3 10*3/MM3 (ref 0.7–3.1)
LYMPHOCYTES # BLD AUTO: 1.4 10*3/MM3 (ref 0.7–3.1)
LYMPHOCYTES # BLD AUTO: 1.4 10*3/MM3 (ref 0.7–3.1)
LYMPHOCYTES # BLD AUTO: 1.5 10*3/MM3 (ref 0.7–3.1)
LYMPHOCYTES # BLD AUTO: 1.6 10*3/MM3 (ref 0.7–3.1)
LYMPHOCYTES # BLD AUTO: 1.6 10*3/MM3 (ref 0.7–3.1)
LYMPHOCYTES # BLD AUTO: 1.6 X10E3/UL (ref 0.7–3.1)
LYMPHOCYTES # BLD AUTO: 1.7 10*3/MM3 (ref 0.7–3.1)
LYMPHOCYTES # BLD AUTO: 1.87 10*3/MM3 (ref 0.7–3.1)
LYMPHOCYTES NFR BLD AUTO: 12.9 % (ref 19.6–45.3)
LYMPHOCYTES NFR BLD AUTO: 13.8 % (ref 19.6–45.3)
LYMPHOCYTES NFR BLD AUTO: 16.5 % (ref 19.6–45.3)
LYMPHOCYTES NFR BLD AUTO: 18.6 % (ref 19.6–45.3)
LYMPHOCYTES NFR BLD AUTO: 19.7 % (ref 19.6–45.3)
LYMPHOCYTES NFR BLD AUTO: 21.6 % (ref 19.6–45.3)
LYMPHOCYTES NFR BLD AUTO: 21.8 % (ref 19.6–45.3)
LYMPHOCYTES NFR BLD AUTO: 23 %
LYMPHOCYTES NFR BLD AUTO: 23.1 % (ref 19.6–45.3)
LYMPHOCYTES NFR BLD AUTO: 23.3 % (ref 19.6–45.3)
LYMPHOCYTES NFR BLD AUTO: 23.9 % (ref 19.6–45.3)
LYMPHOCYTES NFR BLD AUTO: 25.3 % (ref 19.6–45.3)
LYMPHOCYTES NFR BLD AUTO: 25.5 % (ref 19.6–45.3)
LYMPHOCYTES NFR BLD AUTO: 26.2 % (ref 19.6–45.3)
LYMPHOCYTES NFR BLD AUTO: 32.8 % (ref 19.6–45.3)
LYMPHOCYTES NFR BLD AUTO: 35.5 % (ref 19.6–45.3)
LYMPHOCYTES NFR BLD AUTO: 35.8 % (ref 19.6–45.3)
MAGNESIUM SERPL-MCNC: 1.8 MG/DL (ref 1.6–2.4)
MAGNESIUM SERPL-MCNC: 1.8 MG/DL (ref 1.6–2.4)
MAXIMAL PREDICTED HEART RATE: 131 BPM
MCH RBC QN AUTO: 33.3 PG (ref 26.6–33)
MCH RBC QN AUTO: 33.4 PG (ref 26.6–33)
MCH RBC QN AUTO: 33.8 PG (ref 26.6–33)
MCH RBC QN AUTO: 33.9 PG (ref 26.6–33)
MCH RBC QN AUTO: 33.9 PG (ref 26.6–33)
MCH RBC QN AUTO: 34 PG (ref 26.6–33)
MCH RBC QN AUTO: 34.1 PG (ref 26.6–33)
MCH RBC QN AUTO: 34.2 PG (ref 26.6–33)
MCH RBC QN AUTO: 34.3 PG (ref 26.6–33)
MCH RBC QN AUTO: 34.4 PG (ref 26.6–33)
MCH RBC QN AUTO: 34.9 PG (ref 26.6–33)
MCH RBC QN AUTO: 35.1 PG (ref 26.6–33)
MCH RBC QN AUTO: 35.4 PG (ref 26.6–33)
MCH RBC QN AUTO: 35.6 PG (ref 26.6–33)
MCH RBC QN AUTO: 35.8 PG (ref 26.6–33)
MCHC RBC AUTO-ENTMCNC: 32 G/DL (ref 31.5–35.7)
MCHC RBC AUTO-ENTMCNC: 32.1 G/DL (ref 31.5–35.7)
MCHC RBC AUTO-ENTMCNC: 32.8 G/DL (ref 31.5–35.7)
MCHC RBC AUTO-ENTMCNC: 33.1 G/DL (ref 31.5–35.7)
MCHC RBC AUTO-ENTMCNC: 33.4 G/DL (ref 31.5–35.7)
MCHC RBC AUTO-ENTMCNC: 33.7 G/DL (ref 31.5–35.7)
MCHC RBC AUTO-ENTMCNC: 33.9 G/DL (ref 31.5–35.7)
MCHC RBC AUTO-ENTMCNC: 34 G/DL (ref 31.5–35.7)
MCHC RBC AUTO-ENTMCNC: 34.4 G/DL (ref 31.5–35.7)
MCHC RBC AUTO-ENTMCNC: 34.5 G/DL (ref 31.5–35.7)
MCHC RBC AUTO-ENTMCNC: 34.7 G/DL (ref 31.5–35.7)
MCHC RBC AUTO-ENTMCNC: 35 G/DL (ref 31.5–35.7)
MCHC RBC AUTO-ENTMCNC: 35.2 G/DL (ref 31.5–35.7)
MCHC RBC AUTO-ENTMCNC: 35.4 G/DL (ref 31.5–35.7)
MCHC RBC AUTO-ENTMCNC: 35.6 G/DL (ref 31.5–35.7)
MCV RBC AUTO: 100.2 FL (ref 79–97)
MCV RBC AUTO: 100.4 FL (ref 79–97)
MCV RBC AUTO: 100.7 FL (ref 79–97)
MCV RBC AUTO: 101.4 FL (ref 79–97)
MCV RBC AUTO: 101.8 FL (ref 79–97)
MCV RBC AUTO: 102 FL (ref 79–97)
MCV RBC AUTO: 102 FL (ref 79–97)
MCV RBC AUTO: 102.5 FL (ref 79–97)
MCV RBC AUTO: 104.1 FL (ref 79–97)
MCV RBC AUTO: 104.3 FL (ref 79–97)
MCV RBC AUTO: 104.4 FL (ref 79–97)
MCV RBC AUTO: 99.1 FL (ref 79–97)
MCV RBC AUTO: 99.1 FL (ref 79–97)
MCV RBC AUTO: 99.6 FL (ref 79–97)
MCV RBC AUTO: 99.7 FL (ref 79–97)
MCV RBC AUTO: 99.8 FL (ref 79–97)
MCV RBC AUTO: 99.9 FL (ref 79–97)
MONOCYTES # BLD AUTO: 0.49 10*3/MM3 (ref 0.1–0.9)
MONOCYTES # BLD AUTO: 0.5 10*3/MM3 (ref 0.1–0.9)
MONOCYTES # BLD AUTO: 0.5 10*3/MM3 (ref 0.1–0.9)
MONOCYTES # BLD AUTO: 0.5 X10E3/UL (ref 0.1–0.9)
MONOCYTES # BLD AUTO: 0.6 10*3/MM3 (ref 0.1–0.9)
MONOCYTES # BLD AUTO: 0.7 10*3/MM3 (ref 0.1–0.9)
MONOCYTES # BLD AUTO: 0.8 10*3/MM3 (ref 0.1–0.9)
MONOCYTES # BLD AUTO: 0.9 10*3/MM3 (ref 0.1–0.9)
MONOCYTES # BLD AUTO: 0.9 10*3/MM3 (ref 0.1–0.9)
MONOCYTES NFR BLD AUTO: 10 % (ref 5–12)
MONOCYTES NFR BLD AUTO: 10.2 % (ref 5–12)
MONOCYTES NFR BLD AUTO: 10.3 % (ref 5–12)
MONOCYTES NFR BLD AUTO: 12.7 % (ref 5–12)
MONOCYTES NFR BLD AUTO: 12.8 % (ref 5–12)
MONOCYTES NFR BLD AUTO: 13 % (ref 5–12)
MONOCYTES NFR BLD AUTO: 13.1 % (ref 5–12)
MONOCYTES NFR BLD AUTO: 13.4 % (ref 5–12)
MONOCYTES NFR BLD AUTO: 13.8 % (ref 5–12)
MONOCYTES NFR BLD AUTO: 13.9 % (ref 5–12)
MONOCYTES NFR BLD AUTO: 14.4 % (ref 5–12)
MONOCYTES NFR BLD AUTO: 15.5 % (ref 5–12)
MONOCYTES NFR BLD AUTO: 17.5 % (ref 5–12)
MONOCYTES NFR BLD AUTO: 7 %
MONOCYTES NFR BLD AUTO: 7.7 % (ref 5–12)
MONOCYTES NFR BLD AUTO: 8.1 % (ref 5–12)
MONOCYTES NFR BLD AUTO: 9.4 % (ref 5–12)
NEUTROPHILS # BLD AUTO: 4.8 X10E3/UL (ref 1.4–7)
NEUTROPHILS NFR BLD AUTO: 1.7 10*3/MM3 (ref 1.7–7)
NEUTROPHILS NFR BLD AUTO: 2.6 10*3/MM3 (ref 1.7–7)
NEUTROPHILS NFR BLD AUTO: 2.74 10*3/MM3 (ref 1.7–7)
NEUTROPHILS NFR BLD AUTO: 2.9 10*3/MM3 (ref 1.7–7)
NEUTROPHILS NFR BLD AUTO: 3 10*3/MM3 (ref 1.7–7)
NEUTROPHILS NFR BLD AUTO: 3.2 10*3/MM3 (ref 1.7–7)
NEUTROPHILS NFR BLD AUTO: 3.3 10*3/MM3 (ref 1.7–7)
NEUTROPHILS NFR BLD AUTO: 3.5 10*3/MM3 (ref 1.7–7)
NEUTROPHILS NFR BLD AUTO: 3.8 10*3/MM3 (ref 1.7–7)
NEUTROPHILS NFR BLD AUTO: 3.9 10*3/MM3 (ref 1.7–7)
NEUTROPHILS NFR BLD AUTO: 3.9 10*3/MM3 (ref 1.7–7)
NEUTROPHILS NFR BLD AUTO: 4.1 10*3/MM3 (ref 1.7–7)
NEUTROPHILS NFR BLD AUTO: 4.3 10*3/MM3 (ref 1.7–7)
NEUTROPHILS NFR BLD AUTO: 4.7 10*3/MM3 (ref 1.7–7)
NEUTROPHILS NFR BLD AUTO: 4.7 10*3/MM3 (ref 1.7–7)
NEUTROPHILS NFR BLD AUTO: 46.9 % (ref 42.7–76)
NEUTROPHILS NFR BLD AUTO: 5 10*3/MM3 (ref 1.7–7)
NEUTROPHILS NFR BLD AUTO: 50.3 % (ref 42.7–76)
NEUTROPHILS NFR BLD AUTO: 52.3 % (ref 42.7–76)
NEUTROPHILS NFR BLD AUTO: 54.6 % (ref 42.7–76)
NEUTROPHILS NFR BLD AUTO: 58 % (ref 42.7–76)
NEUTROPHILS NFR BLD AUTO: 60.8 % (ref 42.7–76)
NEUTROPHILS NFR BLD AUTO: 62.3 % (ref 42.7–76)
NEUTROPHILS NFR BLD AUTO: 62.6 % (ref 42.7–76)
NEUTROPHILS NFR BLD AUTO: 63.5 % (ref 42.7–76)
NEUTROPHILS NFR BLD AUTO: 64.5 % (ref 42.7–76)
NEUTROPHILS NFR BLD AUTO: 65.1 % (ref 42.7–76)
NEUTROPHILS NFR BLD AUTO: 65.5 % (ref 42.7–76)
NEUTROPHILS NFR BLD AUTO: 69 %
NEUTROPHILS NFR BLD AUTO: 69.9 % (ref 42.7–76)
NEUTROPHILS NFR BLD AUTO: 72.1 % (ref 42.7–76)
NEUTROPHILS NFR BLD AUTO: 74.1 % (ref 42.7–76)
NEUTROPHILS NFR BLD AUTO: 75.4 % (ref 42.7–76)
NITRITE UR QL STRIP: NEGATIVE
NITRITE UR QL STRIP: POSITIVE
NITRITE UR-MCNC: NEGATIVE MG/ML
NITRITE UR-MCNC: NEGATIVE MG/ML
NRBC BLD AUTO-RTO: 0 /100 WBC (ref 0–0.2)
NRBC BLD AUTO-RTO: 0.1 /100 WBC (ref 0–0.2)
NRBC BLD AUTO-RTO: 0.2 /100 WBC (ref 0–0.2)
NRBC BLD AUTO-RTO: 0.3 /100 WBC (ref 0–0.2)
OSMOLALITY UR: 218 MOSM/KG (ref 300–800)
OTHER ANTIBIOTIC SUSC ISLT: ABNORMAL
OTHER ANTIBIOTIC SUSC ISLT: ABNORMAL
PATH REPORT.FINAL DX SPEC: NORMAL
PATH REPORT.GROSS SPEC: NORMAL
PH UR STRIP.AUTO: 5.5 [PH] (ref 5–8)
PH UR STRIP.AUTO: 5.5 [PH] (ref 5–8)
PH UR STRIP.AUTO: 6 [PH] (ref 5–8)
PH UR STRIP.AUTO: 6.5 [PH] (ref 5–8)
PH UR STRIP.AUTO: 7 [PH] (ref 5–8)
PH UR: 6 [PH] (ref 5–8)
PH UR: 9 [PH] (ref 5–8)
PLATELET # BLD AUTO: 127 10*3/MM3 (ref 140–450)
PLATELET # BLD AUTO: 131 10*3/MM3 (ref 140–450)
PLATELET # BLD AUTO: 137 10*3/MM3 (ref 140–450)
PLATELET # BLD AUTO: 139 10*3/MM3 (ref 140–450)
PLATELET # BLD AUTO: 141 10*3/MM3 (ref 140–450)
PLATELET # BLD AUTO: 147 10*3/MM3 (ref 140–450)
PLATELET # BLD AUTO: 147 10*3/MM3 (ref 140–450)
PLATELET # BLD AUTO: 161 10*3/MM3 (ref 140–450)
PLATELET # BLD AUTO: 170 10*3/MM3 (ref 140–450)
PLATELET # BLD AUTO: 174 10*3/MM3 (ref 140–450)
PLATELET # BLD AUTO: 178 10*3/MM3 (ref 140–450)
PLATELET # BLD AUTO: 184 10*3/MM3 (ref 140–450)
PLATELET # BLD AUTO: 190 10*3/MM3 (ref 140–450)
PLATELET # BLD AUTO: 191 10*3/MM3 (ref 140–450)
PLATELET # BLD AUTO: 200 10*3/MM3 (ref 140–450)
PLATELET # BLD AUTO: 207 10*3/MM3 (ref 140–450)
PLATELET # BLD AUTO: 211 X10E3/UL (ref 150–450)
PMV BLD AUTO: 7.6 FL (ref 6–12)
PMV BLD AUTO: 7.8 FL (ref 6–12)
PMV BLD AUTO: 7.9 FL (ref 6–12)
PMV BLD AUTO: 8 FL (ref 6–12)
PMV BLD AUTO: 8 FL (ref 6–12)
PMV BLD AUTO: 8.1 FL (ref 6–12)
PMV BLD AUTO: 8.2 FL (ref 6–12)
PMV BLD AUTO: 8.2 FL (ref 6–12)
PMV BLD AUTO: 8.3 FL (ref 6–12)
PMV BLD AUTO: 8.7 FL (ref 6–12)
PMV BLD AUTO: 9.4 FL (ref 6–12)
POTASSIUM SERPL-SCNC: 3.8 MMOL/L (ref 3.5–5.2)
POTASSIUM SERPL-SCNC: 3.9 MMOL/L (ref 3.5–5.2)
POTASSIUM SERPL-SCNC: 4 MMOL/L (ref 3.5–5.2)
POTASSIUM SERPL-SCNC: 4 MMOL/L (ref 3.5–5.2)
POTASSIUM SERPL-SCNC: 4.1 MMOL/L (ref 3.5–5.2)
POTASSIUM SERPL-SCNC: 4.4 MMOL/L (ref 3.5–5.2)
POTASSIUM SERPL-SCNC: 4.4 MMOL/L (ref 3.5–5.2)
POTASSIUM SERPL-SCNC: 4.5 MMOL/L (ref 3.5–5.2)
POTASSIUM SERPL-SCNC: 4.5 MMOL/L (ref 3.5–5.2)
POTASSIUM SERPL-SCNC: 4.6 MMOL/L (ref 3.5–5.2)
POTASSIUM SERPL-SCNC: 4.6 MMOL/L (ref 3.5–5.2)
POTASSIUM SERPL-SCNC: 4.7 MMOL/L (ref 3.5–5.2)
POTASSIUM SERPL-SCNC: 4.7 MMOL/L (ref 3.5–5.2)
POTASSIUM SERPL-SCNC: 4.9 MMOL/L (ref 3.5–5.2)
POTASSIUM SERPL-SCNC: 4.9 MMOL/L (ref 3.5–5.2)
PROT SERPL-MCNC: 5.5 G/DL (ref 6–8.5)
PROT SERPL-MCNC: 5.9 G/DL (ref 6–8.5)
PROT SERPL-MCNC: 6 G/DL (ref 6–8.5)
PROT SERPL-MCNC: 6.4 G/DL (ref 6–8.5)
PROT SERPL-MCNC: 6.5 G/DL (ref 6–8.5)
PROT SERPL-MCNC: 6.5 G/DL (ref 6–8.5)
PROT SERPL-MCNC: 6.6 G/DL (ref 6–8.5)
PROT SERPL-MCNC: 7 G/DL (ref 6–8.5)
PROT UR QL STRIP: ABNORMAL
PROT UR QL STRIP: NEGATIVE
PROT UR STRIP-MCNC: ABNORMAL MG/DL
PROT UR STRIP-MCNC: ABNORMAL MG/DL
PROTHROMBIN TIME: 11.8 SECONDS (ref 9.6–11.7)
PROTHROMBIN TIME: 12.4 SECONDS (ref 9.6–11.7)
QT INTERVAL: 420 MS
QT INTERVAL: 421 MS
QT INTERVAL: 426 MS
QT INTERVAL: 457 MS
RBC # BLD AUTO: 2.46 10*6/MM3 (ref 3.77–5.28)
RBC # BLD AUTO: 2.49 10*6/MM3 (ref 3.77–5.28)
RBC # BLD AUTO: 2.52 10*6/MM3 (ref 3.77–5.28)
RBC # BLD AUTO: 2.6 10*6/MM3 (ref 3.77–5.28)
RBC # BLD AUTO: 2.6 10*6/MM3 (ref 3.77–5.28)
RBC # BLD AUTO: 2.61 10*6/MM3 (ref 3.77–5.28)
RBC # BLD AUTO: 2.62 10*6/MM3 (ref 3.77–5.28)
RBC # BLD AUTO: 2.62 10*6/MM3 (ref 3.77–5.28)
RBC # BLD AUTO: 2.65 10*6/MM3 (ref 3.77–5.28)
RBC # BLD AUTO: 2.67 10*6/MM3 (ref 3.77–5.28)
RBC # BLD AUTO: 2.72 10*6/MM3 (ref 3.77–5.28)
RBC # BLD AUTO: 2.73 10*6/MM3 (ref 3.77–5.28)
RBC # BLD AUTO: 2.73 10*6/MM3 (ref 3.77–5.28)
RBC # BLD AUTO: 2.75 10*6/MM3 (ref 3.77–5.28)
RBC # BLD AUTO: 2.75 10*6/MM3 (ref 3.77–5.28)
RBC # BLD AUTO: 2.88 10*6/MM3 (ref 3.77–5.28)
RBC # BLD AUTO: 2.89 X10E6/UL (ref 3.77–5.28)
RBC # UR STRIP: ABNORMAL /HPF
RBC # UR STRIP: ABNORMAL /UL
RBC # UR STRIP: ABNORMAL /UL
REF LAB TEST METHOD: ABNORMAL
SARS-COV-2 RNA PNL SPEC NAA+PROBE: DETECTED
SARS-COV-2 RNA PNL SPEC NAA+PROBE: NOT DETECTED
SARS-COV-2 RNA PNL SPEC NAA+PROBE: NOT DETECTED
SODIUM SERPL-SCNC: 124 MMOL/L (ref 136–145)
SODIUM SERPL-SCNC: 131 MMOL/L (ref 136–145)
SODIUM SERPL-SCNC: 131 MMOL/L (ref 136–145)
SODIUM SERPL-SCNC: 134 MMOL/L (ref 136–145)
SODIUM SERPL-SCNC: 134 MMOL/L (ref 136–145)
SODIUM SERPL-SCNC: 135 MMOL/L (ref 136–145)
SODIUM SERPL-SCNC: 135 MMOL/L (ref 136–145)
SODIUM SERPL-SCNC: 136 MMOL/L (ref 136–145)
SODIUM SERPL-SCNC: 137 MMOL/L (ref 134–144)
SODIUM SERPL-SCNC: 137 MMOL/L (ref 136–145)
SODIUM SERPL-SCNC: 138 MMOL/L (ref 136–145)
SODIUM SERPL-SCNC: 138 MMOL/L (ref 136–145)
SODIUM SERPL-SCNC: 139 MMOL/L (ref 136–145)
SODIUM SERPL-SCNC: 140 MMOL/L (ref 136–145)
SODIUM UR-SCNC: 26 MMOL/L
SP GR UR STRIP: 1.01 (ref 1–1.03)
SP GR UR STRIP: 1.02 (ref 1–1.03)
SP GR UR STRIP: 1.02 (ref 1–1.03)
SP GR UR: 1 (ref 1–1.03)
SP GR UR: 1.02 (ref 1–1.03)
SQUAMOUS #/AREA URNS HPF: ABNORMAL /HPF
STRESS TARGET HR: 111 BPM
TROPONIN T SERPL-MCNC: 0.02 NG/ML (ref 0–0.03)
TSH SERPL DL<=0.005 MIU/L-ACNC: 7.47 UIU/ML (ref 0.45–4.5)
UROBILINOGEN UR QL STRIP: ABNORMAL
UROBILINOGEN UR QL: NORMAL
UROBILINOGEN UR QL: NORMAL
WBC # BLD AUTO: 6.9 X10E3/UL (ref 3.4–10.8)
WBC # UR STRIP: ABNORMAL /HPF
WBC NRBC COR # BLD: 3.7 10*3/MM3 (ref 3.4–10.8)
WBC NRBC COR # BLD: 4.7 10*3/MM3 (ref 3.4–10.8)
WBC NRBC COR # BLD: 5.2 10*3/MM3 (ref 3.4–10.8)
WBC NRBC COR # BLD: 5.2 10*3/MM3 (ref 3.4–10.8)
WBC NRBC COR # BLD: 5.23 10*3/MM3 (ref 3.4–10.8)
WBC NRBC COR # BLD: 5.3 10*3/MM3 (ref 3.4–10.8)
WBC NRBC COR # BLD: 5.5 10*3/MM3 (ref 3.4–10.8)
WBC NRBC COR # BLD: 5.6 10*3/MM3 (ref 3.4–10.8)
WBC NRBC COR # BLD: 5.7 10*3/MM3 (ref 3.4–10.8)
WBC NRBC COR # BLD: 6 10*3/MM3 (ref 3.4–10.8)
WBC NRBC COR # BLD: 6.3 10*3/MM3 (ref 3.4–10.8)
WBC NRBC COR # BLD: 6.3 10*3/MM3 (ref 3.4–10.8)
WBC NRBC COR # BLD: 6.5 10*3/MM3 (ref 3.4–10.8)
WBC NRBC COR # BLD: 6.5 10*3/MM3 (ref 3.4–10.8)
WBC NRBC COR # BLD: 6.6 10*3/MM3 (ref 3.4–10.8)
WBC NRBC COR # BLD: 6.7 10*3/MM3 (ref 3.4–10.8)

## 2022-01-01 PROCEDURE — G0378 HOSPITAL OBSERVATION PER HR: HCPCS

## 2022-01-01 PROCEDURE — 97165 OT EVAL LOW COMPLEX 30 MIN: CPT

## 2022-01-01 PROCEDURE — 85025 COMPLETE CBC W/AUTO DIFF WBC: CPT | Performed by: EMERGENCY MEDICINE

## 2022-01-01 PROCEDURE — 84300 ASSAY OF URINE SODIUM: CPT | Performed by: NURSE PRACTITIONER

## 2022-01-01 PROCEDURE — 83615 LACTATE (LD) (LDH) ENZYME: CPT | Performed by: INTERNAL MEDICINE

## 2022-01-01 PROCEDURE — 80076 HEPATIC FUNCTION PANEL: CPT | Performed by: INTERNAL MEDICINE

## 2022-01-01 PROCEDURE — P9612 CATHETERIZE FOR URINE SPEC: HCPCS

## 2022-01-01 PROCEDURE — 99233 SBSQ HOSP IP/OBS HIGH 50: CPT | Performed by: INTERNAL MEDICINE

## 2022-01-01 PROCEDURE — 99213 OFFICE O/P EST LOW 20 MIN: CPT | Performed by: INTERNAL MEDICINE

## 2022-01-01 PROCEDURE — 99232 SBSQ HOSP IP/OBS MODERATE 35: CPT | Performed by: HOSPITALIST

## 2022-01-01 PROCEDURE — 82550 ASSAY OF CK (CPK): CPT | Performed by: NURSE PRACTITIONER

## 2022-01-01 PROCEDURE — 97116 GAIT TRAINING THERAPY: CPT

## 2022-01-01 PROCEDURE — 80048 BASIC METABOLIC PNL TOTAL CA: CPT | Performed by: HOSPITALIST

## 2022-01-01 PROCEDURE — 80048 BASIC METABOLIC PNL TOTAL CA: CPT | Performed by: NURSE PRACTITIONER

## 2022-01-01 PROCEDURE — 99284 EMERGENCY DEPT VISIT MOD MDM: CPT

## 2022-01-01 PROCEDURE — 36415 COLL VENOUS BLD VENIPUNCTURE: CPT | Performed by: FAMILY MEDICINE

## 2022-01-01 PROCEDURE — 87086 URINE CULTURE/COLONY COUNT: CPT | Performed by: INTERNAL MEDICINE

## 2022-01-01 PROCEDURE — 25010000002 CEFTRIAXONE PER 250 MG: Performed by: NURSE PRACTITIONER

## 2022-01-01 PROCEDURE — 25010000002 CEFTRIAXONE PER 250 MG: Performed by: INTERNAL MEDICINE

## 2022-01-01 PROCEDURE — 87635 SARS-COV-2 COVID-19 AMP PRB: CPT | Performed by: EMERGENCY MEDICINE

## 2022-01-01 PROCEDURE — 80053 COMPREHEN METABOLIC PANEL: CPT | Performed by: INTERNAL MEDICINE

## 2022-01-01 PROCEDURE — 25010000002 CEFTRIAXONE PER 250 MG: Performed by: UROLOGY

## 2022-01-01 PROCEDURE — 85025 COMPLETE CBC W/AUTO DIFF WBC: CPT | Performed by: INTERNAL MEDICINE

## 2022-01-01 PROCEDURE — 99222 1ST HOSP IP/OBS MODERATE 55: CPT | Performed by: INTERNAL MEDICINE

## 2022-01-01 PROCEDURE — 85730 THROMBOPLASTIN TIME PARTIAL: CPT | Performed by: EMERGENCY MEDICINE

## 2022-01-01 PROCEDURE — 87077 CULTURE AEROBIC IDENTIFY: CPT | Performed by: EMERGENCY MEDICINE

## 2022-01-01 PROCEDURE — 85025 COMPLETE CBC W/AUTO DIFF WBC: CPT | Performed by: NURSE PRACTITIONER

## 2022-01-01 PROCEDURE — 99232 SBSQ HOSP IP/OBS MODERATE 35: CPT | Performed by: INTERNAL MEDICINE

## 2022-01-01 PROCEDURE — 51798 US URINE CAPACITY MEASURE: CPT

## 2022-01-01 PROCEDURE — 0 IOHEXOL 300 MG/ML SOLUTION: Performed by: UROLOGY

## 2022-01-01 PROCEDURE — 87102 FUNGUS ISOLATION CULTURE: CPT | Performed by: INTERNAL MEDICINE

## 2022-01-01 PROCEDURE — 93005 ELECTROCARDIOGRAM TRACING: CPT | Performed by: NURSE PRACTITIONER

## 2022-01-01 PROCEDURE — 84484 ASSAY OF TROPONIN QUANT: CPT | Performed by: EMERGENCY MEDICINE

## 2022-01-01 PROCEDURE — 02HV33Z INSERTION OF INFUSION DEVICE INTO SUPERIOR VENA CAVA, PERCUTANEOUS APPROACH: ICD-10-PCS | Performed by: UROLOGY

## 2022-01-01 PROCEDURE — 85025 COMPLETE CBC W/AUTO DIFF WBC: CPT | Performed by: HOSPITALIST

## 2022-01-01 PROCEDURE — 25010000002 PROPOFOL 200 MG/20ML EMULSION: Performed by: STUDENT IN AN ORGANIZED HEALTH CARE EDUCATION/TRAINING PROGRAM

## 2022-01-01 PROCEDURE — 71045 X-RAY EXAM CHEST 1 VIEW: CPT

## 2022-01-01 PROCEDURE — 70450 CT HEAD/BRAIN W/O DYE: CPT

## 2022-01-01 PROCEDURE — 1111F DSCHRG MED/CURRENT MED MERGE: CPT | Performed by: FAMILY MEDICINE

## 2022-01-01 PROCEDURE — 87186 SC STD MICRODIL/AGAR DIL: CPT | Performed by: EMERGENCY MEDICINE

## 2022-01-01 PROCEDURE — 93306 TTE W/DOPPLER COMPLETE: CPT

## 2022-01-01 PROCEDURE — 74176 CT ABD & PELVIS W/O CONTRAST: CPT

## 2022-01-01 PROCEDURE — 87040 BLOOD CULTURE FOR BACTERIA: CPT | Performed by: INTERNAL MEDICINE

## 2022-01-01 PROCEDURE — 25010000002 FUROSEMIDE PER 20 MG: Performed by: HOSPITALIST

## 2022-01-01 PROCEDURE — 99304 1ST NF CARE SF/LOW MDM 25: CPT | Performed by: FAMILY MEDICINE

## 2022-01-01 PROCEDURE — C1751 CATH, INF, PER/CENT/MIDLINE: HCPCS

## 2022-01-01 PROCEDURE — 36410 VNPNXR 3YR/> PHY/QHP DX/THER: CPT

## 2022-01-01 PROCEDURE — 99232 SBSQ HOSP IP/OBS MODERATE 35: CPT | Performed by: NURSE PRACTITIONER

## 2022-01-01 PROCEDURE — 93005 ELECTROCARDIOGRAM TRACING: CPT

## 2022-01-01 PROCEDURE — 93306 TTE W/DOPPLER COMPLETE: CPT | Performed by: INTERNAL MEDICINE

## 2022-01-01 PROCEDURE — 85610 PROTHROMBIN TIME: CPT | Performed by: EMERGENCY MEDICINE

## 2022-01-01 PROCEDURE — 99214 OFFICE O/P EST MOD 30 MIN: CPT | Performed by: INTERNAL MEDICINE

## 2022-01-01 PROCEDURE — 25010000002 CEFTRIAXONE PER 250 MG: Performed by: HOSPITALIST

## 2022-01-01 PROCEDURE — 81001 URINALYSIS AUTO W/SCOPE: CPT | Performed by: EMERGENCY MEDICINE

## 2022-01-01 PROCEDURE — 99219 PR INITIAL OBSERVATION CARE/DAY 50 MINUTES: CPT | Performed by: HOSPITALIST

## 2022-01-01 PROCEDURE — 87086 URINE CULTURE/COLONY COUNT: CPT | Performed by: EMERGENCY MEDICINE

## 2022-01-01 PROCEDURE — 93010 ELECTROCARDIOGRAM REPORT: CPT | Performed by: INTERNAL MEDICINE

## 2022-01-01 PROCEDURE — 87181 SC STD AGAR DILUTION PER AGT: CPT | Performed by: EMERGENCY MEDICINE

## 2022-01-01 PROCEDURE — 36415 COLL VENOUS BLD VENIPUNCTURE: CPT | Performed by: INTERNAL MEDICINE

## 2022-01-01 PROCEDURE — 82550 ASSAY OF CK (CPK): CPT | Performed by: INTERNAL MEDICINE

## 2022-01-01 PROCEDURE — 0 IOPAMIDOL PER 1 ML: Performed by: INTERNAL MEDICINE

## 2022-01-01 PROCEDURE — 99238 HOSP IP/OBS DSCHRG MGMT 30/<: CPT | Performed by: HOSPITALIST

## 2022-01-01 PROCEDURE — 36415 COLL VENOUS BLD VENIPUNCTURE: CPT | Performed by: NURSE PRACTITIONER

## 2022-01-01 PROCEDURE — 80053 COMPREHEN METABOLIC PANEL: CPT | Performed by: EMERGENCY MEDICINE

## 2022-01-01 PROCEDURE — BT141ZZ FLUOROSCOPY OF KIDNEYS, URETERS AND BLADDER USING LOW OSMOLAR CONTRAST: ICD-10-PCS | Performed by: UROLOGY

## 2022-01-01 PROCEDURE — 81001 URINALYSIS AUTO W/SCOPE: CPT | Performed by: NURSE PRACTITIONER

## 2022-01-01 PROCEDURE — 0TBB8ZX EXCISION OF BLADDER, VIA NATURAL OR ARTIFICIAL OPENING ENDOSCOPIC, DIAGNOSTIC: ICD-10-PCS | Performed by: UROLOGY

## 2022-01-01 PROCEDURE — 25010000002 FENTANYL CITRATE (PF) 100 MCG/2ML SOLUTION: Performed by: STUDENT IN AN ORGANIZED HEALTH CARE EDUCATION/TRAINING PROGRAM

## 2022-01-01 PROCEDURE — 99239 HOSP IP/OBS DSCHRG MGMT >30: CPT | Performed by: HOSPITALIST

## 2022-01-01 PROCEDURE — 87186 SC STD MICRODIL/AGAR DIL: CPT | Performed by: INTERNAL MEDICINE

## 2022-01-01 PROCEDURE — 80048 BASIC METABOLIC PNL TOTAL CA: CPT | Performed by: EMERGENCY MEDICINE

## 2022-01-01 PROCEDURE — 71046 X-RAY EXAM CHEST 2 VIEWS: CPT

## 2022-01-01 PROCEDURE — 72070 X-RAY EXAM THORAC SPINE 2VWS: CPT

## 2022-01-01 PROCEDURE — 25010000002 ONDANSETRON PER 1 MG: Performed by: ANESTHESIOLOGIST ASSISTANT

## 2022-01-01 PROCEDURE — 87635 SARS-COV-2 COVID-19 AMP PRB: CPT | Performed by: HOSPITALIST

## 2022-01-01 PROCEDURE — 25010000002 ENOXAPARIN PER 10 MG: Performed by: HOSPITALIST

## 2022-01-01 PROCEDURE — 97162 PT EVAL MOD COMPLEX 30 MIN: CPT

## 2022-01-01 PROCEDURE — 74177 CT ABD & PELVIS W/CONTRAST: CPT

## 2022-01-01 PROCEDURE — 80048 BASIC METABOLIC PNL TOTAL CA: CPT | Performed by: INTERNAL MEDICINE

## 2022-01-01 PROCEDURE — 81002 URINALYSIS NONAUTO W/O SCOPE: CPT | Performed by: FAMILY MEDICINE

## 2022-01-01 PROCEDURE — 80053 COMPREHEN METABOLIC PANEL: CPT | Performed by: NURSE PRACTITIONER

## 2022-01-01 PROCEDURE — 83735 ASSAY OF MAGNESIUM: CPT | Performed by: HOSPITALIST

## 2022-01-01 PROCEDURE — 25010000002 CEFTRIAXONE PER 250 MG: Performed by: EMERGENCY MEDICINE

## 2022-01-01 PROCEDURE — 99285 EMERGENCY DEPT VISIT HI MDM: CPT

## 2022-01-01 PROCEDURE — 87150 DNA/RNA AMPLIFIED PROBE: CPT | Performed by: INTERNAL MEDICINE

## 2022-01-01 PROCEDURE — 87086 URINE CULTURE/COLONY COUNT: CPT | Performed by: NURSE PRACTITIONER

## 2022-01-01 PROCEDURE — 99239 HOSP IP/OBS DSCHRG MGMT >30: CPT | Performed by: INTERNAL MEDICINE

## 2022-01-01 PROCEDURE — 97161 PT EVAL LOW COMPLEX 20 MIN: CPT

## 2022-01-01 PROCEDURE — 82248 BILIRUBIN DIRECT: CPT | Performed by: INTERNAL MEDICINE

## 2022-01-01 PROCEDURE — 83935 ASSAY OF URINE OSMOLALITY: CPT | Performed by: NURSE PRACTITIONER

## 2022-01-01 PROCEDURE — 99214 OFFICE O/P EST MOD 30 MIN: CPT | Performed by: FAMILY MEDICINE

## 2022-01-01 PROCEDURE — 99496 TRANSJ CARE MGMT HIGH F2F 7D: CPT | Performed by: FAMILY MEDICINE

## 2022-01-01 PROCEDURE — 36415 COLL VENOUS BLD VENIPUNCTURE: CPT

## 2022-01-01 PROCEDURE — 93005 ELECTROCARDIOGRAM TRACING: CPT | Performed by: EMERGENCY MEDICINE

## 2022-01-01 PROCEDURE — 25010000002 DAPTOMYCIN PER 1 MG: Performed by: NURSE PRACTITIONER

## 2022-01-01 PROCEDURE — 97166 OT EVAL MOD COMPLEX 45 MIN: CPT

## 2022-01-01 PROCEDURE — 87040 BLOOD CULTURE FOR BACTERIA: CPT | Performed by: NURSE PRACTITIONER

## 2022-01-01 PROCEDURE — 87088 URINE BACTERIA CULTURE: CPT | Performed by: INTERNAL MEDICINE

## 2022-01-01 PROCEDURE — C1758 CATHETER, URETERAL: HCPCS | Performed by: UROLOGY

## 2022-01-01 PROCEDURE — 93000 ELECTROCARDIOGRAM COMPLETE: CPT | Performed by: INTERNAL MEDICINE

## 2022-01-01 PROCEDURE — 97535 SELF CARE MNGMENT TRAINING: CPT

## 2022-01-01 PROCEDURE — 81001 URINALYSIS AUTO W/SCOPE: CPT | Performed by: INTERNAL MEDICINE

## 2022-01-01 PROCEDURE — 93005 ELECTROCARDIOGRAM TRACING: CPT | Performed by: HOSPITALIST

## 2022-01-01 PROCEDURE — 82550 ASSAY OF CK (CPK): CPT | Performed by: EMERGENCY MEDICINE

## 2022-01-01 PROCEDURE — 88305 TISSUE EXAM BY PATHOLOGIST: CPT | Performed by: UROLOGY

## 2022-01-01 RX ORDER — SULFAMETHOXAZOLE AND TRIMETHOPRIM 800; 160 MG/1; MG/1
1 TABLET ORAL 2 TIMES DAILY
Qty: 14 TABLET | Refills: 0 | Status: SHIPPED | OUTPATIENT
Start: 2022-01-01 | End: 2022-01-01

## 2022-01-01 RX ORDER — ACETAMINOPHEN 325 MG/1
650 TABLET ORAL EVERY 4 HOURS PRN
Status: DISCONTINUED | OUTPATIENT
Start: 2022-01-01 | End: 2022-01-01 | Stop reason: HOSPADM

## 2022-01-01 RX ORDER — ATORVASTATIN CALCIUM 10 MG/1
10 TABLET, FILM COATED ORAL NIGHTLY
Status: DISCONTINUED | OUTPATIENT
Start: 2022-01-01 | End: 2022-01-01 | Stop reason: HOSPADM

## 2022-01-01 RX ORDER — SODIUM CHLORIDE 0.9 % (FLUSH) 0.9 %
10 SYRINGE (ML) INJECTION AS NEEDED
Status: DISCONTINUED | OUTPATIENT
Start: 2022-01-01 | End: 2022-01-01 | Stop reason: HOSPADM

## 2022-01-01 RX ORDER — SODIUM CHLORIDE 0.9 % (FLUSH) 0.9 %
3 SYRINGE (ML) INJECTION EVERY 12 HOURS SCHEDULED
Status: DISCONTINUED | OUTPATIENT
Start: 2022-01-01 | End: 2022-01-01 | Stop reason: HOSPADM

## 2022-01-01 RX ORDER — ATORVASTATIN CALCIUM 20 MG/1
20 TABLET, FILM COATED ORAL DAILY
Status: DISCONTINUED | OUTPATIENT
Start: 2022-01-01 | End: 2022-01-01 | Stop reason: HOSPADM

## 2022-01-01 RX ORDER — CEFDINIR 300 MG/1
300 CAPSULE ORAL 2 TIMES DAILY
Qty: 10 CAPSULE | Refills: 0 | Status: SHIPPED | OUTPATIENT
Start: 2022-01-01 | End: 2022-01-01

## 2022-01-01 RX ORDER — SODIUM CHLORIDE 0.9 % (FLUSH) 0.9 %
20 SYRINGE (ML) INJECTION AS NEEDED
Status: DISCONTINUED | OUTPATIENT
Start: 2022-01-01 | End: 2022-01-01 | Stop reason: HOSPADM

## 2022-01-01 RX ORDER — CALCIUM CARBONATE 200(500)MG
2 TABLET,CHEWABLE ORAL 2 TIMES DAILY PRN
Status: DISCONTINUED | OUTPATIENT
Start: 2022-01-01 | End: 2022-01-01 | Stop reason: HOSPADM

## 2022-01-01 RX ORDER — DEXAMETHASONE SODIUM PHOSPHATE 4 MG/ML
4 INJECTION, SOLUTION INTRA-ARTICULAR; INTRALESIONAL; INTRAMUSCULAR; INTRAVENOUS; SOFT TISSUE DAILY
Status: DISCONTINUED | OUTPATIENT
Start: 2022-01-01 | End: 2022-01-01

## 2022-01-01 RX ORDER — ACETAMINOPHEN 160 MG/5ML
650 SOLUTION ORAL EVERY 4 HOURS PRN
Status: DISCONTINUED | OUTPATIENT
Start: 2022-01-01 | End: 2022-01-01 | Stop reason: HOSPADM

## 2022-01-01 RX ORDER — ACETAMINOPHEN 650 MG/1
650 SUPPOSITORY RECTAL EVERY 4 HOURS PRN
Status: DISCONTINUED | OUTPATIENT
Start: 2022-01-01 | End: 2022-01-01 | Stop reason: HOSPADM

## 2022-01-01 RX ORDER — ATORVASTATIN CALCIUM 20 MG/1
20 TABLET, FILM COATED ORAL NIGHTLY
Status: DISCONTINUED | OUTPATIENT
Start: 2022-01-01 | End: 2022-01-01

## 2022-01-01 RX ORDER — BISACODYL 10 MG
10 SUPPOSITORY, RECTAL RECTAL DAILY PRN
Status: DISCONTINUED | OUTPATIENT
Start: 2022-01-01 | End: 2022-01-01 | Stop reason: HOSPADM

## 2022-01-01 RX ORDER — ONDANSETRON 2 MG/ML
4 INJECTION INTRAMUSCULAR; INTRAVENOUS EVERY 6 HOURS PRN
Status: DISCONTINUED | OUTPATIENT
Start: 2022-01-01 | End: 2022-01-01 | Stop reason: HOSPADM

## 2022-01-01 RX ORDER — SODIUM PHOSPHATE, DIBASIC AND SODIUM PHOSPHATE, MONOBASIC 7; 19 G/133ML; G/133ML
1 ENEMA RECTAL DAILY PRN
COMMUNITY
End: 2022-01-01 | Stop reason: HOSPADM

## 2022-01-01 RX ORDER — PANTOPRAZOLE SODIUM 40 MG/1
40 TABLET, DELAYED RELEASE ORAL
Status: DISCONTINUED | OUTPATIENT
Start: 2022-01-01 | End: 2022-01-01 | Stop reason: HOSPADM

## 2022-01-01 RX ORDER — SIMVASTATIN 40 MG
40 TABLET ORAL NIGHTLY
COMMUNITY
End: 2022-01-01 | Stop reason: HOSPADM

## 2022-01-01 RX ORDER — SODIUM CHLORIDE 9 MG/ML
50 INJECTION, SOLUTION INTRAVENOUS CONTINUOUS
Status: DISCONTINUED | OUTPATIENT
Start: 2022-01-01 | End: 2022-01-01 | Stop reason: HOSPADM

## 2022-01-01 RX ORDER — SODIUM CHLORIDE 9 MG/ML
50 INJECTION, SOLUTION INTRAVENOUS CONTINUOUS
Status: DISCONTINUED | OUTPATIENT
Start: 2022-01-01 | End: 2022-01-01

## 2022-01-01 RX ORDER — NITROGLYCERIN 0.4 MG/1
0.4 TABLET SUBLINGUAL
Status: DISCONTINUED | OUTPATIENT
Start: 2022-01-01 | End: 2022-01-01 | Stop reason: HOSPADM

## 2022-01-01 RX ORDER — LEVOTHYROXINE SODIUM 0.05 MG/1
50 TABLET ORAL DAILY
Qty: 60 TABLET | Refills: 0 | Status: SHIPPED | OUTPATIENT
Start: 2022-01-01 | End: 2022-01-01

## 2022-01-01 RX ORDER — LINEZOLID 600 MG/1
600 TABLET, FILM COATED ORAL EVERY 12 HOURS SCHEDULED
Status: DISCONTINUED | OUTPATIENT
Start: 2022-01-01 | End: 2022-01-01

## 2022-01-01 RX ORDER — SODIUM CHLORIDE 0.9 % (FLUSH) 0.9 %
3-10 SYRINGE (ML) INJECTION AS NEEDED
Status: DISCONTINUED | OUTPATIENT
Start: 2022-01-01 | End: 2022-01-01 | Stop reason: HOSPADM

## 2022-01-01 RX ORDER — ONDANSETRON 4 MG/1
4 TABLET, FILM COATED ORAL EVERY 6 HOURS PRN
Status: DISCONTINUED | OUTPATIENT
Start: 2022-01-01 | End: 2022-01-01 | Stop reason: HOSPADM

## 2022-01-01 RX ORDER — SODIUM CHLORIDE 0.9 % (FLUSH) 0.9 %
10 SYRINGE (ML) INJECTION EVERY 12 HOURS SCHEDULED
Status: DISCONTINUED | OUTPATIENT
Start: 2022-01-01 | End: 2022-01-01 | Stop reason: HOSPADM

## 2022-01-01 RX ORDER — MULTIPLE VITAMINS W/ MINERALS TAB 9MG-400MCG
1 TAB ORAL DAILY
Status: DISCONTINUED | OUTPATIENT
Start: 2022-01-01 | End: 2022-01-01 | Stop reason: HOSPADM

## 2022-01-01 RX ORDER — AMOXICILLIN AND CLAVULANATE POTASSIUM 875; 125 MG/1; MG/1
1 TABLET, FILM COATED ORAL EVERY 12 HOURS SCHEDULED
Qty: 8 TABLET | Refills: 0 | Status: SHIPPED | OUTPATIENT
Start: 2022-01-01 | End: 2022-01-01 | Stop reason: HOSPADM

## 2022-01-01 RX ORDER — LEVOTHYROXINE SODIUM 0.05 MG/1
50 TABLET ORAL DAILY
Status: DISCONTINUED | OUTPATIENT
Start: 2022-01-01 | End: 2022-01-01 | Stop reason: HOSPADM

## 2022-01-01 RX ORDER — CEFDINIR 300 MG/1
300 CAPSULE ORAL 2 TIMES DAILY
Qty: 10 CAPSULE | Refills: 0 | Status: SHIPPED | OUTPATIENT
Start: 2022-01-01 | End: 2022-01-01 | Stop reason: SDUPTHER

## 2022-01-01 RX ORDER — LEVOTHYROXINE SODIUM 0.05 MG/1
50 TABLET ORAL DAILY
Qty: 30 TABLET | Refills: 0 | Status: SHIPPED | OUTPATIENT
Start: 2022-01-01 | End: 2022-01-01

## 2022-01-01 RX ORDER — LEVOTHYROXINE SODIUM 0.05 MG/1
50 TABLET ORAL
COMMUNITY

## 2022-01-01 RX ORDER — LINEZOLID 600 MG/1
600 TABLET, FILM COATED ORAL 2 TIMES DAILY
Status: CANCELLED | OUTPATIENT
Start: 2022-01-01 | End: 2022-01-01

## 2022-01-01 RX ORDER — IPRATROPIUM BROMIDE AND ALBUTEROL SULFATE 2.5; .5 MG/3ML; MG/3ML
3 SOLUTION RESPIRATORY (INHALATION) EVERY 4 HOURS PRN
Status: DISCONTINUED | OUTPATIENT
Start: 2022-01-01 | End: 2022-01-01 | Stop reason: HOSPADM

## 2022-01-01 RX ORDER — LIDOCAINE HYDROCHLORIDE 10 MG/ML
INJECTION, SOLUTION EPIDURAL; INFILTRATION; INTRACAUDAL; PERINEURAL AS NEEDED
Status: DISCONTINUED | OUTPATIENT
Start: 2022-01-01 | End: 2022-01-01 | Stop reason: SURG

## 2022-01-01 RX ORDER — SODIUM CHLORIDE, SODIUM LACTATE, POTASSIUM CHLORIDE, CALCIUM CHLORIDE 600; 310; 30; 20 MG/100ML; MG/100ML; MG/100ML; MG/100ML
1000 INJECTION, SOLUTION INTRAVENOUS CONTINUOUS
Status: DISCONTINUED | OUTPATIENT
Start: 2022-01-01 | End: 2022-01-01 | Stop reason: HOSPADM

## 2022-01-01 RX ORDER — LEVOTHYROXINE SODIUM 0.05 MG/1
50 TABLET ORAL
Status: DISCONTINUED | OUTPATIENT
Start: 2022-01-01 | End: 2022-01-01 | Stop reason: HOSPADM

## 2022-01-01 RX ORDER — SODIUM CHLORIDE 0.9 % (FLUSH) 0.9 %
20 SYRINGE (ML) INJECTION AS NEEDED
Status: CANCELLED | OUTPATIENT
Start: 2022-01-01

## 2022-01-01 RX ORDER — CHOLECALCIFEROL (VITAMIN D3) 125 MCG
5 CAPSULE ORAL NIGHTLY PRN
Status: DISCONTINUED | OUTPATIENT
Start: 2022-01-01 | End: 2022-01-01 | Stop reason: HOSPADM

## 2022-01-01 RX ORDER — DEXAMETHASONE SODIUM PHOSPHATE 4 MG/ML
10 INJECTION, SOLUTION INTRA-ARTICULAR; INTRALESIONAL; INTRAMUSCULAR; INTRAVENOUS; SOFT TISSUE ONCE
Status: DISCONTINUED | OUTPATIENT
Start: 2022-01-01 | End: 2022-01-01

## 2022-01-01 RX ORDER — IPRATROPIUM BROMIDE AND ALBUTEROL SULFATE 2.5; .5 MG/3ML; MG/3ML
3 SOLUTION RESPIRATORY (INHALATION) EVERY 4 HOURS PRN
COMMUNITY
End: 2022-01-01

## 2022-01-01 RX ORDER — OMEPRAZOLE 20 MG/1
20 CAPSULE, DELAYED RELEASE ORAL EVERY MORNING
COMMUNITY

## 2022-01-01 RX ORDER — NITROGLYCERIN 0.4 MG/1
0.4 TABLET SUBLINGUAL
COMMUNITY

## 2022-01-01 RX ORDER — SODIUM CHLORIDE 0.9 % (FLUSH) 0.9 %
10 SYRINGE (ML) INJECTION AS NEEDED
Status: CANCELLED | OUTPATIENT
Start: 2022-01-01

## 2022-01-01 RX ORDER — RIVAROXABAN 15 MG/1
TABLET, FILM COATED ORAL
Qty: 30 TABLET | Refills: 1 | Status: SHIPPED | OUTPATIENT
Start: 2022-01-01 | End: 2022-01-01

## 2022-01-01 RX ORDER — SODIUM PHOSPHATE, DIBASIC AND SODIUM PHOSPHATE, MONOBASIC 7; 19 G/133ML; G/133ML
1 ENEMA RECTAL DAILY PRN
Status: CANCELLED | OUTPATIENT
Start: 2022-01-01

## 2022-01-01 RX ORDER — ALUMINA, MAGNESIA, AND SIMETHICONE 2400; 2400; 240 MG/30ML; MG/30ML; MG/30ML
15 SUSPENSION ORAL EVERY 6 HOURS PRN
Status: DISCONTINUED | OUTPATIENT
Start: 2022-01-01 | End: 2022-01-01 | Stop reason: HOSPADM

## 2022-01-01 RX ORDER — POLYETHYLENE GLYCOL 3350 17 G/17G
17 POWDER, FOR SOLUTION ORAL DAILY PRN
Status: DISCONTINUED | OUTPATIENT
Start: 2022-01-01 | End: 2022-01-01 | Stop reason: HOSPADM

## 2022-01-01 RX ORDER — PROPOFOL 10 MG/ML
INJECTION, EMULSION INTRAVENOUS AS NEEDED
Status: DISCONTINUED | OUTPATIENT
Start: 2022-01-01 | End: 2022-01-01 | Stop reason: SURG

## 2022-01-01 RX ORDER — LIDOCAINE HYDROCHLORIDE 10 MG/ML
0.5 INJECTION, SOLUTION INFILTRATION; PERINEURAL ONCE AS NEEDED
Status: DISCONTINUED | OUTPATIENT
Start: 2022-01-01 | End: 2022-01-01 | Stop reason: HOSPADM

## 2022-01-01 RX ORDER — IPRATROPIUM BROMIDE AND ALBUTEROL SULFATE 2.5; .5 MG/3ML; MG/3ML
3 SOLUTION RESPIRATORY (INHALATION) EVERY 4 HOURS PRN
COMMUNITY

## 2022-01-01 RX ORDER — LINEZOLID 600 MG/1
600 TABLET, FILM COATED ORAL 2 TIMES DAILY
COMMUNITY
Start: 2022-01-01 | End: 2022-01-01 | Stop reason: HOSPADM

## 2022-01-01 RX ORDER — FENTANYL CITRATE 50 UG/ML
INJECTION, SOLUTION INTRAMUSCULAR; INTRAVENOUS AS NEEDED
Status: DISCONTINUED | OUTPATIENT
Start: 2022-01-01 | End: 2022-01-01 | Stop reason: SURG

## 2022-01-01 RX ORDER — MULTIPLE VITAMINS W/ MINERALS TAB 9MG-400MCG
1 TAB ORAL DAILY
COMMUNITY

## 2022-01-01 RX ORDER — IPRATROPIUM BROMIDE AND ALBUTEROL SULFATE 2.5; .5 MG/3ML; MG/3ML
3 SOLUTION RESPIRATORY (INHALATION) EVERY 4 HOURS PRN
Status: CANCELLED | OUTPATIENT
Start: 2022-01-01

## 2022-01-01 RX ORDER — SODIUM CHLORIDE 0.9 % (FLUSH) 0.9 %
10 SYRINGE (ML) INJECTION EVERY 12 HOURS SCHEDULED
Status: CANCELLED | OUTPATIENT
Start: 2022-01-01

## 2022-01-01 RX ORDER — PANTOPRAZOLE SODIUM 40 MG/1
40 TABLET, DELAYED RELEASE ORAL EVERY MORNING
Status: DISCONTINUED | OUTPATIENT
Start: 2022-01-01 | End: 2022-01-01 | Stop reason: HOSPADM

## 2022-01-01 RX ORDER — BISACODYL 5 MG/1
5 TABLET, DELAYED RELEASE ORAL DAILY PRN
Status: DISCONTINUED | OUTPATIENT
Start: 2022-01-01 | End: 2022-01-01 | Stop reason: HOSPADM

## 2022-01-01 RX ORDER — ONDANSETRON 2 MG/ML
INJECTION INTRAMUSCULAR; INTRAVENOUS AS NEEDED
Status: DISCONTINUED | OUTPATIENT
Start: 2022-01-01 | End: 2022-01-01 | Stop reason: SURG

## 2022-01-01 RX ORDER — EPHEDRINE SULFATE 5 MG/ML
INJECTION INTRAVENOUS AS NEEDED
Status: DISCONTINUED | OUTPATIENT
Start: 2022-01-01 | End: 2022-01-01 | Stop reason: SURG

## 2022-01-01 RX ORDER — AMOXICILLIN AND CLAVULANATE POTASSIUM 875; 125 MG/1; MG/1
1 TABLET, FILM COATED ORAL EVERY 12 HOURS SCHEDULED
Status: DISCONTINUED | OUTPATIENT
Start: 2022-01-01 | End: 2022-01-01

## 2022-01-01 RX ORDER — FUROSEMIDE 10 MG/ML
20 INJECTION INTRAMUSCULAR; INTRAVENOUS ONCE
Status: COMPLETED | OUTPATIENT
Start: 2022-01-01 | End: 2022-01-01

## 2022-01-01 RX ORDER — SACCHAROMYCES BOULARDII 250 MG
250 CAPSULE ORAL 2 TIMES DAILY
Qty: 60 CAPSULE | Refills: 0 | Status: SHIPPED | OUTPATIENT
Start: 2022-01-01 | End: 2022-01-01

## 2022-01-01 RX ORDER — BISACODYL 10 MG
10 SUPPOSITORY, RECTAL RECTAL DAILY PRN
COMMUNITY

## 2022-01-01 RX ORDER — LANSOPRAZOLE
30 KIT EVERY MORNING
Status: DISCONTINUED | OUTPATIENT
Start: 2022-01-01 | End: 2022-01-01 | Stop reason: HOSPADM

## 2022-01-01 RX ORDER — ATORVASTATIN CALCIUM 10 MG/1
10 TABLET, FILM COATED ORAL DAILY
Status: DISCONTINUED | OUTPATIENT
Start: 2022-01-01 | End: 2022-01-01 | Stop reason: HOSPADM

## 2022-01-01 RX ORDER — TRAMADOL HYDROCHLORIDE 50 MG/1
50 TABLET ORAL EVERY 6 HOURS PRN
Status: DISCONTINUED | OUTPATIENT
Start: 2022-01-01 | End: 2022-01-01

## 2022-01-01 RX ORDER — SODIUM CHLORIDE 9 MG/ML
75 INJECTION, SOLUTION INTRAVENOUS CONTINUOUS
Status: DISCONTINUED | OUTPATIENT
Start: 2022-01-01 | End: 2022-01-01

## 2022-01-01 RX ORDER — PANTOPRAZOLE SODIUM 40 MG/1
40 TABLET, DELAYED RELEASE ORAL EVERY MORNING
Status: DISCONTINUED | OUTPATIENT
Start: 2022-01-01 | End: 2022-01-01

## 2022-01-01 RX ORDER — CEFDINIR 300 MG/1
300 CAPSULE ORAL 2 TIMES DAILY
Qty: 10 CAPSULE | Refills: 0 | Status: ON HOLD | OUTPATIENT
Start: 2022-01-01 | End: 2022-01-01

## 2022-01-01 RX ORDER — SACCHAROMYCES BOULARDII 250 MG
250 CAPSULE ORAL 2 TIMES DAILY
Status: DISCONTINUED | OUTPATIENT
Start: 2022-01-01 | End: 2022-01-01 | Stop reason: HOSPADM

## 2022-01-01 RX ADMIN — SODIUM CHLORIDE 75 ML/HR: 9 INJECTION, SOLUTION INTRAVENOUS at 23:43

## 2022-01-01 RX ADMIN — PANTOPRAZOLE SODIUM 40 MG: 40 TABLET, DELAYED RELEASE ORAL at 04:52

## 2022-01-01 RX ADMIN — Medication 250 MG: at 08:35

## 2022-01-01 RX ADMIN — LEVOTHYROXINE SODIUM 50 MCG: 0.05 TABLET ORAL at 05:25

## 2022-01-01 RX ADMIN — Medication 250 MG: at 20:14

## 2022-01-01 RX ADMIN — Medication 3 ML: at 09:51

## 2022-01-01 RX ADMIN — PANTOPRAZOLE SODIUM 40 MG: 40 TABLET, DELAYED RELEASE ORAL at 05:35

## 2022-01-01 RX ADMIN — Medication 250 MG: at 08:22

## 2022-01-01 RX ADMIN — LEVOTHYROXINE SODIUM 50 MCG: 0.05 TABLET ORAL at 05:12

## 2022-01-01 RX ADMIN — SODIUM CHLORIDE 250 MG: 9 INJECTION, SOLUTION INTRAVENOUS at 16:42

## 2022-01-01 RX ADMIN — CEFTRIAXONE 2 G: 2 INJECTION, POWDER, FOR SOLUTION INTRAMUSCULAR; INTRAVENOUS at 16:42

## 2022-01-01 RX ADMIN — Medication 250 MG: at 09:29

## 2022-01-01 RX ADMIN — LEVOTHYROXINE SODIUM 50 MCG: 0.05 TABLET ORAL at 04:51

## 2022-01-01 RX ADMIN — Medication 10 ML: at 15:21

## 2022-01-01 RX ADMIN — SODIUM CHLORIDE 75 ML/HR: 9 INJECTION, SOLUTION INTRAVENOUS at 00:08

## 2022-01-01 RX ADMIN — PANTOPRAZOLE SODIUM 40 MG: 40 TABLET, DELAYED RELEASE ORAL at 05:59

## 2022-01-01 RX ADMIN — SODIUM CHLORIDE 75 ML/HR: 9 INJECTION, SOLUTION INTRAVENOUS at 18:23

## 2022-01-01 RX ADMIN — SODIUM CHLORIDE, POTASSIUM CHLORIDE, SODIUM LACTATE AND CALCIUM CHLORIDE 1000 ML: 600; 310; 30; 20 INJECTION, SOLUTION INTRAVENOUS at 14:52

## 2022-01-01 RX ADMIN — PANTOPRAZOLE SODIUM 40 MG: 40 TABLET, DELAYED RELEASE ORAL at 09:37

## 2022-01-01 RX ADMIN — PROPOFOL 30 MG: 10 INJECTION, EMULSION INTRAVENOUS at 16:34

## 2022-01-01 RX ADMIN — SODIUM CHLORIDE 500 ML: 9 INJECTION, SOLUTION INTRAVENOUS at 20:46

## 2022-01-01 RX ADMIN — ATORVASTATIN CALCIUM 20 MG: 20 TABLET, FILM COATED ORAL at 20:23

## 2022-01-01 RX ADMIN — Medication 10 ML: at 18:24

## 2022-01-01 RX ADMIN — ENOXAPARIN SODIUM 50 MG: 60 INJECTION SUBCUTANEOUS at 09:37

## 2022-01-01 RX ADMIN — SODIUM CHLORIDE 50 ML/HR: 9 INJECTION, SOLUTION INTRAVENOUS at 15:23

## 2022-01-01 RX ADMIN — CEFTRIAXONE 1 G: 1 INJECTION, POWDER, FOR SOLUTION INTRAMUSCULAR; INTRAVENOUS at 05:25

## 2022-01-01 RX ADMIN — LINEZOLID 600 MG: 600 TABLET, FILM COATED ORAL at 08:24

## 2022-01-01 RX ADMIN — PANTOPRAZOLE SODIUM 40 MG: 40 TABLET, DELAYED RELEASE ORAL at 05:12

## 2022-01-01 RX ADMIN — EPHEDRINE SULFATE 5 MG: 5 INJECTION INTRAVENOUS at 16:17

## 2022-01-01 RX ADMIN — CEFTRIAXONE 1 G: 10 INJECTION, POWDER, FOR SOLUTION INTRAVENOUS at 18:24

## 2022-01-01 RX ADMIN — PANTOPRAZOLE SODIUM 40 MG: 40 TABLET, DELAYED RELEASE ORAL at 05:26

## 2022-01-01 RX ADMIN — CEFTRIAXONE 1 G: 1 INJECTION, POWDER, FOR SOLUTION INTRAMUSCULAR; INTRAVENOUS at 04:51

## 2022-01-01 RX ADMIN — Medication 10 ML: at 16:10

## 2022-01-01 RX ADMIN — Medication 10 ML: at 20:12

## 2022-01-01 RX ADMIN — LINEZOLID 600 MG: 600 TABLET, FILM COATED ORAL at 13:52

## 2022-01-01 RX ADMIN — EPHEDRINE SULFATE 10 MG: 5 INJECTION INTRAVENOUS at 16:18

## 2022-01-01 RX ADMIN — ATORVASTATIN CALCIUM 10 MG: 10 TABLET, FILM COATED ORAL at 20:03

## 2022-01-01 RX ADMIN — Medication 3 ML: at 23:37

## 2022-01-01 RX ADMIN — LINEZOLID 600 MG: 600 TABLET, FILM COATED ORAL at 08:32

## 2022-01-01 RX ADMIN — Medication 250 MG: at 20:23

## 2022-01-01 RX ADMIN — Medication 10 ML: at 20:06

## 2022-01-01 RX ADMIN — SODIUM CHLORIDE 50 ML/HR: 9 INJECTION, SOLUTION INTRAVENOUS at 11:16

## 2022-01-01 RX ADMIN — FUROSEMIDE 20 MG: 10 INJECTION, SOLUTION INTRAMUSCULAR; INTRAVENOUS at 10:06

## 2022-01-01 RX ADMIN — Medication 10 ML: at 20:32

## 2022-01-01 RX ADMIN — Medication 10 ML: at 20:14

## 2022-01-01 RX ADMIN — FENTANYL CITRATE 25 MCG: 50 INJECTION, SOLUTION INTRAMUSCULAR; INTRAVENOUS at 16:11

## 2022-01-01 RX ADMIN — PANTOPRAZOLE SODIUM 40 MG: 40 TABLET, DELAYED RELEASE ORAL at 05:45

## 2022-01-01 RX ADMIN — PANTOPRAZOLE SODIUM 40 MG: 40 TABLET, DELAYED RELEASE ORAL at 08:48

## 2022-01-01 RX ADMIN — PROPOFOL 80 MG: 10 INJECTION, EMULSION INTRAVENOUS at 16:13

## 2022-01-01 RX ADMIN — MULTIPLE VITAMINS W/ MINERALS TAB 1 TABLET: TAB at 08:35

## 2022-01-01 RX ADMIN — LEVOTHYROXINE SODIUM 50 MCG: 0.05 TABLET ORAL at 09:29

## 2022-01-01 RX ADMIN — LINEZOLID 600 MG: 600 TABLET, FILM COATED ORAL at 20:32

## 2022-01-01 RX ADMIN — Medication 10 ML: at 07:35

## 2022-01-01 RX ADMIN — RIVAROXABAN 15 MG: 15 TABLET, FILM COATED ORAL at 18:42

## 2022-01-01 RX ADMIN — ATORVASTATIN CALCIUM 10 MG: 10 TABLET, FILM COATED ORAL at 08:24

## 2022-01-01 RX ADMIN — RIVAROXABAN 15 MG: 15 TABLET, FILM COATED ORAL at 21:10

## 2022-01-01 RX ADMIN — AMOXICILLIN AND CLAVULANATE POTASSIUM 1 TABLET: 875; 125 TABLET, FILM COATED ORAL at 20:15

## 2022-01-01 RX ADMIN — Medication 10 ML: at 20:03

## 2022-01-01 RX ADMIN — Medication 10 ML: at 00:15

## 2022-01-01 RX ADMIN — CEFTRIAXONE 2 G: 2 INJECTION, POWDER, FOR SOLUTION INTRAMUSCULAR; INTRAVENOUS at 18:23

## 2022-01-01 RX ADMIN — SODIUM CHLORIDE 50 ML/HR: 9 INJECTION, SOLUTION INTRAVENOUS at 16:37

## 2022-01-01 RX ADMIN — ATORVASTATIN CALCIUM 10 MG: 10 TABLET, FILM COATED ORAL at 20:17

## 2022-01-01 RX ADMIN — Medication 10 ML: at 10:05

## 2022-01-01 RX ADMIN — CEFTRIAXONE 1 G: 1 INJECTION, POWDER, FOR SOLUTION INTRAMUSCULAR; INTRAVENOUS at 00:15

## 2022-01-01 RX ADMIN — CEFTRIAXONE 2 G: 2 INJECTION, POWDER, FOR SOLUTION INTRAMUSCULAR; INTRAVENOUS at 17:52

## 2022-01-01 RX ADMIN — Medication 3 ML: at 20:23

## 2022-01-01 RX ADMIN — SODIUM CHLORIDE 75 ML/HR: 9 INJECTION, SOLUTION INTRAVENOUS at 10:24

## 2022-01-01 RX ADMIN — LEVOTHYROXINE SODIUM 50 MCG: 0.05 TABLET ORAL at 05:59

## 2022-01-01 RX ADMIN — LEVOTHYROXINE SODIUM 50 MCG: 0.05 TABLET ORAL at 10:16

## 2022-01-01 RX ADMIN — SODIUM CHLORIDE 50 ML/HR: 9 INJECTION, SOLUTION INTRAVENOUS at 20:14

## 2022-01-01 RX ADMIN — LEVOTHYROXINE SODIUM 50 MCG: 0.05 TABLET ORAL at 05:26

## 2022-01-01 RX ADMIN — CEFTRIAXONE 2 G: 2 INJECTION, POWDER, FOR SOLUTION INTRAMUSCULAR; INTRAVENOUS at 19:56

## 2022-01-01 RX ADMIN — ENOXAPARIN SODIUM 50 MG: 60 INJECTION SUBCUTANEOUS at 12:48

## 2022-01-01 RX ADMIN — ATORVASTATIN CALCIUM 20 MG: 20 TABLET, FILM COATED ORAL at 20:16

## 2022-01-01 RX ADMIN — LEVOTHYROXINE SODIUM 50 MCG: 0.05 TABLET ORAL at 06:25

## 2022-01-01 RX ADMIN — Medication 10 ML: at 08:22

## 2022-01-01 RX ADMIN — Medication 10 ML: at 10:23

## 2022-01-01 RX ADMIN — PSYLLIUM HUSK 1 PACKET: 3.4 POWDER ORAL at 08:39

## 2022-01-01 RX ADMIN — ATORVASTATIN CALCIUM 20 MG: 20 TABLET, FILM COATED ORAL at 09:37

## 2022-01-01 RX ADMIN — SODIUM CHLORIDE 500 ML: 9 INJECTION, SOLUTION INTRAVENOUS at 16:10

## 2022-01-01 RX ADMIN — LINEZOLID 600 MG: 600 TABLET, FILM COATED ORAL at 20:05

## 2022-01-01 RX ADMIN — Medication 10 ML: at 21:11

## 2022-01-01 RX ADMIN — ATORVASTATIN CALCIUM 10 MG: 10 TABLET, FILM COATED ORAL at 21:10

## 2022-01-01 RX ADMIN — MULTIPLE VITAMINS W/ MINERALS TAB 1 TABLET: TAB at 12:48

## 2022-01-01 RX ADMIN — ATORVASTATIN CALCIUM 10 MG: 10 TABLET, FILM COATED ORAL at 20:23

## 2022-01-01 RX ADMIN — Medication 3 ML: at 10:20

## 2022-01-01 RX ADMIN — Medication 10 ML: at 08:00

## 2022-01-01 RX ADMIN — PANTOPRAZOLE SODIUM 40 MG: 40 TABLET, DELAYED RELEASE ORAL at 06:25

## 2022-01-01 RX ADMIN — ATORVASTATIN CALCIUM 20 MG: 20 TABLET, FILM COATED ORAL at 12:48

## 2022-01-01 RX ADMIN — ATORVASTATIN CALCIUM 10 MG: 10 TABLET, FILM COATED ORAL at 08:32

## 2022-01-01 RX ADMIN — ATORVASTATIN CALCIUM 10 MG: 10 TABLET, FILM COATED ORAL at 00:15

## 2022-01-01 RX ADMIN — RIVAROXABAN 15 MG: 15 TABLET, FILM COATED ORAL at 00:15

## 2022-01-01 RX ADMIN — Medication 10 ML: at 20:16

## 2022-01-01 RX ADMIN — LEVOTHYROXINE SODIUM 50 MCG: 0.05 TABLET ORAL at 08:32

## 2022-01-01 RX ADMIN — MULTIPLE VITAMINS W/ MINERALS TAB 1 TABLET: TAB at 09:37

## 2022-01-01 RX ADMIN — Medication 10 ML: at 09:29

## 2022-01-01 RX ADMIN — LEVOTHYROXINE SODIUM 50 MCG: 0.05 TABLET ORAL at 08:24

## 2022-01-01 RX ADMIN — Medication 10 ML: at 08:35

## 2022-01-01 RX ADMIN — LEVOTHYROXINE SODIUM 50 MCG: 0.05 TABLET ORAL at 05:45

## 2022-01-01 RX ADMIN — SODIUM CHLORIDE 75 ML/HR: 9 INJECTION, SOLUTION INTRAVENOUS at 01:28

## 2022-01-01 RX ADMIN — IOPAMIDOL 100 ML: 755 INJECTION, SOLUTION INTRAVENOUS at 13:37

## 2022-01-01 RX ADMIN — CEFTRIAXONE 2 G: 2 INJECTION, POWDER, FOR SOLUTION INTRAMUSCULAR; INTRAVENOUS at 18:47

## 2022-01-01 RX ADMIN — AMOXICILLIN AND CLAVULANATE POTASSIUM 1 TABLET: 875; 125 TABLET, FILM COATED ORAL at 08:22

## 2022-01-01 RX ADMIN — PANTOPRAZOLE SODIUM 40 MG: 40 TABLET, DELAYED RELEASE ORAL at 05:25

## 2022-01-01 RX ADMIN — MULTIPLE VITAMINS W/ MINERALS TAB 1 TABLET: TAB at 08:24

## 2022-01-01 RX ADMIN — CEFTRIAXONE 1 G: 1 INJECTION, POWDER, FOR SOLUTION INTRAMUSCULAR; INTRAVENOUS at 18:10

## 2022-01-01 RX ADMIN — Medication 10 ML: at 00:11

## 2022-01-01 RX ADMIN — MULTIPLE VITAMINS W/ MINERALS TAB 1 TABLET: TAB at 10:16

## 2022-01-01 RX ADMIN — Medication 10 ML: at 10:06

## 2022-01-01 RX ADMIN — EPHEDRINE SULFATE 10 MG: 5 INJECTION INTRAVENOUS at 16:43

## 2022-01-01 RX ADMIN — ATORVASTATIN CALCIUM 10 MG: 10 TABLET, FILM COATED ORAL at 20:11

## 2022-01-01 RX ADMIN — Medication 10 ML: at 18:51

## 2022-01-01 RX ADMIN — Medication 10 ML: at 20:23

## 2022-01-01 RX ADMIN — Medication 10 ML: at 08:32

## 2022-01-01 RX ADMIN — MULTIPLE VITAMINS W/ MINERALS TAB 1 TABLET: TAB at 08:22

## 2022-01-01 RX ADMIN — ATORVASTATIN CALCIUM 10 MG: 10 TABLET, FILM COATED ORAL at 10:16

## 2022-01-01 RX ADMIN — LEVOTHYROXINE SODIUM 50 MCG: 0.05 TABLET ORAL at 05:34

## 2022-01-01 RX ADMIN — PSYLLIUM HUSK 1 PACKET: 3.4 POWDER ORAL at 08:22

## 2022-01-01 RX ADMIN — LIDOCAINE HYDROCHLORIDE 50 MG: 10 INJECTION, SOLUTION EPIDURAL; INFILTRATION; INTRACAUDAL; PERINEURAL at 16:12

## 2022-01-01 RX ADMIN — Medication 250 MG: at 20:16

## 2022-01-01 RX ADMIN — LANSOPRAZOLE 30 MG: KIT at 06:02

## 2022-01-01 RX ADMIN — Medication 10 ML: at 10:37

## 2022-01-01 RX ADMIN — LANSOPRAZOLE 30 MG: KIT at 08:00

## 2022-01-01 RX ADMIN — RIVAROXABAN 15 MG: 15 TABLET, FILM COATED ORAL at 17:52

## 2022-01-01 RX ADMIN — CEFTRIAXONE 1 G: 1 INJECTION, POWDER, FOR SOLUTION INTRAMUSCULAR; INTRAVENOUS at 17:36

## 2022-01-01 RX ADMIN — RIVAROXABAN 15 MG: 15 TABLET, FILM COATED ORAL at 18:17

## 2022-01-01 RX ADMIN — ONDANSETRON 4 MG: 2 INJECTION INTRAMUSCULAR; INTRAVENOUS at 16:46

## 2022-01-01 RX ADMIN — CEFTRIAXONE 1 G: 1 INJECTION, POWDER, FOR SOLUTION INTRAMUSCULAR; INTRAVENOUS at 18:17

## 2022-01-01 RX ADMIN — CEFTRIAXONE 1 G: 1 INJECTION, POWDER, FOR SOLUTION INTRAMUSCULAR; INTRAVENOUS at 18:42

## 2022-01-01 RX ADMIN — RIVAROXABAN 15 MG: 15 TABLET, FILM COATED ORAL at 18:47

## 2022-01-01 RX ADMIN — PANTOPRAZOLE SODIUM 40 MG: 40 TABLET, DELAYED RELEASE ORAL at 09:29

## 2022-01-01 RX ADMIN — MULTIPLE VITAMINS W/ MINERALS TAB 1 TABLET: TAB at 09:29

## 2022-01-01 RX ADMIN — MULTIPLE VITAMINS W/ MINERALS TAB 1 TABLET: TAB at 08:32

## 2022-01-07 NOTE — PROGRESS NOTES
Date of Office Visit: 01/10/2022  Encounter Provider: Dr. Cristopher Oliver    Place of Service: [facility] [place]  Patient Name: Devon Crane  :1932  Yelitza Mcdaniel MD    Chief Complaint   Patient presents with   • Coronary Artery Disease     2 month followu p   • Atrial Fibrillation   • Hypertension   • Hyperlipidemia     History of Present Illness    I am pleased to see Mrs. Crane in my office today as a follow-up    As you know, patient is 89 years old white female whose past medical history is significant for advanced COPD, home oxygen, CAD, CABG, who came today  for follow-up    In , patient underwent CABG x1 with LIMA to LAD.  Patient did well.  Left ventricular function was preserved.  In May 2018, patient was admitted at [facility] and underwent stress test which was abnormal.  Subsequent cardiac catheterization showed normal coronary arteries and LIMA to LAD is patent.  Echocardiogram showed EF of 70% and tricuspid regurgitation was noted with pulmonary artery pressure was 47 mm of mercury.    In 2021, patient underwent event monitor and it showed no significant pause and bradycardia.  Patient was noted to have short run of atrial fibrillation.  No significant pause and bradycardia was noted.  Echocardiogram showed normal left ventricular size and function and LVH is noted.    This is 2 months follow-up for the patient.  Patient has not had any further episode of fall or syncope or presyncope.  She denies any dizziness or lightheadedness.  Patient denies any orthopnea, PND, syncope or presyncope.  No leg edema noted.    EKG showed normal sinus rhythm with right bundle branch block.  Posterior fascicular block is noted.    Patient underwent event monitor and showed episodes of atrial fibrillation.  Patient has been started on Xarelto 15 mg daily.  I would not start beta-blocker because her resting heart rate is low.  I am worried that patient may become  bradycardic and it can cause more syncope or falling episodes.  If patient had recurrence of symptoms I would consider loop recorder implantation.    Patient is advised to quit aspirin      Past Medical History:   Diagnosis Date   • Abnormal ECG    • Anemia    • Asthma 2019   • CAD (coronary artery disease)    • Congenital heart disease    • COPD (chronic obstructive pulmonary disease) (HCC)    • Deep vein thrombosis (HCC)    • Dyslipidemia    • GERD (gastroesophageal reflux disease)    • Hearing loss    • Hyperlipidemia    • Hypertension    • MDS (myelodysplastic syndrome) (HCC)    • Osteoarthritis    • Oxygen dependent    • Paroxysmal A-fib (HCC)    • Presence of pessary    • Prolapse of female bladder, acquired    • Pulmonary hypertension (HCC)    • Pulmonary hypertension (HCC)    • Syncope    • Vaginal bleeding          Past Surgical History:   Procedure Laterality Date   • APPENDECTOMY  05/1947   • BONE MARROW BIOPSY  07/10/2019   • CARDIAC CATHETERIZATION  02/19/2003, 06/22/2004, 01/16/2007, 12/2010, 10/06/2014,05/27/2018   • CARDIAC VALVE REPLACEMENT     • CHOLECYSTECTOMY  02/06/2007   • CORONARY ANGIOPLASTY     • CORONARY ARTERY BYPASS GRAFT  2013   • CYSTOCELE REPAIR  05/1990   • EYE LENS IMPLANT SECONDARY  04/2000, 05/2000   • HYSTERECTOMY  04/1963   • KNEE CARTILAGE SURGERY Right 01/10/2001   • SKIN CANCER EXCISION      head and face           Current Outpatient Medications:   •  B Complex Vitamins (VITAMIN-B COMPLEX PO), Take  by mouth Daily., Disp: , Rfl:   •  esomeprazole (nexIUM) 20 MG packet, Take 1 tablet by mouth Daily., Disp: , Rfl:   •  Multiple Vitamins-Minerals (MULTIVITAMIN ADULT PO), Take 1 tablet by mouth Daily., Disp: , Rfl:   •  nitroglycerin (NITROSTAT) 0.4 MG SL tablet, Place 1 tablet under the tongue Every 5 (Five) Minutes As Needed for Chest Pain. Take no more than 3 doses in 15 minutes., Disp: 30 tablet, Rfl: 12  •  Parenteral Electrolytes (NUTRILYTE IV), Take  by mouth Daily., Disp:  ", Rfl:   •  rivaroxaban (Xarelto) 15 MG tablet, Take 1 tablet by mouth Daily With Dinner., Disp: 30 tablet, Rfl: 1  •  simvastatin (ZOCOR) 40 MG tablet, Take 1 tablet by mouth Daily., Disp: 90 tablet, Rfl: 3      Social History     Socioeconomic History   • Marital status:    Tobacco Use   • Smoking status: Never Smoker   • Smokeless tobacco: Never Used   Vaping Use   • Vaping Use: Never used   Substance and Sexual Activity   • Alcohol use: No   • Drug use: No   • Sexual activity: Not Currently     Partners: Male     Birth control/protection: None         Review of Systems   Constitutional: Negative for chills and fever.   HENT: Negative for ear discharge and nosebleeds.    Eyes: Negative for discharge and redness.   Cardiovascular: Positive for palpitations. Negative for chest pain, orthopnea, paroxysmal nocturnal dyspnea and syncope.   Respiratory: Positive for shortness of breath. Negative for cough and wheezing.    Endocrine: Negative for heat intolerance.   Skin: Negative for rash.   Musculoskeletal: Positive for arthritis and joint pain. Negative for myalgias.   Gastrointestinal: Negative for abdominal pain, melena, nausea and vomiting.   Genitourinary: Negative for dysuria and hematuria.   Neurological: Negative for dizziness, light-headedness, numbness and tremors.   Psychiatric/Behavioral: Negative for depression. The patient is not nervous/anxious.        Procedures      ECG 12 Lead    Date/Time: 1/10/2022 11:00 AM  Performed by: Cristopher Oliver MD  Authorized by: Cristopher Oliver MD   Comparison: compared with previous ECG   Similar to previous ECG  Rhythm: sinus rhythm    Clinical impression: normal ECG            ECG 12 Lead    (Results Pending)           Objective:    /88   Pulse 64   Ht 160 cm (62.99\")   Wt 51.7 kg (114 lb)   LMP  (LMP Unknown)   BMI 20.20 kg/m²         Constitutional:       Appearance: Well-developed.   Eyes:      General: No scleral icterus.        Right eye: No " discharge.   HENT:      Head: Normocephalic and atraumatic.   Neck:      Thyroid: No thyromegaly.      Lymphadenopathy: No cervical adenopathy.   Pulmonary:      Effort: Pulmonary effort is normal. No respiratory distress.      Breath sounds: Normal breath sounds. No wheezing. No rales.   Cardiovascular:      Normal rate. Regular rhythm.      No gallop.   Abdominal:      Tenderness: There is no abdominal tenderness.   Skin:     Findings: No erythema or rash.   Neurological:      Mental Status: Alert and oriented to person, place, and time.             Assessment:       Diagnosis Plan   1. Coronary artery disease involving autologous vein coronary bypass graft without angina pectoris  ECG 12 Lead   2. Mixed hyperlipidemia  ECG 12 Lead   3. Paroxysmal atrial fibrillation (HCC)  ECG 12 Lead   4. Essential hypertension  ECG 12 Lead   5. Pulmonary hypertension (HCC)  ECG 12 Lead   6. Centrilobular emphysema (HCC)  ECG 12 Lead            Plan:       MDM:    1.  CAD:    Patient is clinically stable.  No sign and symptom of angina pectoris    2.  Paroxysmal atrial fibrillation:    Patient probably has tachybradycardia syndrome.  At present I would not start rate control medicine or antiarrhythmic.  Continue Xarelto.  If patient gets frequent episode I would consider antiarrhythmic but patient may need permanent pacemaker at that time    3.  Hypertension:    Blood pressure is slightly elevated I would not recommend aggressive blood pressure control in this patient because of risk of fall if needed I would consider low-dose amlodipine in future    4.  Tachybradycardia syndrome:    I suspect patient has underlying sick sinus syndrome.  I do not have clinical indication for implantation of pacemaker at present

## 2022-04-12 NOTE — PROGRESS NOTES
"Chief Complaint  Difficulty Urinating and Back Pain  History of Present Illness    Devon Crane presents today with her daughter for an acute visit for upper back pain and posterior shoulder pain.    And problems using the restroom, she complains of dysuria with frequency for the last 3 days she is having trouble making it to the restroom in time and has had incontinence..         Current Outpatient Medications on File Prior to Visit   Medication Sig   • B Complex Vitamins (VITAMIN-B COMPLEX PO) Take  by mouth Daily.   • esomeprazole (NexIUM) 20 MG packet Take 1 tablet by mouth Daily.   • Multiple Vitamins-Minerals (MULTIVITAMIN ADULT PO) Take 1 tablet by mouth Daily.   • nitroglycerin (NITROSTAT) 0.4 MG SL tablet Place 1 tablet under the tongue Every 5 (Five) Minutes As Needed for Chest Pain. Take no more than 3 doses in 15 minutes.   • Parenteral Electrolytes (NUTRILYTE IV) Take  by mouth Daily.   • simvastatin (ZOCOR) 40 MG tablet Take 1 tablet by mouth Daily.   • Xarelto 15 MG tablet TAKE 1 TABLET BY MOUTH DAILY WITH DINNER     No current facility-administered medications on file prior to visit.       Objective   Vital Signs:   /66   Pulse 120   Temp 97.8 °F (36.6 °C)   Resp 20   Ht 160 cm (62.99\")   Wt 48.8 kg (107 lb 9.6 oz)   SpO2 96%   BMI 19.07 kg/m²       Physical Exam  Vitals and nursing note reviewed.   Constitutional:       General: She is not in acute distress.     Appearance: She is well-developed.   HENT:      Head: Normocephalic and atraumatic.      Ears:      Comments: Very hard of hearing, despite speaking very loudly need to repeat myself frequently  Cardiovascular:      Rate and Rhythm: Normal rate and regular rhythm.      Heart sounds: No murmur heard.  Pulmonary:      Effort: Pulmonary effort is normal.      Breath sounds: Wheezing present. No rhonchi or rales.      Comments: Course end expiratory wheezes  Abdominal:      General: Abdomen is flat. Bowel sounds are normal. There " is no distension.      Palpations: Abdomen is soft.      Tenderness: There is no right CVA tenderness or left CVA tenderness.   Musculoskeletal:         General: Normal range of motion.      Comments: Significant kyphosis  Mild tenderness palpation of low thoracic midline spine   Skin:     General: Skin is warm and dry.      Findings: No rash.   Neurological:      Mental Status: She is alert and oriented to person, place, and time.            Office Visit on 04/12/2022   Component Date Value Ref Range Status   • Color 04/12/2022 Maddie  Yellow, Straw, Dark Yellow, Maddie Final   • Clarity, UA 04/12/2022 Cloudy (A) Clear Final   • Glucose, UA 04/12/2022 Negative  Negative, 1000 mg/dL (3+) mg/dL Final   • Bilirubin 04/12/2022 Negative  Negative Final   • Ketones, UA 04/12/2022 Negative  Negative Final   • Specific Gravity  04/12/2022 1.005  1.005 - 1.030 Final   • Blood, UA 04/12/2022 50 Gaudencio/ul (A) Negative Final   • pH, Urine 04/12/2022 9.0 (A) 5.0 - 8.0 Final   • Protein, POC 04/12/2022 3+ (A) Negative mg/dL Final   • Urobilinogen, UA 04/12/2022 Normal  Normal Final   • Leukocytes 04/12/2022 75 Javier/ul (A) Negative Final   • Nitrite, UA 04/12/2022 Negative  Negative Final       Lab Results   Component Value Date    BUN 11 02/22/2022    CREATININE 0.96 02/22/2022    EGFRIFNONA 55 (L) 02/22/2022     02/22/2022    K 4.0 02/22/2022     02/22/2022    CALCIUM 10.1 02/22/2022    ALBUMIN 3.80 02/22/2022    BILITOT 0.4 02/22/2022    ALKPHOS 50 02/22/2022    AST 12 02/22/2022    ALT <5 02/22/2022    WBC 5.23 02/22/2022    RBC 2.75 (L) 02/22/2022    HCT 28.7 (L) 02/22/2022    .4 (H) 02/22/2022    MCH 34.2 (H) 02/22/2022        No results found for: HGBA1C             Assessment and Plan    Diagnoses and all orders for this visit:    1. Acute cystitis without hematuria (Primary)  -     sulfamethoxazole-trimethoprim (Bactrim DS) 800-160 MG per tablet; Take 1 tablet by mouth 2 (Two) Times a Day.  Dispense: 14  tablet; Refill: 0    2. Dysuria  -     POCT urinalysis dipstick, manual  -     Urine Culture - Urine, Urine, Random Void    3. Acute right-sided thoracic back pain  -     XR Spine Thoracic 2 View    4. Osteoporosis, unspecified osteoporosis type, unspecified pathological fracture presence  -     XR Chest 2 View  -     XR Spine Thoracic 2 View    5. Centrilobular emphysema (HCC)  -     XR Chest 2 View    6. MDS (myelodysplastic syndrome) (HCC)    7. Bilateral hearing loss, unspecified hearing loss type      Acute thoracic back pain without injury.  Patient has  History of osteoporosis with C2 vertebral body fracture and T1 compression fracture.  Despite mild pain on exam I am checking x-ray of thoracic spine to rule out osteoporotic compression fracture at this time.    Dysuria likely urinary tract infection with symptoms leukocytes in urine.  Prescription for Bactrim, sending urine for culture for confirmation.    Coarse expiratory wheezes with history of emphysema requiring oxygen therapy, uncertain if this is her baseline, checking chest x-ray    There are no discontinued medications.      Follow Up     Return if symptoms worsen or fail to improve.    Patient was given instructions and counseling regarding her condition or for health maintenance advice. Please see specific information pulled into the AVS if appropriate.

## 2022-04-14 PROBLEM — R06.02 SHORTNESS OF BREATH: Status: RESOLVED | Noted: 2020-02-26 | Resolved: 2022-01-01

## 2022-04-14 PROBLEM — N30.90 CYSTITIS: Status: ACTIVE | Noted: 2022-01-01

## 2022-04-14 PROBLEM — R42 VERTIGO: Status: RESOLVED | Noted: 2020-02-26 | Resolved: 2022-01-01

## 2022-04-14 PROBLEM — J44.9 COPD (CHRONIC OBSTRUCTIVE PULMONARY DISEASE): Status: ACTIVE | Noted: 2022-01-01

## 2022-04-14 NOTE — H&P
[facility] Medicine Services      Patient Name: Devon Crane  : 1932  MRN: [id number]  Primary Care Physician:  Yelitza Mcdaniel MD  Date of admission: 2022      Subjective      Chief Complaint: Bright red blood with urination    History of Present Illness: Devon Crane is a 89 y.o. female who presented to [facility] on 2022 who was seen by her primary care provider on 2022 and diagnosed with a urinary tract infection.  A culture was set up and the patient was started on Bactrim.  The day of admission the patient was noted to have bright red blood when she urinated so she was brought to the emergency room.  She denied any fever, chills, sweats, abdominal pain, GI or  complaints except for the blood in the urine.  Abdominal and pelvic CT scan showed evidence of cystitis.  The patient was started on Rocephin and was admitted.  On laboratory assessment in the ER emergency department the patient was noted to have a creatinine of 1.95 which was increased compared to 0.96 on 2022.  Patient also has chronic anemia with hemoglobin of 9.8 compared to 9.4 on 2022.  Patient is on aspirin and Xarelto for CAD status post CABG x1 in , and paroxysmal atrial fibrillation.    ROS   All other review of systems were reviewed and were negative.    Personal History     Past Medical History:   Diagnosis Date   • Abnormal ECG    • Anemia    • Asthma 2019   • CAD (coronary artery disease)    • Congenital heart disease    • COPD (chronic obstructive pulmonary disease) ([facility])    • Deep vein thrombosis (HCC)    • Dyslipidemia    • GERD (gastroesophageal reflux disease)    • Hearing loss    • Hyperlipidemia    • Hypertension    • MDS (myelodysplastic syndrome) ([facility])    • Osteoarthritis    • Oxygen dependent    • Paroxysmal A-fib (HCC)    • Presence of pessary    • Prolapse of female bladder, acquired    • Pulmonary hypertension (HCC)    • Pulmonary hypertension (HCC)     • Syncope    • Vaginal bleeding        Past Surgical History:   Procedure Laterality Date   • APPENDECTOMY  05/1947   • BONE MARROW BIOPSY  07/10/2019   • CARDIAC CATHETERIZATION  02/19/2003, 06/22/2004, 01/16/2007, 12/2010, 10/06/2014,05/27/2018   • CARDIAC VALVE REPLACEMENT     • CHOLECYSTECTOMY  02/06/2007   • CORONARY ANGIOPLASTY     • CORONARY ARTERY BYPASS GRAFT  2013   • CYSTOCELE REPAIR  05/1990   • EYE LENS IMPLANT SECONDARY  04/2000, 05/2000   • HYSTERECTOMY  04/1963   • KNEE CARTILAGE SURGERY Right 01/10/2001   • SKIN CANCER EXCISION      head and face       Family History: family history includes Cancer in her brother and sister; Diabetes in her brother; Heart disease in her brother, brother, father, mother, and sister; Stroke in her mother. Otherwise pertinent FHx was reviewed and not pertinent to current issue.    Social History:  reports that she has never smoked. She has never used smokeless tobacco. She reports that she does not drink alcohol and does not use drugs.    Home Medications:  Prior to Admission Medications     Prescriptions Last Dose Informant Patient Reported? Taking?    B Complex Vitamins (VITAMIN-B COMPLEX PO)   Yes No    Take  by mouth Daily.    esomeprazole (NexIUM) 20 MG packet   Yes No    Take 1 tablet by mouth Daily.    Multiple Vitamins-Minerals (MULTIVITAMIN ADULT PO)   Yes No    Take 1 tablet by mouth Daily.    nitroglycerin (NITROSTAT) 0.4 MG SL tablet   No No    Place 1 tablet under the tongue Every 5 (Five) Minutes As Needed for Chest Pain. Take no more than 3 doses in 15 minutes.    Parenteral Electrolytes (NUTRILYTE IV)  Self Yes No    Take  by mouth Daily.    simvastatin (ZOCOR) 40 MG tablet   No No    Take 1 tablet by mouth Daily.    Xarelto 15 MG tablet   No No    TAKE 1 TABLET BY MOUTH DAILY WITH DINNER            Allergies:  Allergies   Allergen Reactions   • Diphenhydramine Swelling     Unknown.        Objective      Vitals:   Temp:  [98.7 °F (37.1 °C)] 98.7 °F  (37.1 °C)  Heart Rate:  [66-96] 88  Resp:  [17-20] 17  BP: (114-138)/(54-73) 138/58    Physical Exam   Vital signs and nurses notes reviewed.  Well-developed well-nourished in no acute distress sitting up in bed awake and alert; mucous membranes moist; sclerae anicteric; neck supple; lungs clear to auscultation bilaterally; CV regular rate and rhythm; abdomen soft nontender nondistended with active bowel sounds; extremities with no edema, cyanosis or calf tenderness; palpable pedal pulses bilaterally; neurologic exam grossly nonfocal; no Barrios catheter.      Result Review    Result Review:  I have personally reviewed the results from the time of this admission to 4/14/2022 19:05 EDT and agree with these findings:  [x]  Laboratory  [x]  Microbiology  [x]  Radiology  [x]  EKG/Telemetry   [x]  Cardiology/Vascular   []  Pathology  [x]  Old records  []  Other:  Most notable findings are discussed in the assessment and plan.      Assessment/Plan        Active Hospital Problems:  Active Hospital Problems    Diagnosis    • COPD (chronic obstructive pulmonary disease) ([facility])    • Cystitis    • Essential hypertension    • Gastroesophageal reflux disease without esophagitis      No sxs on meds.     • Paroxysmal atrial fibrillation (HCC)      sinus by asculatation.  RBBB on EKG 03/2019     • MDS (myelodysplastic syndrome) ([facility])      Chronic anemia fairly stable. No Rx has been indicated.      • Mixed hyperlipidemia      Stable .       Plan:   Acute urinary tract infection, failed outpatient treatment  -Continue Rocephin initiated in the emergency room and await urine culture from 4/12/2022 and from today    Gross hematuria likely secondary to above and anticoagulants  -Monitor hemoglobin    Acute renal failure secondary to prerenal azotemia from medication (Bactrim)  -Creatinine 1.95 compared to 0.96 on 2/22/2022  -Discontinue Bactrim and avoid nephrotoxic medications  -Gentle IV fluids ordered    Paroxysmal atrial  fibrillation/tachybradycardia syndrome, currently in sinus rhythm  -EKG showed sinus rhythm with left anterior fascicular block and right bundle branch block  -Most recent echo 11/22/2021 was normal with LVEF of 55 to 60%  -Continue Xarelto  -Patient is not on any rate controlling medications at this time because she would likely require a pacemaker per outpatient records per Dr. Oliver    Coronary artery disease status post coronary artery bypass x1 in 2003  -Continue aspirin and statin once home meds verified    Myelodysplastic syndrome with chronic anemia  -Continue routine follow-up with Dr. Yap hematology/oncology    COPD, not in exacerbation  -No home medications listed   -DuoNebs as needed    Essential hypertension, chronic and controlled   -not on any home medications  -Monitor    Hyperlipidemia  -Continue statin once med rec is verified    DVT prophylaxis:  Medical DVT prophylaxis orders are present.    CODE STATUS:    Code Status (Patient has no pulse and is not breathing): CPR (Attempt to Resuscitate)  Medical Interventions (Patient has pulse or is breathing): Full Support    Admission Status:  I believe this patient meets observation status.    I discussed the patient's findings and my recommendations with patient.    This patient has been examined wearing appropriate Personal Protective Equipment and discussed with hospital infection control department. 04/14/22      Signature: Electronically signed by Holley Gonzalez MD, 04/14/22, 7:05 PM EDT.

## 2022-04-14 NOTE — TELEPHONE ENCOUNTER
Patient's son Jose Maria called. Stated that patient is complaining of Rectal Bleeding for 2 days - per son, patient is refusing to go to ER or come into the office. He would like to speak to a nurse about next steps.

## 2022-04-14 NOTE — ED PROVIDER NOTES
"Subjective   Chief complaint: \"bleeding from somewhere\"       Context: Patient is an 89-year-old female who comes in with family with complaints of \"bleeding from somewhere.\"  She was seen by her PCP 2 days ago and started on Bactrim for cystitis without hematuria which she states she has been taking but started bleeding today.  They are unsure if it is rectal bleeding vaginal bleeding or with urination but states it is bright red and seems to be when she is urinating.  She is denying any abdominal pain or flank pain.  No nausea vomiting or diarrhea.  Has had frequency and urgency but reports a decreased urine output.  She is currently on Xarelto and aspirin.  Trauma or falls.  She does not currently follow with nephrology or urology.  She does have a history of MDS and is currently following with Dr. Yap for monitoring.  She does have a history of emphysema and is chronically on a 3 L nasal cannula; she reports chronic dyspnea and states its not wound new worse or different than normal, no fever or cough.  Denies any swelling to her legs or feet.  Denies any exertional weakness or syncope.  No chest pain.      Location: genitalia   Duration: yesterday   Timing: intermittent  Quality/Severity: denies  Modifying factors:   Associated symptoms: with urination        Additional hx provided by: family    PCP: jose Yap- heme/onc               Review of Systems   Reason unable to perform ROS: supplied by family and patient.   Constitutional: Negative for fever.   HENT: Positive for hearing loss.         Chronic   Eyes: Negative for visual disturbance.   Respiratory: Positive for shortness of breath. Negative for cough.         Chronic   Cardiovascular: Negative.    Gastrointestinal: Negative for diarrhea, nausea and vomiting.   Genitourinary: Positive for frequency and urgency.   Musculoskeletal: Negative.    Skin: Negative.    Allergic/Immunologic: Negative for immunocompromised state.   Neurological: Negative for " syncope.   Hematological: Bruises/bleeds easily.   Psychiatric/Behavioral: Negative for confusion.       Past Medical History:   Diagnosis Date   • Abnormal ECG    • Anemia    • Asthma 2019   • CAD (coronary artery disease)    • Congenital heart disease    • COPD (chronic obstructive pulmonary disease) ([facility])    • Deep vein thrombosis (HCC)    • Dyslipidemia    • GERD (gastroesophageal reflux disease)    • Hearing loss    • Hyperlipidemia    • Hypertension    • MDS (myelodysplastic syndrome) ([facility])    • Osteoarthritis    • Oxygen dependent    • Paroxysmal A-fib ([facility])    • Presence of pessary    • Prolapse of female bladder, acquired    • Pulmonary hypertension (HCC)    • Pulmonary hypertension (HCC)    • Syncope    • Vaginal bleeding        Allergies   Allergen Reactions   • Diphenhydramine Swelling     Unknown.        Past Surgical History:   Procedure Laterality Date   • APPENDECTOMY  05/1947   • BONE MARROW BIOPSY  07/10/2019   • CARDIAC CATHETERIZATION  02/19/2003, 06/22/2004, 01/16/2007, 12/2010, 10/06/2014,05/27/2018   • CARDIAC VALVE REPLACEMENT     • CHOLECYSTECTOMY  02/06/2007   • CORONARY ANGIOPLASTY     • CORONARY ARTERY BYPASS GRAFT  2013   • CYSTOCELE REPAIR  05/1990   • EYE LENS IMPLANT SECONDARY  04/2000, 05/2000   • HYSTERECTOMY  04/1963   • KNEE CARTILAGE SURGERY Right 01/10/2001   • SKIN CANCER EXCISION      head and face       Family History   Problem Relation Age of Onset   • Cancer Sister    • Heart disease Sister    • Cancer Brother    • Heart disease Brother    • Diabetes Brother    • Heart disease Mother    • Stroke Mother    • Heart disease Father    • Heart disease Brother        Social History     Socioeconomic History   • Marital status:    Tobacco Use   • Smoking status: Never Smoker   • Smokeless tobacco: Never Used   Vaping Use   • Vaping Use: Never used   Substance and Sexual Activity   • Alcohol use: No   • Drug use: No   • Sexual activity: Not Currently     Partners: Male      Birth control/protection: None           Objective   Physical Exam     Vital signs and triage nurse note reviewed.   Constitutional: Awake, alert; nontoxic in appearance. thin  HEENT: Normocephalic, atraumatic; pupils are PERRL with intact EOM; oropharynx is pink and moist without exudate or erythema.  Hearing aids noted  Neck: Supple, full range of motion without pain;     Cardiovascular: Regular rate and rhythm, normal S1-S2.  Murmur without rub   Pulmonary: Respiratory effort regular nonlabored, breath sounds diminished bilateral lower lobes   Abdomen: Soft, nontender nondistended with normoactive bowel sounds; no rebound or guarding. No CVAT  Perineal exam with family and RN Suzy at bedside: No noted rectal bleeding or hemorrhoids.  Does have some excoriation noted to the vaginal vault with some atrophy, does appear to be some old blood noted around the urethra without any active vaginal bleeding.  No other discharge noted.  Musculoskeletal: Independent range of motion of all extremities with no palpable tenderness or edema.   Neuro: Alert oriented x3, speech is clear and appropriate, GCS 15   Skin:  Fleshtone warm, dry, intact; no erythematous or petechial rash or lesion       Procedures           ED Course      Labs Reviewed   BASIC METABOLIC PANEL - Abnormal; Notable for the following components:       Result Value    BUN 33 (*)     Creatinine 1.95 (*)     CO2 20.0 (*)     eGFR 24.2 (*)     All other components within normal limits    Narrative:     GFR Normal >60  Chronic Kidney Disease <60  Kidney Failure <15     URINALYSIS W/ CULTURE IF INDICATED - Abnormal; Notable for the following components:    Color, UA Red (*)     Appearance, UA Turbid (*)     Blood, UA Large (3+) (*)     Protein, UA >=300 mg/dL (3+) (*)     Leuk Esterase, UA Large (3+) (*)     All other components within normal limits   CBC WITH AUTO DIFFERENTIAL - Abnormal; Notable for the following components:    RBC 2.75 (*)     Hemoglobin 9.8  (*)     Hematocrit 27.7 (*)     .7 (*)     MCH 35.8 (*)     Lymphocyte % 13.8 (*)     Eosinophil % 0.1 (*)     All other components within normal limits   URINALYSIS, MICROSCOPIC ONLY - Abnormal; Notable for the following components:    RBC, UA Too Numerous to Count (*)     WBC, UA Too Numerous to Count (*)     Bacteria, UA Trace (*)     All other components within normal limits   BLOOD CULTURE   BLOOD CULTURE   URINE CULTURE   CBC AND DIFFERENTIAL    Narrative:     The following orders were created for panel order CBC & Differential.  Procedure                               Abnormality         Status                     ---------                               -----------         ------                     CBC Auto Differential[[id number]]        Abnormal            Final result                 Please view results for these tests on the individual orders.     Medications   sodium chloride 0.9 % flush 10 mL (10 mL Intravenous Given 4/14/22 1610)   cefTRIAXone (ROCEPHIN) in SWFI 1 gram/10ml IV PUSH syringe (has no administration in time range)   sodium chloride 0.9 % bolus 500 mL (500 mL Intravenous New Bag 4/14/22 1610)     CT Abdomen Pelvis Stone Protocol    Result Date: 4/14/2022   1. CT findings suggestive of cystitis. Correlate with clinical symptoms and urinalysis findings. 2. No urinary tract stone or hydronephrosis. 3. Additional findings include: Chronic lumbar compression fractures, cholecystectomy, hysterectomy, uncomplicated colonic diverticulosis.    Electronically Signed By-Marycarmen Damian MD On:4/14/2022 4:02 PM This report was finalized on [id number] by  Marycarmen Damian MD.    Prior to Admission medications    Medication Sig Start Date End Date Taking? Authorizing Provider   B Complex Vitamins (VITAMIN-B COMPLEX PO) Take  by mouth Daily.    ProviderJeff MD   esomeprazole (NexIUM) 20 MG packet Take 1 tablet by mouth Daily.    ProviderJeff MD   Multiple Vitamins-Minerals  "(MULTIVITAMIN ADULT PO) Take 1 tablet by mouth Daily.    ProviderJeff MD   nitroglycerin (NITROSTAT) 0.4 MG SL tablet Place 1 tablet under the tongue Every 5 (Five) Minutes As Needed for Chest Pain. Take no more than 3 doses in 15 minutes. 11/27/19   Yelitza Mcdaniel MD   Parenteral Electrolytes (NUTRILYTE IV) Take  by mouth Daily.    ProviderJeff MD   simvastatin (ZOCOR) 40 MG tablet Take 1 tablet by mouth Daily. 9/14/21   Yelitza Mcdaniel MD   sulfamethoxazole-trimethoprim (Bactrim DS) 800-160 MG per tablet Take 1 tablet by mouth 2 (Two) Times a Day. 4/12/22   Estefania Romero MD   Xarelto 15 MG tablet TAKE 1 TABLET BY MOUTH DAILY WITH DINNER 3/14/22   Cristopher Oliver MD                                                [facility]  Number of Diagnoses or Management Options  YUDELKA (acute kidney injury) ([facility])  Cystitis  Hematuria, unspecified type  Diagnosis management comments: Chart review: 4/12/22: Heather office note: cystitis without hematuria  culture pending; hx MDS, centrilobular emphysema    /68   Pulse 76   Temp 98.7 °F (37.1 °C) (Oral)   Resp 20   Ht 160 cm (63\")   Wt 48.5 kg (107 lb)   LMP  (LMP Unknown)   SpO2 100%   BMI 18.95 kg/m²        Comorbidities:  has a past medical history of Abnormal ECG, Anemia, Asthma (2019), CAD (coronary artery disease), Congenital heart disease, COPD (chronic obstructive pulmonary disease) ([facility]), Deep vein thrombosis (HCC), Dyslipidemia, GERD (gastroesophageal reflux disease), Hearing loss, Hyperlipidemia, Hypertension, MDS (myelodysplastic syndrome) ([facility]), Osteoarthritis, Oxygen dependent, Paroxysmal A-fib (HCC), Presence of pessary, Prolapse of female bladder, acquired, Pulmonary hypertension (HCC), Pulmonary hypertension (HCC), Syncope, and Vaginal bleeding.  Differentials: uti, cystitis, gi bleed, vaginal bleeding, dehydration   not all inclusive of differentials considered  Discussion with provider:  hospitalist  Radiology " interpretation: ct reviewed by me and interpreted by radiologist,   CT Abdomen Pelvis Stone Protocol    Result Date: 4/14/2022   1. CT findings suggestive of cystitis. Correlate with clinical symptoms and urinalysis findings. 2. No urinary tract stone or hydronephrosis. 3. Additional findings include: Chronic lumbar compression fractures, cholecystectomy, hysterectomy, uncomplicated colonic diverticulosis.    Electronically Signed By-Marycarmen Damian MD On:4/14/2022 4:02 PM This report was finalized on [id number] by  Marycarmen Damian MD.    Lab interpretation:  Labs viewed by me significant for,    Appropriate PPE worn during exam.  Patient had an IV established labs obtained and CT was obtained.  Nursing staff reports for in and out specimen was initially yellow then noted some gross hematuria, approximately 500 cc out after CT.  Patient was given Rocephin and blood cultures were obtained.  She was given IV fluid bolus, noted that creatinine has doubled in a month To 1.9.  I Discussed Findings with Patient and family.    i discussed findings with patient and family who voices understanding of admission      Final diagnoses:   Cystitis   Hematuria, unspecified type   YUDELKA (acute kidney injury) ([facility])       ED Disposition  ED Disposition     ED Disposition   Decision to Admit    Condition   --    Comment   Level of Care: Telemetry [5]   Admitting Physician: HARJEET MALDONADO [[id number]]   Attending Physician: HARJEET MALDONADO [[id number]]               No follow-up provider specified.       Medication List      No changes were made to your prescriptions during this visit.          Danelle Turner, APRN  04/14/22 3208

## 2022-04-14 NOTE — TELEPHONE ENCOUNTER
Called Jose Maria  no answer left message sorry Dr. Mcdaniel is not in office today and we have no opening in office today. Rectal bleeding could be serious  so it would be best to go to ER for evaluation now.  .

## 2022-04-14 NOTE — TELEPHONE ENCOUNTER
I also spoke with daughter Mecca, discussed ER with her and she and Jose Maria agree it would be the best to be seen to day in ER, and they would try to get her to agree.

## 2022-04-15 NOTE — CASE MANAGEMENT/SOCIAL WORK
Discharge Planning Assessment   [facility]     Patient Name: Devon Crane  MRN: [id number]  Today's Date: 4/15/2022    Admit Date: 4/14/2022     Discharge Needs Assessment     Row Name 04/15/22 1159       Living Environment    People in Home alone    Current Living Arrangements home    Primary Care Provided by self;child(jemima)    Provides Primary Care For no one, unable/limited ability to care for self    Family Caregiver if Needed child(jemima), adult    Family Caregiver Names Daughter- Mecca , Son - Jose Maria    Quality of Family Relationships supportive;involved;helpful    Able to Return to Prior Arrangements yes       Resource/Environmental Concerns    Resource/Environmental Concerns none    Transportation Concerns none       Transition Planning    Patient/Family Anticipates Transition to home    Patient/Family Anticipated Services at Transition none    Transportation Anticipated family or friend will provide       Discharge Needs Assessment    Readmission Within the Last 30 Days no previous admission in last 30 days    Equipment Currently Used at Home bath bench;oxygen;walker, standard;cane, straight    Concerns to be Addressed discharge planning    Anticipated Changes Related to Illness none    Equipment Needed After Discharge none    Provided Post Acute Provider List? Yes    Post Acute Provider List Home Health    Provided Post Acute Provider Quality & Resource List? Yes    Post Acute Provider Quality and Resource List Home Health    Delivered To Patient    Method of Delivery In person               Discharge Plan     Row Name 04/15/22 1201       Plan    Plan DC Plan: Routine Home    Patient/Family in Agreement with Plan yes    Plan Comments CM met with the patient and her daughter Mecca, at bedside to discuss discharge planning. PCP and pharmacy verified. The patient reports that she lives alone but her kids are in and out all the time. She reported that her daughter, Mecca lives next door. The patient reports  that she has a straight cane and a standard walker at home but uses the cane most of the time. She reports that she also has a shower chair and home O2 @ 2L provided through [email or web address]. She reports that she is still IADLs at home but no longer drives. She denies any HH/PT services and has no anticipated needs for discharge. CM asked if the patient might be interested in some HH at discharge. The patient reported not right now. CM asked if it would be ok to leave a HH list at bedside for the patient to look over and think about. The patient was agreeable to CM leaving a HH list but is still reporting that she does not want HH right now. List was provided for the patient. The patient reports that her daughter, Mecca or her son, Jose Maria will provide transport at discharge.              Expected Discharge Date and Time     Expected Discharge Date Expected Discharge Time    Apr 16, 2022          Demographic Summary     Row Name 04/15/22 1152       General Information    Admission Type observation    Arrived From emergency department    Referral Source admission list    Reason for Consult discharge planning    Preferred Language English       Contact Information    Permission Granted to Share Info With                Functional Status     Row Name 04/15/22 1158       Functional Status    Usual Activity Tolerance good    Current Activity Tolerance moderate       Functional Status, IADL    Medications independent    Meal Preparation independent    Housekeeping assistive person    Laundry assistive person    Shopping assistive equipment and person       Mental Status    General Appearance WDL WDL       Mental Status Summary    Recent Changes in Mental Status/Cognitive Functioning no changes       Employment/    Employment Status retired            Met with patient in room wearing PPE: mask/googles  Maintained distance greater than 6 feet and spent less than 15 minutes in the room.    Micheline Tavares RN  Case  Manager   Office Phone: [phone]  Office Cell: [phone]

## 2022-04-15 NOTE — PLAN OF CARE
Problem: Adult Inpatient Plan of Care  Goal: Plan of Care Review  Outcome: Ongoing, Progressing  Flowsheets (Taken 4/15/2022 0202)  Progress: no change  Plan of Care Reviewed With: patient  Outcome Evaluation: Patient with cystitis failed out patient treatment. Admitted for iv abx. Slept well through the night. No complaints or concerns expressed. Q2 turns to prevent skin breakdown.   Goal Outcome Evaluation:  Plan of Care Reviewed With: patient        Progress: no change  Outcome Evaluation: Patient with cystitis failed out patient treatment. Admitted for iv abx. Slept well through the night. No complaints or concerns expressed. Q2 turns to prevent skin breakdown.

## 2022-04-15 NOTE — PLAN OF CARE
Goal Outcome Evaluation:  Plan of Care Reviewed With: patient           Outcome Evaluation: Patient was A&O*4, rested well. No complaints of pain. will continue to monitor.

## 2022-04-16 PROBLEM — R31.9 HEMATURIA: Status: ACTIVE | Noted: 2022-01-01

## 2022-04-16 PROBLEM — R31.9 HEMATURIA: Status: RESOLVED | Noted: 2022-01-01 | Resolved: 2022-01-01

## 2022-04-16 PROBLEM — N17.9 AKI (ACUTE KIDNEY INJURY): Status: ACTIVE | Noted: 2022-01-01

## 2022-04-16 NOTE — PLAN OF CARE
Problem: Adult Inpatient Plan of Care  Goal: Absence of Hospital-Acquired Illness or Injury  Intervention: Identify and Manage Fall Risk  Description: Perform standard risk assessment on admission using a validated tool or comprehensive approach appropriate to the patient; reassess fall risk frequently, with change in status or transfer to another level of care.  Communicate fall injury risk to interprofessional healthcare team.  Determine need for increased observation, equipment and environmental modification, such as low bed, signage and supportive, nonskid footwear.  Adjust safety measures to individual developmental age, stage and identified risk factors.  Reinforce the importance of safety and physical activity with patient and family.  Perform regular intentional rounding to assess need for position change, pain assessment and personal needs, including assistance with toileting.  Recent Flowsheet Documentation  Taken 4/16/2022 0100 by Mychal Agiurre LPN  Safety Promotion/Fall Prevention:   safety round/check completed   room organization consistent   nonskid shoes/slippers when out of bed   mobility aid in reach   muscle strengthening facilitated   lighting adjusted   fall prevention program maintained   clutter free environment maintained   assistive device/personal items within reach   activity supervised  Taken 4/15/2022 2301 by Mychal Aguirre LPN  Safety Promotion/Fall Prevention:   safety round/check completed   room organization consistent   nonskid shoes/slippers when out of bed   muscle strengthening facilitated   mobility aid in reach   lighting adjusted   fall prevention program maintained   clutter free environment maintained   assistive device/personal items within reach   activity supervised  Taken 4/15/2022 2100 by Mychal Aguirre LPN  Safety Promotion/Fall Prevention:   toileting scheduled   safety round/check completed   room organization consistent   nonskid shoes/slippers  when out of bed   muscle strengthening facilitated   mobility aid in reach   lighting adjusted   fall prevention program maintained   clutter free environment maintained   assistive device/personal items within reach   activity supervised  Taken 4/15/2022 1947 by Mychal Aguirre LPN  Safety Promotion/Fall Prevention:   toileting scheduled   safety round/check completed   room organization consistent   nonskid shoes/slippers when out of bed   muscle strengthening facilitated   mobility aid in reach   lighting adjusted   fall prevention program maintained   clutter free environment maintained   assistive device/personal items within reach   activity supervised  Intervention: Prevent Skin Injury  Description: Perform a screening for skin injury risk, such as pressure or moisture associated skin damage on admission and at regular intervals throughout hospital stay.  Keep all areas of skin (especially folds) clean and dry.  Maintain adequate skin hydration.  Relieve and redistribute pressure and protect bony prominences; implement measures based on patient-specific risk factors.  Match turning and repositioning schedule to clinical condition.  Encourage weight shift frequently; assist with reposition if unable to complete independently.  Float heels off bed; avoid pressure on the Achilles tendon.  Keep skin free from extended contact with medical devices.  Encourage functional activity and mobility, as early as tolerated.  Use aids (e.g., slide boards, mechanical lift) during transfer.  Recent Flowsheet Documentation  Taken 4/16/2022 0100 by Mychal Aguirre LPN  Body Position: weight shifting  Taken 4/15/2022 2301 by Mychal Aguirre LPN  Body Position: position changed independently  Taken 4/15/2022 2100 by Mychal Aguirre LPN  Body Position: weight shifting  Taken 4/15/2022 1947 by Mychal Aguirre LPN  Body Position: weight shifting  Intervention: Prevent and Manage VTE (Venous Thromboembolism)  Risk  Description: Assess for VTE (venous thromboembolism) risk.  Encourage and assist with early ambulation.  Initiate and maintain compression or other therapy, as indicated, based on identified risk in accordance with organizational protocol and provider order.  Encourage both active and passive leg exercises while in bed, if unable to ambulate.  Recent Flowsheet Documentation  Taken 4/16/2022 0100 by Mychal Aguirre LPN  Activity Management: activity encouraged  Taken 4/15/2022 2301 by Mychal Aguirre LPN  Activity Management:   activity encouraged   bedrest  Taken 4/15/2022 2224 by Mychal Aguirre LPN  Activity Management: up to bedside commode  Taken 4/15/2022 2100 by Mychal Aguirre LPN  Activity Management:   up in chair   bedrest  Taken 4/15/2022 1947 by Mychal Aguirre LPN  Activity Management:   up in chair   bedrest  Intervention: Prevent Infection  Description: Maintain skin and mucous membrane integrity; promote hand, oral and pulmonary hygiene.  Optimize fluid balance, nutrition, sleep and glycemic control to maximize infection resistance.  Identify potential sources of infection early to prevent or mitigate progression of infection (e.g., wound, lines, devices).  Evaluate ongoing need for invasive devices; remove promptly when no longer indicated.  Recent Flowsheet Documentation  Taken 4/16/2022 0100 by Mychal Aguirre LPN  Infection Prevention:   visitors restricted/screened   single patient room provided   rest/sleep promoted   personal protective equipment utilized   hand hygiene promoted  Taken 4/15/2022 2301 by Mychal Aguirre LPN  Infection Prevention:   visitors restricted/screened   single patient room provided   rest/sleep promoted   personal protective equipment utilized   hand hygiene promoted  Taken 4/15/2022 2100 by Mychal Aguirre LPN  Infection Prevention:   visitors restricted/screened   single patient room provided   hand hygiene promoted    personal protective equipment utilized   rest/sleep promoted  Taken 4/15/2022 1947 by Mychal Aguirre LPN  Infection Prevention: single patient room provided  Goal: Optimal Comfort and Wellbeing  Intervention: Monitor Pain and Promote Comfort  Description: Assess pain level, treatment efficacy and patient response at regular intervals using a consistent pain scale.  Consider the presence and impact of preexisting chronic pain.  Encourage patient and caregiver involvement in pain assessment, interventions and safety measures.  Recent Flowsheet Documentation  Taken 4/15/2022 2301 by Mychal Aguirre LPN  Pain Management Interventions:   quiet environment facilitated   see MAR   position adjusted   pain management plan reviewed with patient/caregiver   medication offered but refused  Taken 4/15/2022 1947 by Mychal Aguirre LPN  Pain Management Interventions:   see MAR   quiet environment facilitated   position adjusted   Goal Outcome Evaluation:

## 2022-04-16 NOTE — PROGRESS NOTES
[facility] Medicine Services Daily Progress Note    Patient Name: Devon Crane  : 1932  MRN: [id number]  Primary Care Physician:  Yelitza Mcdaniel MD  Date of admission: 2022      Subjective      Chief Complaint: Bright red blood with urination      Patient Reports   4/15/2022: The patient denies any further bleeding with urination.  She denies dysuria or other complaints at this time.    ROS   All other review of systems were reviewed and were negative.      Objective      Vitals:   Temp:  [97.4 °F (36.3 °C)-98.1 °F (36.7 °C)] 97.4 °F (36.3 °C)  Heart Rate:  [64-72] 67  Resp:  [16-18] 16  BP: ()/(53-73) 103/64  Flow (L/min):  [3] 3    Physical Exam   Vital signs and nurses notes reviewed.  Well-developed well-nourished elderly female in no acute distress sitting up in bed awake and alert; mucous membranes moist; sclerae anicteric; lungs clear to auscultation bilaterally; CV regular rate and rhythm; abdomen soft nontender nondistended with active bowel sounds; extremities with no edema, cyanosis or calf tenderness; palpable pedal pulses bilaterally; neurologic exam grossly nonfocal; no Barrios catheter.       Result Review    Result Review:  I have personally reviewed the results from the time of this admission to 4/15/2022 20:05 EDT and agree with these findings:  [x]  Laboratory  [x]  Microbiology  [x]  Radiology  [x]  EKG/Telemetry   [x]  Cardiology/Vascular   []  Pathology  [x]  Old records  []  Other:  Most notable findings discussed in the assessment and plan.          Assessment/Plan      Brief Patient Summary:  Devon Crane is a 89 y.o. female who presented to [facility] on 2022 who was seen by her primary care provider on 2022 and diagnosed with a urinary tract infection.  A culture was set up and the patient was started on Bactrim.  The day of admission the patient was noted to have bright red blood when she urinated so she was brought to  the emergency room.  She denied any fever, chills, sweats, abdominal pain, GI or  complaints except for the blood in the urine.  Abdominal and pelvic CT scan showed evidence of cystitis.  The patient was started on Rocephin and was admitted.  On laboratory assessment in the ER emergency department the patient was noted to have a creatinine of 1.95 which was increased compared to 0.96 on 2/22/2022.  Patient also has chronic anemia with hemoglobin of 9.8 compared to 9.4 on 2/22/2022.  Patient is on aspirin and Xarelto for CAD status post CABG x1 in 2003, and paroxysmal atrial fibrillation.      Current inpatient medications include:  atorvastatin, 20 mg, Oral, Daily  cefTRIAXone, 1 g, Intravenous, Q24H  enoxaparin, 1 mg/kg, Subcutaneous, Q24H  multivitamin with minerals, 1 tablet, Oral, Daily  pantoprazole, 40 mg, Oral, QAM AC  sodium chloride, 3 mL, Intravenous, Q12H       Pharmacy Consult - Pharmacy to dose,   sodium chloride, 50 mL/hr, Last Rate: 50 mL/hr (04/15/22 1637)         Active Hospital Problems:  Active Hospital Problems    Diagnosis    • **Cystitis    • COPD (chronic obstructive pulmonary disease) ([facility])    • Essential hypertension    • Gastroesophageal reflux disease without esophagitis      No sxs on meds.     • Paroxysmal atrial fibrillation (HCC)      sinus by asculatation.  RBBB on EKG 03/2019     • MDS (myelodysplastic syndrome) ([facility])      Chronic anemia fairly stable. No Rx has been indicated.      • Mixed hyperlipidemia      Stable .     • Coronary artery disease involving autologous vein coronary bypass graft without angina pectoris      Overview:   S/p CABG x1 in 2003  No sxs.        Plan:   Acute urinary tract infection, failed outpatient treatment  -Continue Rocephin initiated in the emergency room and await urine culture from 4/12/2022 and from today     Gross hematuria likely secondary to above and anticoagulants  -Monitor hemoglobin     Acute renal failure secondary to prerenal azotemia  from medication (Bactrim)  -Creatinine 1.95 compared to 0.96 on 2/22/2022  -Discontinue Bactrim and avoid nephrotoxic medications  -Gentle IV fluids ordered     Paroxysmal atrial fibrillation/tachybradycardia syndrome, currently in sinus rhythm  -EKG showed sinus rhythm with left anterior fascicular block and right bundle branch block  -Most recent echo 11/22/2021 was normal with LVEF of 55 to 60%  -Continue Xarelto  -Patient is not on any rate controlling medications at this time because she would likely require a pacemaker per outpatient records per Dr. Oliver     Coronary artery disease status post coronary artery bypass x1 in 2003  -Continue aspirin and statin once home meds verified     Myelodysplastic syndrome with chronic anemia  -Continue routine follow-up with Dr. Yap hematology/oncology     COPD, not in exacerbation  -No home medications listed   -DuoNebs as needed     Essential hypertension, chronic and controlled   -not on any home medications  -Monitor     Hyperlipidemia  -Continue statin once med rec is verified    DVT prophylaxis:  Medical DVT prophylaxis orders are present.    CODE STATUS:    Code Status (Patient has no pulse and is not breathing): CPR (Attempt to Resuscitate)  Medical Interventions (Patient has pulse or is breathing): Full Support      Disposition:  I expect patient to be discharged once the urine culture is final if she is continuing to clinically improve.    This patient has been examined wearing appropriate Personal Protective Equipment and discussed with hospital infection control department. 04/15/22      Electronically signed by Holley Gonzalez MD, 04/15/22, 20:05 EDT.  [facility] Hospitalist Team

## 2022-04-16 NOTE — DISCHARGE SUMMARY
[facility] Medicine Services  DISCHARGE SUMMARY    Patient Name: Devon Crane  : 1932  MRN: [id number]    Date of Admission: 2022  Discharge Diagnosis: Acute klebsiella pmeumoniae urinary tract infection  Date of Discharge:  2022  Primary Care Physician: Yelitza Mcdaniel MD      Presenting Problem:   Cystitis [N30.90]  YUDELKA (acute kidney injury) ([facility]) [N17.9]  Hematuria, unspecified type [R31.9]    Active and Resolved Hospital Problems:  Active Hospital Problems    Diagnosis POA   • **Cystitis [N30.90] Yes     Priority: High   • YUDELKA (acute kidney injury) (HCC) [N17.9] Yes   • COPD (chronic obstructive pulmonary disease) ([facility]) [J44.9] Yes   • Essential hypertension [I10] Yes   • Gastroesophageal reflux disease without esophagitis [K21.9] Yes   • Paroxysmal atrial fibrillation ([facility]) [I48.0] Yes   • MDS (myelodysplastic syndrome) ([facility]) [D46.9] Yes   • Mixed hyperlipidemia [E78.2] Yes   • Coronary artery disease involving autologous vein coronary bypass graft without angina pectoris [I25.810] Yes      Resolved Hospital Problems    Diagnosis POA   • Hematuria [R31.9] Yes     Acute urinary tract infection, failed outpatient treatment  -Continue Rocephin initiated in the emergency room and await urine culture from 2022 and from today     Gross hematuria likely secondary to above and anticoagulants  Chronic anemia  -Previous anemia work-up was negative  -hemoglobin remained stable at 9.9 compared to 9.4 on 2022     Acute renal failure secondary to prerenal azotemia from medication (Bactrim)  -Creatinine 1.95 compared to 0.96 on 2022  -Discontinue Bactrim and avoid nephrotoxic medications  -Creatinine improved 1.44 with gentle IV fluids      Paroxysmal atrial fibrillation/tachybradycardia syndrome, currently in sinus rhythm  -EKG showed sinus rhythm with left anterior fascicular block and right bundle branch block  -Most recent echo 2021 was normal with  LVEF of 55 to 60%  -Continue Xarelto  -Patient is not on any rate controlling medications at this time because she would likely require a pacemaker per outpatient records per Dr. Oliver     Coronary artery disease status post coronary artery bypass x1 in 2003  -Continue aspirin and statin      Myelodysplastic syndrome with chronic anemia  -Continue routine follow-up with Dr. Yap hematology/oncology     COPD, not in exacerbation  -No home medications listed   -DuoNebs as needed     Essential hypertension, chronic and controlled   -not on any home medications     Hyperlipidemia  -Continue statin      Hospital Course     Hospital Course:  Devon Crane is a 89 y.o. female who presented to [facility] on 4/14/2022 who was seen by her primary care provider on 4/12/2022 and diagnosed with a urinary tract infection.  A culture was set up and the patient was started on Bactrim.  The day of admission the patient was noted to have bright red blood when she urinated so she was brought to the emergency room.  She denied any fever, chills, sweats, abdominal pain, GI or  complaints except for the blood in the urine.  Abdominal and pelvic CT scan showed evidence of cystitis.  The patient was started on Rocephin and was admitted.  On laboratory assessment in the emergency department the patient was noted to have a creatinine of 1.95 which was increased compared to 0.96 on 2/22/2022.  Patient also has chronic anemia with hemoglobin of 9.8 compared to 9.4 on 2/22/2022.  Patient is on aspirin and Xarelto for CAD status post CABG x1 in 2003, and paroxysmal atrial fibrillation.     The patient was given IV fluids and Rocephin.  Bactrim was discontinued.  Patient had gradual improvement in her renal function.  The urine culture showed normal urogenital mauro.  Blood cultures x2 no growth and pending.  Patient is now felt to be stable for discharge.        Day of Discharge     Vital Signs:  Temp:  [98 °F (36.7 °C)-98.4 °F  (36.9 °C)] 98 °F (36.7 °C)  Heart Rate:  [69-84] 71  Resp:  [16-17] 17  BP: (118-135)/(62-75) 130/62  Flow (L/min):  [3] 3    Physical Exam:  Physical Exam   Vital signs and nurses notes reviewed.  Well-developed thin female in no acute distress sitting up in bed awake and alert; mucous membranes moist; sclerae anicteric; neck supple; lungs clear to auscultation bilaterally; CV regular rate and rhythm; abdomen soft nontender nondistended with active bowel sounds; extremities with no edema, cyanosis or calf tenderness; palpable pedal pulses bilaterally; neurologic exam grossly nonfocal; no Barrios catheter.    Pertinent  and/or Most Recent Results     LAB RESULTS:      Lab 04/16/22  0206 04/15/22  0243 04/14/22  1520   WBC 6.50 5.20 6.70   HEMOGLOBIN 9.5* 9.3* 9.8*   HEMATOCRIT 27.3* 26.6* 27.7*   PLATELETS 170 174 190   NEUTROS ABS 3.90 3.30 5.00   LYMPHS ABS 1.60 1.20 0.90   MONOS ABS 0.90 0.70 0.70   EOS ABS 0.10 0.00 0.00   .0* 101.4* 100.7*         Lab 04/16/22  0206 04/15/22  0243 04/14/22  1520   SODIUM 137 137 137   POTASSIUM 4.5 4.1 4.0   CHLORIDE 106 107 102   CO2 20.0* 19.0* 20.0*   ANION GAP 11.0 11.0 15.0   BUN 24* 27* 33*   CREATININE 1.54* 1.86* 1.95*   EGFR 32.1* 25.6* 24.2*   GLUCOSE 94 91 88   CALCIUM 9.3 9.7 9.7   MAGNESIUM 1.8 1.8  --                          Brief Urine Lab Results  (Last result in the past 365 days)      Color   Clarity   Blood   Leuk Est   Nitrite   Protein   CREAT   Urine HCG        04/14/22 1609 Red  Comment: Any Substance that causes an abnormal urine color can alter the accuracy of the chemical reactions.   Turbid   Large (3+)   Large (3+)   Negative   >=300 mg/dL (3+)               Microbiology Results (last 10 days)     Procedure Component Value - Date/Time    COVID PRE-OP / PRE-PROCEDURE SCREENING ORDER (NO ISOLATION) - Swab, Nasopharynx [[id number]]  (Normal) Collected: 04/14/22 2010    Lab Status: Final result Specimen: Swab from Nasopharynx Updated: 04/14/22 2055     Narrative:      The following orders were created for panel order COVID PRE-OP / PRE-PROCEDURE SCREENING ORDER (NO ISOLATION) - Swab, Nasopharynx.  Procedure                               Abnormality         Status                     ---------                               -----------         ------                     COVID-19,CEPHEID/HUY,CO...[[id number]]  Normal              Final result                 Please view results for these tests on the individual orders.    COVID-19,CEPHEID/HUY,COR/[facility]IN[facility]/PAD/RUDOLPH IN-HOUSE(OR EMERGENT/ADD-ON),NP SWAB IN TRANSPORT MEDIA 3-4 HR TAT, RT-PCR - Swab, Nasopharynx [[id number]]  (Normal) Collected: 04/14/22 2010    Lab Status: Final result Specimen: Swab from Nasopharynx Updated: 04/14/22 2055     COVID19 Not Detected    Narrative:      Fact sheet for providers: https://www.fda.gov/media/628365/download     Fact sheet for patients: https://www.fda.gov/media/250564/download  Fact sheet for providers: https://www.fda.gov/media/299513/download    Fact sheet for patients: https://www.fda.gov/media/873222/download    Test performed by PCR.    Blood Culture - Blood, Arm, Left [[id number]]  (Normal) Collected: 04/14/22 1823    Lab Status: Preliminary result Specimen: Blood from Arm, Left Updated: 04/15/22 1847     Blood Culture No growth at 24 hours    Blood Culture - Blood, Arm, Left [[id number]]  (Normal) Collected: 04/14/22 1729    Lab Status: Preliminary result Specimen: Blood from Arm, Left Updated: 04/15/22 1733     Blood Culture No growth at 24 hours    Urine Culture - Urine, Urine, Catheter In/Out [[id number]] Collected: 04/14/22 1609    Lab Status: Final result Specimen: Urine, Catheter In/Out Updated: 04/15/22 2145     Urine Culture <25,000 CFU/mL Mixed Mauro Isolated    Narrative:      Specimen contains mixed organisms of questionable pathogenicity which indicates contamination with commensal mauro.  Further identification is unlikely to provide clinically useful information.   Suggest recollection.    Urine Culture - Urine, Urine, Random Void [[id number]]  (Abnormal) Collected: 04/12/22 1139    Lab Status: Final result Specimen: Urine, Random Void Updated: 04/15/22 1436     Urine Culture Final report     Result 1 Klebsiella pneumoniae     Comment: Cefazolin <=4 ug/mL  Cefazolin with an CASSANDRA <=16 predicts susceptibility to the oral agents  cefaclor, cefdinir, cefpodoxime, cefprozil, cefuroxime, cephalexin,  and loracarbef when used for therapy of uncomplicated urinary tract  infections due to E. coli, Klebsiella pneumoniae, and Proteus  mirabilis.  Greater than 100,000 colony forming units per mL          Susceptibility Testing Comment     Comment:       ** S = Susceptible; I = Intermediate; R = Resistant **                     P = Positive; N = Negative              MICS are expressed in micrograms per mL     Antibiotic                 RSLT#1    RSLT#2    RSLT#3    RSLT#4  Amoxicillin/Clavulanic Acid    S  Ampicillin                     R  Cefepime                       S  Ceftriaxone                    S  Cefuroxime                     S  Ciprofloxacin                  S  Ertapenem                      S  Gentamicin                     S  Imipenem                       S  Levofloxacin                   S  Meropenem                      S  Nitrofurantoin                 S  Piperacillin/Tazobactam        S  Tetracycline                   S  Tobramycin                     S  Trimethoprim/Sulfa             S         Narrative:      Performed at:  [address] [place] [address]  [id number]  : Augusto Quick PhD, Phone:  [phone]          XR Chest 2 View    Result Date: 4/12/2022  Impression: 1. Negative for acute cardiopulmonary process. 2. Exaggerated thoracic kyphosis with multilevel thoracic compression fractures similar to prior chest CT from 07/02/2019.  Electronically Signed By-Joseph Rene MD On:4/12/2022 12:45 PM This report was finalized on  [id number] by  Joseph Rene MD.    XR Spine Thoracic 2 View    Result Date: 4/12/2022  Impression: Exaggerated thoracic kyphosis with multiple chronic thoracic compression fracture similar to prior chest CT on 07/02/2019.  Electronically Signed By-Joseph Rene MD On:4/12/2022 12:46 PM This report was finalized on [id number] by  Joseph Rene MD.    CT Abdomen Pelvis Stone Protocol    Result Date: 4/14/2022  Impression:  1. CT findings suggestive of cystitis. Correlate with clinical symptoms and urinalysis findings. 2. No urinary tract stone or hydronephrosis. 3. Additional findings include: Chronic lumbar compression fractures, cholecystectomy, hysterectomy, uncomplicated colonic diverticulosis.    Electronically Signed By-Marycarmen Damian MD On:4/14/2022 4:02 PM This report was finalized on [id number] by  Marycarmen Damian MD.              Results for orders placed during the hospital encounter of 11/22/21    Adult Transthoracic Echo Complete W/ Cont if Necessary Per Protocol    Interpretation Summary  · Left ventricular systolic function is normal.  · Left ventricular ejection fraction is 55 to 60%  · Left ventricular wall thickness is consistent with mild concentric hypertrophy.  · Left ventricular diastolic function was normal.  · Calculated right ventricular systolic pressure from tricuspid regurgitation is 32.1 mmHg.      Labs Pending at Discharge:  Pending Labs     Order Current Status    Blood Culture - Blood, Arm, Left Preliminary result    Blood Culture - Blood, Arm, Left Preliminary result          Procedures Performed           Consults:   Consults     No orders found from 3/16/2022 to 4/15/2022.            Discharge Details        Discharge Medications      New Medications      Instructions Start Date   cefdinir 300 MG capsule  Commonly known as: OMNICEF   300 mg, Oral, 2 Times Daily         Continue These Medications      Instructions Start Date   esomeprazole 20 MG packet  Commonly known as:  NexIUM   1 tablet, Oral, Daily      multivitamin with minerals tablet tablet   1 tablet, Oral, Daily      nitroglycerin 0.4 MG SL tablet  Commonly known as: NITROSTAT   0.4 mg, Sublingual, Every 5 Minutes PRN, Take no more than 3 doses in 15 minutes.      simvastatin 40 MG tablet  Commonly known as: ZOCOR   40 mg, Oral, Daily      VITAMIN-B COMPLEX PO   1 tablet, Oral, Daily      Xarelto 15 MG tablet  Generic drug: rivaroxaban   TAKE 1 TABLET BY MOUTH DAILY WITH DINNER             Allergies   Allergen Reactions   • Bactrim [Sulfamethoxazole-Trimethoprim] Other (See Comments)     Acute renal failure   • Diphenhydramine Swelling     Unknown.          Discharge Disposition:   Home or Self Care    Diet:  Hospital:  Diet Order   Procedures   • Diet Regular         Discharge Activity:   Activity Instructions     Activity as Tolerated              CODE STATUS:  Code Status and Medical Interventions:   Ordered at: 04/14/22 1902     Code Status (Patient has no pulse and is not breathing):    CPR (Attempt to Resuscitate)     Medical Interventions (Patient has pulse or is breathing):    Full Support         Future Appointments   Date Time Provider Department Center   6/14/2022  9:00 AM LAB [facility] ONC LAB NA [facility]   6/21/2022  9:30 AM LAB MD [facility] ONC LAB NA [facility]   6/21/2022  9:45 AM Marco Yap MD [facility] ONC NA [facility]   7/11/2022 10:45 AM Cristopher Oliver MD [facility] NA P [facility]   8/26/2022  9:15 AM Cristopher Oliver MD [facility] CARD CD [facility]       Additional Instructions for the Follow-ups that You Need to Schedule     Call MD With Problems / Concerns   As directed      Instructions: Call [phone] or email [email or web address] for problems or concerns.    Order Comments: Instructions: Call [phone] or email [email or web address] for problems or concerns.          Discharge Follow-up with PCP   As directed       Currently Documented PCP:    Yelitza Mcdaniel MD    PCP Phone Number:    [phone]      Follow Up Details: 2-3 business days               Time spent on Discharge including face to face service:  25 minutes    This patient has been examined wearing appropriate Personal Protective Equipment and discussed with hospital infection control department. 04/16/22      Signature: Electronically signed by Holley Gonzalez MD, 04/16/22, 2:30 PM EDT.

## 2022-04-16 NOTE — OUTREACH NOTE
Prep Survey    Flowsheet Row Responses   [facility] patient discharged from? [facility]   Is LACE score < 7 ? No   Emergency Room discharge w/ pulse ox? No   Eligibility [facility]   Date of Admission 04/14/22   Date of Discharge 04/16/22   Discharge Disposition Home or Self Care   Discharge diagnosis Acute urinary tract infection   Does the patient have one of the following disease processes/diagnoses(primary or secondary)? Other   Does the patient have Home health ordered? No   Is there a DME ordered? No   Prep survey completed? Yes          JULIOCESAR SELBY - Registered Nurse

## 2022-04-18 NOTE — CASE MANAGEMENT/SOCIAL WORK
Case Management Discharge Note           Provided Post Acute Provider List?: Yes  Post Acute Provider List: Home Health  Provided Post Acute Provider Quality & Resource List?: Yes  Post Acute Provider Quality and Resource List: Home Health  Delivered To: Patient  Method of Delivery: In person    Selected Continued Care - Discharged on 4/16/2022 Admission date: 4/14/2022 - Discharge disposition: Home or Self Care   Transportation Services  Private: Car    Final Discharge Disposition Code: 01 - home or self-care

## 2022-04-18 NOTE — TELEPHONE ENCOUNTER
Called and spoke with the patients Daughter. She states that the patient was admitted into the hospital last Thursday  following her appointment here. She states that the patient started urinating blood. As well as the bactrim that was given caused her to have an allergic reaction and had caused the patient to go into Acute renal failure. The patients daughter states that she was discharged from the hospital over the weekend and is at home resting. She also states that the patient does have a follow up with her PCP.

## 2022-04-18 NOTE — TELEPHONE ENCOUNTER
----- Message from Estefania Romero MD sent at 4/15/2022  5:32 PM EDT -----  Samantha,  Please make sure patient has seen this message:  Devon,  Urine culture grew Klebsiella consistent with urinary tract infection.  Sensitivity indicates it is sensitive to Bactrim, the antibiotic you are prescribed.  Please finish antibiotic and if symptoms persist schedule an appointment with me or Dr. Mcdaniel for further evaluation  Estefania Romero MD

## 2022-04-18 NOTE — OUTREACH NOTE
Call Center TCM Note    Flowsheet Row Responses   [facility] patient discharged from? [facility]   Does the patient have one of the following disease processes/diagnoses(primary or secondary)? Other   TCM attempt successful? No   Unsuccessful attempts Attempt 2          Maddie Garrett RN    4/18/2022, 16:20 EDT

## 2022-04-19 PROBLEM — N30.90 CYSTITIS: Status: RESOLVED | Noted: 2022-01-01 | Resolved: 2022-01-01

## 2022-04-19 PROBLEM — H81.03 LABYRINTHINE VERTIGO WITH INVOLVEMENT OF BOTH INNER EARS: Status: RESOLVED | Noted: 2020-02-26 | Resolved: 2022-01-01

## 2022-04-19 NOTE — OUTREACH NOTE
Call Center TCM Note    Flowsheet Row Responses   [facility] patient discharged from? [facility]   Does the patient have one of the following disease processes/diagnoses(primary or secondary)? Other   TCM attempt successful? No   Unsuccessful attempts Attempt 3  [Busy signal.  Three attempts made- no answer.  No verbal release on file from PCP group within timeframe ]          Maddie Garrett RN    4/19/2022, 12:02 EDT

## 2022-04-19 NOTE — PROGRESS NOTES
Transitional Care Follow Up Visit  Subjective     Devon Crane is a 89 y.o. female who presents for a transitional care management visit.    Devon was seen at [facility] . She was admitted on 04/14/2022  for Cystitis. She was discharged on 04/16/2022. Discharge diagnosis was cystitis, hematuria. Labs that were performed during the encounter included: CMP-elevated Creatinine 1.95, elevated BUN 33, decreased eGFR 24.2, co2 decreased 20, normal liver function and electrolytes, CBC-decreased hemoglobin 9.8, normal white blood count and platelet count and urine culture-mixed mauro. Diagnostic studies that were performed included: CT Scan-cystitis, no stone or hydronephrosis. Devon was started on Bactrim. Kidney functions decreased. Bactrim was discontinued. She is currently taking cefdinir 300mg. Currently Devon receives care at home. Complications from the hospital stay include none. The patient stated that they do not need help with their daily life and activities. The patient stated that they do have emotional support at home.     Devon c/o fatigue, she suffers from myelodysplastic syndrome with a average Hgb of 9.5, her Hgb was 9.5, 04/16/2022      Urinary Frequency   This is a new problem. The current episode started in the past 7 days (04/13/2022). The problem occurs every urination. The problem has been gradually worsening. The quality of the pain is described as aching. There has been no fever. There is a history of pyelonephritis. Associated symptoms include chills and frequency. Pertinent negatives include no flank pain, hematuria (resolved), nausea, urgency or vomiting. She has tried antibiotics (cefdinir) for the symptoms.   Coronary Artery Disease  Presents for follow-up visit. Symptoms include chest pressure (episode, last for few seconds), dizziness, palpitations and shortness of breath. Pertinent negatives include no chest pain, chest tightness, leg swelling or weight gain. The  "symptoms have been stable. Compliance with diet is good. Compliance with medications is good.   Atrial Fibrillation  Presents for follow-up visit. Symptoms include dizziness, palpitations, shortness of breath and weakness. Symptoms are negative for chest pain and syncope. The symptoms have been stable (she remains on anti coagulation. ). Past medical history includes atrial fibrillation and CAD. There are no medication compliance problems.   Fatigue  This is a chronic problem. Episode onset: worse over the last 2 mos.  The problem occurs constantly. The problem has been gradually worsening. Associated symptoms include chills, fatigue and weakness. Pertinent negatives include no abdominal pain, anorexia, change in bowel habit, chest pain, congestion, coughing, fever, nausea, rash or vomiting.       Within 48 business hours after discharge our office contacted her via telephone to coordinate her care and needs.      I reviewed and discussed the details of that call along with the discharge summary, hospital problems, inpatient lab results, inpatient diagnostic studies, and consultation reports with Devon.     Current outpatient and discharge medications have been reconciled for the patient.    Date of TCM Phone Call 4/16/2022   [facility]   Date of Admission 4/14/2022   Date of Discharge 4/16/2022   Discharge Disposition Home or Self Care     Risk for Readmission (LACE) Score: 7 (4/16/2022  6:01 AM)       The following portions of the patient's history were reviewed and updated as appropriate: allergies, current medications, past family history, past medical history, past social history, past surgical history and problem list.    Visit Vitals  /62 (BP Location: Right arm, Patient Position: Sitting, Cuff Size: Small Adult)   Pulse 85   Temp 97.1 °F (36.2 °C) (Temporal)   Resp 14   Ht 160 cm (63\")   Wt 47.4 kg (104 lb 6.4 oz)   LMP  (LMP Unknown)   SpO2 99% Comment: Room air   BMI 18.49 kg/m² "         Current Outpatient Medications:   •  B Complex Vitamins (VITAMIN-B COMPLEX PO), Take 1 tablet by mouth Daily., Disp: , Rfl:   •  cefdinir (OMNICEF) 300 MG capsule, Take 1 capsule by mouth 2 (Two) Times a Day., Disp: 10 capsule, Rfl: 0  •  esomeprazole (NexIUM) 20 MG packet, Take 1 tablet by mouth Daily., Disp: , Rfl:   •  Multiple Vitamins-Minerals (MULTIVITAMIN ADULT PO), Take 1 tablet by mouth Daily., Disp: , Rfl:   •  nitroglycerin (NITROSTAT) 0.4 MG SL tablet, Place 1 tablet under the tongue Every 5 (Five) Minutes As Needed for Chest Pain. Take no more than 3 doses in 15 minutes., Disp: 30 tablet, Rfl: 12  •  simvastatin (ZOCOR) 40 MG tablet, Take 1 tablet by mouth Daily., Disp: 90 tablet, Rfl: 3  •  Xarelto 15 MG tablet, TAKE 1 TABLET BY MOUTH DAILY WITH DINNER, Disp: 30 tablet, Rfl: 1    Review of Systems   Constitutional: Positive for chills and fatigue. Negative for fever, unexpected weight change (but 10 lb wt loss with illness) and weight gain.   HENT: Negative for congestion.    Respiratory: Positive for shortness of breath. Negative for cough and chest tightness.    Cardiovascular: Positive for palpitations. Negative for chest pain, leg swelling and syncope.   Gastrointestinal: Negative for abdominal pain, anorexia, blood in stool, change in bowel habit, constipation, diarrhea, nausea and vomiting.   Genitourinary: Positive for frequency. Negative for dysuria, flank pain, hematuria (resolved) and urgency.   Skin: Negative for rash.   Neurological: Positive for dizziness and weakness.   Hematological: Negative for adenopathy. Does not bruise/bleed easily.       I have reviewed and confirmed the accuracy of the HPI and ROS as documented by the MA/LPN/RN Yelitza Mcdaniel MD      Objective   Physical Exam  Constitutional:       General: She is not in acute distress.     Appearance: She is ill-appearing.      Comments: Thin   Eyes:      Pupils: Pupils are equal, round, and reactive to light.   Neck:       Thyroid: No thyromegaly.      Vascular: No carotid bruit or JVD.   Cardiovascular:      Rate and Rhythm: Normal rate and regular rhythm.      Pulses:           Dorsalis pedis pulses are 2+ on the right side and 2+ on the left side.      Heart sounds: No murmur heard.  Pulmonary:      Effort: Pulmonary effort is normal.      Breath sounds: Normal breath sounds. No wheezing.   Abdominal:      General: Abdomen is flat. Bowel sounds are normal.      Palpations: Abdomen is soft. There is no mass.      Tenderness: There is no abdominal tenderness. There is no right CVA tenderness or left CVA tenderness.   Musculoskeletal:      Cervical back: Neck supple.      Right lower leg: No edema.      Left lower leg: No edema.   Lymphadenopathy:      Cervical: No cervical adenopathy.         Assessment/Plan   Problem List Items Addressed This Visit        High    MDS (myelodysplastic syndrome) ([facility])    Overview     Chronic anemia fairly stable at 9.2 04/16/2022. No Rx has been indicated.            Coronary artery disease involving autologous vein coronary bypass graft without angina pectoris    Overview     Overview:   S/p CABG x1 in 2003  Occasion brief episode of sharp CP fleeting No assoc nausea, vomiting or diaphoresis.            Paroxysmal atrial fibrillation ([facility])    Overview     sinus by asculatation. No recent break thru  RBBB on EKG 03/2019           Essential hypertension    Overview     Stable off meds. Continue lower salt diet              Low    Gastroesophageal reflux disease without esophagitis    Overview     No sxs on meds which are continued.               Unprioritized    Sensorineural hearing loss (SNHL) of both ears    Underweight    Overview     She and daughter encouraged to use Boost or Ensure with meals.   Her wt is slightly increased at 115.            YUDELKA (acute kidney injury) ([facility])    Overview     Creat 1.2 GFR 32 at d/c from F 04/16/2022, normal renal function 2/2021 repeat lab to be sure it  continues to improve             Other Visit Diagnoses     History of cystitis    -  Primary    Malaise and fatigue        neg exam, repeat lab to besure no metabolic issues. repeat urine cxs when urine available. Boost increase calories and fluids and follow.     Relevant Orders    CBC & Differential    Comprehensive Metabolic Panel    TSH    Protein-calorie malnutrition, unspecified severity (HCC)        Increase calories, Boost, fluids             There are no Patient Instructions on file for this visit.    I wore protective equipment throughout this patient encounter to include mask and eye protection. Hand hygiene was performed before donning protective equipment and after removal when leaving the room.

## 2022-04-26 NOTE — TELEPHONE ENCOUNTER
Caller: FABIEN LACEY    Relationship: Emergency Contact    Best call back number: [phone]    What is the best time to reach you: ANY TIME    Do you know the name of the person who called: HAYDEE    What was the call regarding: PATIENTS DAUGHTER CALLING TO LET HER KNOW THAT NOTHING HAS CHANGED THAT MUCH WITH HER MOTHER     Do you require a callback: YES

## 2022-04-27 NOTE — OUTREACH NOTE
Medical Week 2 Survey    Flowsheet Row Responses   [facility] patient discharged from? [facility]   Does the patient have one of the following disease processes/diagnoses(primary or secondary)? Other   Week 2 attempt successful? Yes   Call start time 1238   Discharge diagnosis Acute urinary tract infection   Call end time 1241   Meds reviewed with patient/caregiver? Yes   Is the patient having any side effects they believe may be caused by any medication additions or changes? No   Does the patient have all medications ordered at discharge? Yes   Is the patient taking all medications as directed (includes completed medication regime)? Yes   Does the patient have a primary care provider?  Yes   Does the patient have an appointment with their PCP within 7 days of discharge? Yes   Has the patient kept scheduled appointments due by today? Yes   Has home health visited the patient within 72 hours of discharge? N/A   Psychosocial issues? No   Did the patient receive a copy of their discharge instructions? Yes   Nursing interventions Reviewed instructions with patient   What is the patient's perception of their health status since discharge? Improving   Is the patient/caregiver able to teach back signs and symptoms related to disease process for when to call PCP? Yes   Is the patient/caregiver able to teach back signs and symptoms related to disease process for when to call 911? Yes   Is the patient/caregiver able to teach back the hierarchy of who to call/visit for symptoms/problems? PCP, Specialist, Home health nurse, Urgent Care, ED, 911 Yes   If the patient is a current smoker, are they able to teach back resources for cessation? Not a smoker   Additional teach back comments daughter states cystitis resolved, currently working to resolve thyroid imbalance   Week 2 Call Completed? Yes          BENNIE MEREDITH - Registered Nurse

## 2022-05-05 NOTE — OUTREACH NOTE
Medical Week 3 Survey    Flowsheet Row Responses   [facility] patient discharged from? [facility]   Does the patient have one of the following disease processes/diagnoses(primary or secondary)? Other   Week 3 attempt successful? Yes   Call start time 0957   Call end time 1000   Discharge diagnosis Acute urinary tract infection   Is patient permission given to speak with other caregiver? Yes   Person spoke with today (if not patient) and relationship daughter and patient   Meds reviewed with patient/caregiver? Yes   Is the patient having any side effects they believe may be caused by any medication additions or changes? No   Does the patient have all medications ordered at discharge? Yes   Is the patient taking all medications as directed (includes completed medication regime)? Yes   Does the patient have a primary care provider?  Yes   Does the patient have an appointment with their PCP within 7 days of discharge? Yes   Has the patient kept scheduled appointments due by today? Yes   Has home health visited the patient within 72 hours of discharge? N/A   Psychosocial issues? No   Did the patient receive a copy of their discharge instructions? Yes   Nursing interventions Reviewed instructions with patient   What is the patient's perception of their health status since discharge? Improving   Is the patient/caregiver able to teach back signs and symptoms related to disease process for when to call PCP? Yes   Is the patient/caregiver able to teach back the hierarchy of who to call/visit for symptoms/problems? PCP, Specialist, Home health nurse, Urgent Care, ED, 911 Yes   If the patient is a current smoker, are they able to teach back resources for cessation? Not a smoker   Week 3 Call Completed? Yes   Graduated Yes   Is the patient interested in additional calls from an ambulatory ?  NOTE:  applies to high risk patients requiring additional follow-up. No   Did the patient feel the follow up calls were  helpful during their recovery period? Yes   Was the number of calls appropriate? Yes   Graduated/Revoked comments Patient is doing well. Has had followup with Dr Mcdaniel. No further issues.          JOHNNY BOCANEGRA - Registered Nurse
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
## 2022-05-13 PROBLEM — N39.0 ACUTE UTI (URINARY TRACT INFECTION): Status: ACTIVE | Noted: 2022-01-01

## 2022-05-13 PROBLEM — R53.1 GENERAL WEAKNESS: Status: ACTIVE | Noted: 2022-01-01

## 2022-05-13 PROBLEM — E03.9 HYPOTHYROIDISM (ACQUIRED): Status: ACTIVE | Noted: 2022-01-01

## 2022-05-13 PROBLEM — T79.6XXA TRAUMATIC RHABDOMYOLYSIS (HCC): Status: ACTIVE | Noted: 2022-01-01

## 2022-05-17 PROBLEM — E43 SEVERE MALNUTRITION (HCC): Status: ACTIVE | Noted: 2022-01-01

## 2022-05-18 NOTE — ANESTHESIA PROCEDURE NOTES
Airway  Urgency: elective    Date/Time: 5/18/2022 4:14 PM  Airway not difficult    General Information and Staff    Patient location during procedure: OR  Anesthesiologist: Brandon Black MD    Indications and Patient Condition  Indications for airway management: CNS depression    Preoxygenated: yes  MILS maintained throughout  Mask difficulty assessment: 0 - not attempted    Final Airway Details  Final airway type: supraglottic airway      Successful airway: unique  Size 3    Number of attempts at approach: 1  Assessment: lips, teeth, and gum same as pre-op and atraumatic intubation

## 2022-05-18 NOTE — ANESTHESIA PREPROCEDURE EVALUATION
Anesthesia Evaluation     Patient summary reviewed and Nursing notes reviewed   no history of anesthetic complications:  NPO Solid Status: > 8 hours  NPO Liquid Status: > 2 hours           Airway   Mallampati: I  TM distance: >3 FB  Neck ROM: full  No difficulty expected  Dental - normal exam     Pulmonary - normal exam   (+) COPD severe, asthma,home oxygen,   Cardiovascular - normal exam    ECG reviewed    (+) hypertension, CAD, CABG >6 Months, dysrhythmias Atrial Fib, DVT resolved, hyperlipidemia,     ROS comment: Echo yesterday showed normal ef and valves    Neuro/Psych  (+) syncope,      ROS Comment: When not having delirium from UTIs, patient is mentally normal and lives alone.  Currently AAO x3, but very [place] and does not have hearing aids in.  H&P from daughter/POA  GI/Hepatic/Renal/Endo    (+)  GERD,  renal disease CRI, thyroid problem hypothyroidism    Musculoskeletal     Abdominal  - normal exam   Substance History      OB/GYN          Other   arthritis, blood dyscrasia,   history of cancer                    Anesthesia Plan    ASA 4     general     intravenous induction     Anesthetic plan, all risks, benefits, and alternatives have been provided, discussed and informed consent has been obtained with: patient.        CODE STATUS:    Level Of Support Discussed With: Patient  Code Status (Patient has no pulse and is not breathing): No CPR (Do Not Attempt to Resuscitate)  Medical Interventions (Patient has pulse or is breathing): Comfort Measures

## 2022-05-18 NOTE — ANESTHESIA POSTPROCEDURE EVALUATION
Patient: Devon Crane    Procedure Summary     Date: 05/18/22 Room / Location: [facility] OR 01 / [facility] MAIN OR    Anesthesia Start: 1605 Anesthesia Stop: 1650    Procedure: CYSTOSCOPY BLADDER BIOPSY WITH FULGURATION, with pylogram (N/A ) Diagnosis:     Surgeons: Gera Bush MD Provider: Brandon Black MD    Anesthesia Type: general ASA Status: 4          Anesthesia Type: general    Vitals  Vitals Value Taken Time   /55 05/18/22 1733   Temp 98 °F (36.7 °C) 05/18/22 1650   Pulse 72 05/18/22 1733   Resp 22 05/18/22 1720   SpO2 100 % 05/18/22 1733   Vitals shown include unvalidated device data.        Post Anesthesia Care and Evaluation    Patient location during evaluation: PACU  Patient participation: complete - patient participated  Level of consciousness: awake  Pain scale: See nurse's notes for pain score.  Pain management: adequate  Airway patency: patent  Anesthetic complications: No anesthetic complications  PONV Status: none  Cardiovascular status: acceptable  Respiratory status: acceptable  Hydration status: acceptable    Comments: Patient seen and examined postoperatively; vital signs stable; SpO2 greater than or equal to 90%; cardiopulmonary status stable; nausea/vomiting adequately controlled; pain adequately controlled; no apparent anesthesia complications; patient discharged from anesthesia care when discharge criteria were met

## 2022-05-30 NOTE — TELEPHONE ENCOUNTER
Per dr mcdowell he wants the patient to start on xarelto 15 mg I sent into pharmacy   cognitive limitations

## 2022-06-06 NOTE — TELEPHONE ENCOUNTER
Spoke with ,Mecca  regarding  Ardella , Mecca has many question regarding mother care at [facility] , told her would be best to discuss issues with doctor or N.P. that is caring for her mother.Mecca reports mother was having tight ness in chest on Thursday not today.

## 2022-06-06 NOTE — TELEPHONE ENCOUNTER
Caller: FABIEN LACEY    Relationship: Emergency Contact    Best call back number: [phone]    What is the best time to reach you: ANY     Who are you requesting to speak with (clinical staff, provider,  specific staff member):CANDANCE EMBRY        What was the call regarding: PATIENT HAD A SERVE UTI A MONTH AGO AND PATIENT HAS BEEN IN  [facility] REHAB FOR ABOUT 3 WEEKS AND THE REHAB STATES THE PATIENT HAS COVID BUT SHE IS NOT EXPERIENCING ANY SYMPTOMS  BUT THE REHAB FACILITY HAS STATED SHE IS HAVING TIGHTNESS IN HER LUNGS.    THE REHAB HASN'T DONE ANYTHING IN REGARDS TO THE TIGHTNESS IN HER LUNGS.       Do you require a callback: YES

## 2022-06-06 NOTE — TELEPHONE ENCOUNTER
PATIENT'S DAUGHTER FABIEN CALLED BACK AND STATES PATIENT'S REHAB [facility] IS STATING SHE IS EXPERIENCING TIGHTNESS OF THE CHEST.   ADVISED HER TO GO TO ER. SHE IS HAVING IMMUNE THERAPY FOR HER BLADDER CANCER IN 2 WEEKS.  ZARA WILL TAKE HER TO [facility] [place], operated by [facility] FOR TIGHTNESS OF CHEST.     SHE IS IN ISOLATION AT REHAB FOR 10-15 DAYS. SHE IS NOT SHOWING ANY SYMPTOMS OF COVID. THEY DID DO A RAPID TEST AND CAME BACK POSITIVE ON 5/28/22.    CALL BACK NUMBER [phone]

## 2022-06-07 NOTE — TELEPHONE ENCOUNTER
Caller: FABIEN LACEY    Relationship to patient: Emergency Contact    Best call back number: [phone]    Chief complaint: PATIENT TO CANCEL 6/14/22 AND 6/22/22 APPTS AS PATIENT IS CURRENTLY IN REHAB FACILITY    PATIENT ALSO HAS A NEW DX OF BLADDER CANCER AND IS BEING TREATED BY A DIFFERENT PROVIDER.

## 2022-06-10 PROBLEM — E87.1 ACUTE HYPONATREMIA: Status: ACTIVE | Noted: 2022-01-01

## 2022-06-10 NOTE — ED PROVIDER NOTES
Subjective   89-year-old female presents with daughter at bedside from [facility] for recurrent UTI, 3 times since April.  Daughter also reports that she has a low sodium and has been diagnosed with bladder cancer.  She reports that she does have symptoms of confusion.  She has been diagnosed with COVID-19 on 5/27/2022 despite having Moderna series to completion and booster in October 26, 2021.  EMS gave her 2 L normal saline.     1. Location: Generalized  2. Quality: Confusion   3. Severity: Mild  4. Worsening factors: Hyponatremia  5. Alleviating factors: Denies  6. Onset: Today  7. Radiation: Denies  8. Frequency: Intermittent  9. Co-morbidities: Past Medical History:  No date: Abnormal ECG  4/16/2022: YUDELKA (acute kidney injury) (Formerly [facility])  No date: Anemia  2019: Asthma  No date: CAD (coronary artery disease)  No date: Congenital heart disease  No date: COPD (chronic obstructive pulmonary disease) (Formerly [facility])  No date: Deep vein thrombosis ([facility]ly [facility])  No date: Dyslipidemia  No date: GERD (gastroesophageal reflux disease)  No date: Hearing loss  No date: Hyperlipidemia  No date: Hypertension  No date: MDS (myelodysplastic syndrome) (Formerly [facility])  No date: Osteoarthritis  No date: Oxygen dependent  No date: Paroxysmal A-fib (Formerly [facility])  No date: Presence of pessary  No date: 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 Source: Daughter            Review of Systems   Constitutional: Positive for appetite change. Negative for chills, diaphoresis and fever.   HENT: Negative for ear discharge and rhinorrhea.    Eyes: Negative for photophobia, pain and visual disturbance.   Respiratory: Negative for shortness of breath.    Cardiovascular: Negative for chest pain.   Gastrointestinal: Negative for abdominal pain, diarrhea, nausea and vomiting.   Endocrine: Negative for polydipsia and polyuria.   Musculoskeletal:  Negative for back pain.   Skin: Negative for color change, pallor and rash.   Allergic/Immunologic: Negative for immunocompromised state.   Neurological: Negative for dizziness, speech difficulty, weakness, numbness and headaches.   Psychiatric/Behavioral: Negative for agitation and confusion.   All other systems reviewed and are negative.      Past Medical History:   Diagnosis Date   • Abnormal ECG    • YUDELKA (acute kidney injury) ([facility]) 4/16/2022   • Anemia    • Asthma 2019   • CAD (coronary artery disease)    • Congenital heart disease    • COPD (chronic obstructive pulmonary disease) ([facility])    • Deep vein thrombosis ([facility])    • Dyslipidemia    • GERD (gastroesophageal reflux disease)    • Hearing loss    • Hyperlipidemia    • Hypertension    • MDS (myelodysplastic syndrome) ([facility])    • Osteoarthritis    • Oxygen dependent    • Paroxysmal A-fib ([facility])    • Presence of pessary    • Prolapse of female bladder, acquired    • Pulmonary hypertension ([facility])    • Pulmonary hypertension ([facility])    • Syncope    • Vaginal bleeding        Allergies   Allergen Reactions   • Bactrim [Sulfamethoxazole-Trimethoprim] Other (See Comments)     Acute renal failure   • Diphenhydramine Swelling     Unknown.        Past Surgical History:   Procedure Laterality Date   • APPENDECTOMY  05/1947   • BONE MARROW BIOPSY  07/10/2019   • CARDIAC CATHETERIZATION  02/19/2003, 06/22/2004, 01/16/2007, 12/2010, 10/06/2014,05/27/2018   • CARDIAC VALVE REPLACEMENT     • CHOLECYSTECTOMY  02/06/2007   • CORONARY ANGIOPLASTY     • CORONARY ARTERY BYPASS GRAFT  2013   • CYSTOCELE REPAIR  05/1990   • CYSTOSCOPY BLADDER BIOPSY N/A 5/18/2022    Procedure: CYSTOSCOPY BLADDER BIOPSY WITH FULGURATION, with pylogram;  Surgeon: Gera Bush MD;  Location: [facility];  Service: Urology;  Laterality: N/A;   • EYE LENS IMPLANT SECONDARY  04/2000, 05/2000   • HYSTERECTOMY  04/1963   • KNEE CARTILAGE SURGERY Right 01/10/2001   • SKIN CANCER EXCISION      head and face        Family History   Problem Relation Age of Onset   • Cancer Sister    • Heart disease Sister    • Cancer Brother    • Heart disease Brother    • Diabetes Brother    • Heart disease Mother    • Stroke Mother    • Heart disease Father    • Heart disease Brother        Social History     Socioeconomic History   • Marital status:    Tobacco Use   • Smoking status: Never Smoker   • Smokeless tobacco: Never Used   Vaping Use   • Vaping Use: Never used   Substance and Sexual Activity   • Alcohol use: No   • Drug use: No   • Sexual activity: Not Currently     Partners: Male     Birth control/protection: None           Objective   Physical Exam  Vitals and nursing note reviewed.   Constitutional:       General: She is awake. She is not in acute distress.     Appearance: Normal appearance. She is well-developed and normal weight. She is not ill-appearing or diaphoretic.   HENT:      Head: Normocephalic and atraumatic.      Right Ear: External ear normal.      Left Ear: External ear normal.      Nose: Nose normal.      Mouth/Throat:      Mouth: Mucous membranes are moist.      Pharynx: Oropharynx is clear.   Eyes:      Extraocular Movements: Extraocular movements intact.      Conjunctiva/sclera: Conjunctivae normal.      Pupils: Pupils are equal, round, and reactive to light.   Neck:      Trachea: Trachea and phonation normal.      Meningeal: Brudzinski's sign and Kernig's sign absent.   Cardiovascular:      Rate and Rhythm: Normal rate and regular rhythm.      Pulses: Normal pulses.           Radial pulses are 2+ on the right side and 2+ on the left side.        Dorsalis pedis pulses are 2+ on the right side and 2+ on the left side.        Posterior tibial pulses are 2+ on the right side and 2+ on the left side.      Heart sounds: Normal heart sounds, S1 normal and S2 normal. Heart sounds not distant. No murmur heard.    No friction rub. No gallop.   Pulmonary:      Effort: Pulmonary effort is normal.      Breath  sounds: Normal breath sounds and air entry.   Abdominal:      General: Abdomen is flat. Bowel sounds are normal.      Palpations: Abdomen is soft.      Tenderness: There is no abdominal tenderness.   Musculoskeletal:         General: Normal range of motion.      Cervical back: Full passive range of motion without pain, normal range of motion and neck supple. No erythema or rigidity. No spinous process tenderness. Normal range of motion.   Skin:     General: Skin is warm and dry.      Capillary Refill: Capillary refill takes less than 2 seconds.   Neurological:      Mental Status: She is alert and oriented to person, place, and time.      GCS: GCS eye subscore is 4. GCS verbal subscore is 5. GCS motor subscore is 6.      Motor: No abnormal muscle tone.   Psychiatric:         Mood and Affect: Mood normal.         Behavior: Behavior normal. Behavior is cooperative.         Thought Content: Thought content normal.         Judgment: Judgment normal.         Procedures      Sinus rhythm with right bundle branch block rate of 71.  Compared to previous EKG from 5/13/2022 sinus rhythm right bundle branch block and left posterior fascicular block rate of 65.  Interpreted Dr. Rosenthal and reviewed by me.         ED Course  ED Course as of 06/10/22 1640   Fri Brayden 10, 2022   1610 Awaiting CMP results. [AL]      ED Course User Index  [AL] Kassie Molina, APRN    No radiology results for the last day  Medications   sodium chloride 0.9 % flush 10 mL (has no administration in time range)   cefTRIAXone (ROCEPHIN) 1 g in sodium chloride 0.9 % 100 mL IVPB (has no administration in time range)     Labs Reviewed   COMPREHENSIVE METABOLIC PANEL - Abnormal; Notable for the following components:       Result Value    Sodium 124 (*)     Chloride 90 (*)     All other components within normal limits    Narrative:     GFR Normal >60  Chronic Kidney Disease <60  Kidney Failure <15     URINALYSIS W/ MICROSCOPIC IF INDICATED (NO CULTURE) - Abnormal;  Notable for the following components:    Blood, UA Trace (*)     Leuk Esterase, UA Large (3+) (*)     All other components within normal limits   CBC WITH AUTO DIFFERENTIAL - Abnormal; Notable for the following components:    RBC 2.62 (*)     Hemoglobin 9.2 (*)     Hematocrit 25.9 (*)     MCV 99.1 (*)     MCH 35.1 (*)     All other components within normal limits   URINALYSIS, MICROSCOPIC ONLY - Abnormal; Notable for the following components:    RBC, UA 0-2 (*)     WBC, UA Too Numerous to Count (*)     All other components within normal limits   CBC AND DIFFERENTIAL    Narrative:     The following orders were created for panel order CBC & Differential.  Procedure                               Abnormality         Status                     ---------                               -----------         ------                     CBC Auto Differential[[id number]]        Abnormal            Final result                 Please view results for these tests on the individual orders.     Urine Culture >100,000 CFU/mL Escherichia coli Abnormal             Resulting Agency: [facility]       Susceptibility     Escherichia coli     CASSANDRA     Ampicillin <=2 ug/ml Susceptible     Ampicillin + Sulbactam <=2 ug/ml Susceptible     Cefazolin <=4 ug/ml Susceptible     Cefepime <=1 ug/ml Susceptible     Ceftazidime <=1 ug/ml Susceptible     Ceftriaxone <=1 ug/ml Susceptible     Gentamicin <=1 ug/ml Susceptible     Levofloxacin <=0.12 ug/ml Susceptible     Nitrofurantoin <=16 ug/ml Susceptible     Piperacillin + Tazobactam <=4 ug/ml Susceptible     Trimethoprim + Sulfamethoxazole <=20 ug/ml Susceptible               Linear View         Specimen Collected: 05/13/22 21:09 Last Resulted: 05/15/22 09:54                                                           MDM  Number of Diagnoses or Management Options  Acute cystitis with hematuria  Acute hyponatremia  Diagnosis management comments: Chart Review: 6/1/2022 patient was seen by oncology for  bladder lesion.  Comorbidity: Past Medical History:  No date: Abnormal ECG  4/16/2022: YUDELKA (acute kidney injury) ([facility])  No date: Anemia  2019: Asthma  No date: CAD (coronary artery disease)  No date: Congenital heart disease  No date: COPD (chronic obstructive pulmonary disease) ([facility])  No date: Deep vein thrombosis ([facility])  No date: Dyslipidemia  No date: GERD (gastroesophageal reflux disease)  No date: Hearing loss  No date: Hyperlipidemia  No date: Hypertension  No date: MDS (myelodysplastic syndrome) ([facility])  No date: Osteoarthritis  No date: Oxygen dependent  No date: Paroxysmal A-fib ([facility])  No date: Presence of pessary  No date: 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  Appropriate PPE worn during patient exam.  Patient is alert and oriented x3.  She has no complaints.  S1-S2 heart sounds on exam.  Lungs are clear to auscultation.  IV established and labs obtained. CBC is at her baseline.  Sodium is decreased significantly from previous 140, currently 124.  Patient has had 2 L normal saline.  Urine was significant for large leuk esterase trace blood too numerous white cells.  Patient will be admitted for hyponatremia and recurrent UTI.  Patient's previous urine culture was significant for E. coli.  Patient was given Rocephin 1 g IV.  The hospitalist was paged for admission.  Patient will be admitted to Dr. Castrejon.    Disposition/Treatment: Discussed results with patient, verbalized understanding.  Agreeable with plan of care.  Patient was stable upon admission.       Part of this note may be an electronic transcription/translation of spoken language to printed text using the Dragon Dictation System.            Amount and/or Complexity of Data Reviewed  Clinical lab tests: reviewed  Tests in the medicine section of CPT®: reviewed  Decide to obtain  previous medical records or to obtain history from someone other than the patient: yes    Patient Progress  Patient progress: stable      Final diagnoses:   Acute hyponatremia   Acute cystitis with hematuria       ED Disposition  ED Disposition     ED Disposition   Decision to Admit    Condition   --    Comment   --             No follow-up provider specified.       Medication List      No changes were made to your prescriptions during this visit.          Kassie Molina, APRN  06/10/22 1640

## 2022-06-11 NOTE — PLAN OF CARE
Goal Outcome Evaluation:  Plan of Care Reviewed With: patient           Outcome Evaluation: Pt has rested comfortably throughout the night,no c/o pain or discomfort. Currently being treated for UTI and hyponatremia. IVF currently infusing, will continue to monitor.

## 2022-06-11 NOTE — H&P
[facility]      Patient Name: Devon Crane  : 1932  MRN: [id number]  Primary Care Physician:  Yelitza Mcdaniel MD  Date of admission: 6/10/2022      Subjective      Chief Complaint: Feeling poorly    History of Present Illness: Devon Crane is a 89 y.o. female who presented to [facility] on 6/10/2022 complaining of not feeling well.  The patient has had 3 urinary tract infections now in the past month and a half.  She has been hospitalized now for all 3.  She did have a E. coli that was cultured out that was pansensitive.  The patient has not had any fever or chills she just has had a little bit of feeling unwell and decreased appetite and p.o. intake.  She also has not been as active as she normally is.  She has been in the hospital twice now and was at [facility] rehab in order to get strong enough to go back to her own home which is [place] to the daughter who was present at the time of my interview.  The patient tells me that she is feeling okay now and not having problems.      Review of Systems   Constitutional: Positive for decreased appetite and malaise/fatigue.   HENT: Negative.    Eyes: Negative.    Cardiovascular: Negative.    Respiratory: Negative.    Endocrine: Negative.    Hematologic/Lymphatic: Negative.    Skin: Negative.    Musculoskeletal: Positive for myalgias.   Gastrointestinal: Negative.    Genitourinary: Negative.    Neurological: Negative.    Psychiatric/Behavioral: Negative.    Allergic/Immunologic: Negative.    All other systems reviewed and are negative.       Personal History     Past Medical History:   Diagnosis Date   • Abnormal ECG    • YUDELKA (acute kidney injury) (formerly [facility]) 2022   • Anemia    • Asthma 2019   • CAD (coronary artery disease)    • Congenital heart disease    • COPD (chronic obstructive pulmonary disease) (formerly [facility])    • Deep vein thrombosis (formerly [facility])    • Dyslipidemia    • GERD (gastroesophageal reflux  disease)    • Hearing loss    • Hyperlipidemia    • Hypertension    • MDS (myelodysplastic syndrome) (HCC)    • Osteoarthritis    • Oxygen dependent    • Paroxysmal A-fib (HCC)    • Presence of pessary    • Prolapse of female bladder, acquired    • Pulmonary hypertension (HCC)    • Pulmonary hypertension (HCC)    • Syncope    • Vaginal bleeding        Past Surgical History:   Procedure Laterality Date   • APPENDECTOMY  05/1947   • BONE MARROW BIOPSY  07/10/2019   • CARDIAC CATHETERIZATION  02/19/2003, 06/22/2004, 01/16/2007, 12/2010, 10/06/2014,05/27/2018   • CARDIAC VALVE REPLACEMENT     • CHOLECYSTECTOMY  02/06/2007   • CORONARY ANGIOPLASTY     • CORONARY ARTERY BYPASS GRAFT  2013   • CYSTOCELE REPAIR  05/1990   • CYSTOSCOPY BLADDER BIOPSY N/A 5/18/2022    Procedure: CYSTOSCOPY BLADDER BIOPSY WITH FULGURATION, with pylogram;  Surgeon: Gera Bush MD;  Location: [facility] OR;  Service: Urology;  Laterality: N/A;   • EYE LENS IMPLANT SECONDARY  04/2000, 05/2000   • HYSTERECTOMY  04/1963   • KNEE CARTILAGE SURGERY Right 01/10/2001   • SKIN CANCER EXCISION      head and face       Family History: family history includes Cancer in her brother and sister; Diabetes in her brother; Heart disease in her brother, brother, father, mother, and sister; Stroke in her mother. Otherwise pertinent FHx was reviewed and not pertinent to current issue.    Social History:  reports that she has never smoked. She has never used smokeless tobacco. She reports that she does not drink alcohol and does not use drugs.    Home Medications:  Prior to Admission Medications     Prescriptions Last Dose Informant Patient Reported? Taking?    Folic Acid-Vit B6-Vit B12 (B COMPLEX-FOLIC ACID PO) 6/10/2022  Yes Yes    Take 1 tablet by mouth Daily.    ipratropium-albuterol (DUO-NEB) 0.5-2.5 mg/3 ml nebulizer   Yes Yes    Take 3 mL by nebulization Every 4 (Four) Hours As Needed for Shortness of Air.    levothyroxine (SYNTHROID, LEVOTHROID) 50 MCG  tablet 6/10/2022  Yes Yes    Take 50 mcg by mouth Every Morning.    linezolid (ZYVOX) 600 MG tablet 6/10/2022  Yes Yes    Take 600 mg by mouth 2 (Two) Times a Day.    Multiple Vitamins-Minerals (MULTIVITAMIN ADULT PO) 6/10/2022  Yes Yes    Take 1 tablet by mouth Daily.    nitroglycerin (NITROSTAT) 0.4 MG SL tablet   No Yes    Place 1 tablet under the tongue Every 5 (Five) Minutes As Needed for Chest Pain. Take no more than 3 doses in 15 minutes.    omeprazole (priLOSEC) 20 MG capsule 6/10/2022  Yes Yes    Take 20 mg by mouth Daily.    simvastatin (ZOCOR) 40 MG tablet 6/9/2022  Yes Yes    Take 40 mg by mouth Every Night.    Xarelto 15 MG tablet 6/10/2022  No Yes    TAKE 1 TABLET BY MOUTH DAILY WITH DINNER            Allergies:  Allergies   Allergen Reactions   • Bactrim [Sulfamethoxazole-Trimethoprim] Other (See Comments)     Acute renal failure   • Diphenhydramine Swelling     Unknown.        Objective      Vitals:   Temp:  [97.9 °F (36.6 °C)] 97.9 °F (36.6 °C)  Heart Rate:  [66-75] 75  Resp:  [16] 16  BP: (114-128)/(65-74) 127/65  Flow (L/min):  [2] 2    Physical Exam  Vitals and nursing note reviewed.   Constitutional:       General: She is not in acute distress.     Appearance: She is well-developed. She is not ill-appearing, toxic-appearing or diaphoretic.      Comments: The patient is slightly pale and very thin in her appearance.  She is quite sharp mentally and can answer questions quickly.  She does not have a hearing impediment either.   HENT:      Head: Normocephalic and atraumatic.      Right Ear: Ear canal and external ear normal.      Left Ear: Ear canal and external ear normal.      Nose: Nose normal. No congestion or rhinorrhea.      Mouth/Throat:      Mouth: Mucous membranes are moist.      Pharynx: No oropharyngeal exudate.   Eyes:      General: No scleral icterus.        Right eye: No discharge.         Left eye: No discharge.      Extraocular Movements: Extraocular movements intact.       Conjunctiva/sclera: Conjunctivae normal.      Pupils: Pupils are equal, round, and reactive to light.   Neck:      Thyroid: No thyromegaly.      Vascular: No carotid bruit or JVD.      Trachea: No tracheal deviation.   Cardiovascular:      Rate and Rhythm: Normal rate and regular rhythm.      Pulses: Normal pulses.      Heart sounds: Normal heart sounds. No murmur heard.    No friction rub. No gallop.   Pulmonary:      Effort: Pulmonary effort is normal. No respiratory distress.      Breath sounds: No stridor. No wheezing, rhonchi or rales.      Comments: Decreased breath sounds in the bases  Chest:      Chest wall: No tenderness.   Abdominal:      General: Bowel sounds are normal. There is no distension.      Palpations: Abdomen is soft. There is no mass.      Tenderness: There is no abdominal tenderness. There is no guarding or rebound.      Hernia: No hernia is present.   Musculoskeletal:         General: No swelling, tenderness, deformity or signs of injury. Normal range of motion.      Cervical back: Normal range of motion and neck supple. No rigidity. No muscular tenderness.      Right lower leg: No edema.      Left lower leg: No edema.   Lymphadenopathy:      Cervical: No cervical adenopathy.   Skin:     General: Skin is warm and dry.      Coloration: Skin is not jaundiced or pale.      Findings: No bruising, erythema or rash.   Neurological:      General: No focal deficit present.      Mental Status: She is alert and oriented to person, place, and time. Mental status is at baseline.      Cranial Nerves: No cranial nerve deficit.      Sensory: No sensory deficit.      Motor: No weakness or abnormal muscle tone.      Coordination: Coordination normal.   Psychiatric:         Mood and Affect: Mood normal.         Behavior: Behavior normal.         Thought Content: Thought content normal.         Judgment: Judgment normal.          Result Review    Result Review:  I have personally reviewed the results from the  "time of this admission to 6/10/2022 20:26 EDT and agree with these findings:  [x]  Laboratory  [x]  Microbiology  [x]  Radiology  []  EKG/Telemetry   []  Cardiology/Vascular   []  Pathology  []  Old records  []  Other:  Most notable findings include:     Assessment & Plan        Active Hospital Problems:  Active Hospital Problems    Diagnosis    • Acute hyponatremia      Plan:   1.  Asymptomatic COVID-19  -Patient is normally on 2 L/min per nasal cannula of oxygen and still is.  She has not had any desaturations.  -The patient has been positive for approximately 10 to 11 days according to the family.  She is out of the window for treatment for COVID and she is asymptomatic without any issues related to this  -We will isolate as per hospital protocol    2.  Urinary tract infection  -Patient most recently had a urinary tract infection with E. coli.  It was pansensitive.  Rocephin has been started and we will continue it until culture data dictates otherwise.  -Cautious hydration given that the patient's sodium is 124.    3.  Hyponatremia  -Is not the patient's first bout of hyponatremia.  The last 2 times that she was hospitalized her sodium was below 125.  The patient's urinary sodium was 26.  We will cautiously give normal saline rechecking her basic metabolic profile in the a.m.  -Consider nephrology consult if this continues to be a problem    4.  New diagnosis of \"aggressive\" bladder cancer  -The patient's daughter explained to me that they are considering doing aggressive radiation therapy if her mother is strong enough for it.  They have not started treatment and have meeting set up in the near future to better deal with this.    In the presence of the patient's daughter I did ask her mother who was completely lucid and competent whether or not she wanted CPR, cardioversion, chemical code or mechanical ventilation.  Her mother is adamant that she wants none of it and her CODE STATUS order has been placed to " reflect her wishes.    DVT prophylaxis:  No DVT prophylaxis order currently exists.    CODE STATUS:    Level Of Support Discussed With: Patient  Code Status (Patient has no pulse and is not breathing): No CPR (Do Not Attempt to Resuscitate)  Medical Interventions (Patient has pulse or is breathing): Full Support    Admission Status:  I believe this patient meets  status.    I discussed the patient's findings and my recommendations with patient who agrees to proceed as outlined above.    This patient has been examined wearing appropriate Personal Protective Equipment and discussed with hospital infection control department. 06/10/22      Signature: Electronically signed by Michelle Castrejon MD, 06/10/22, 8:27 PM EDT.

## 2022-06-11 NOTE — PROGRESS NOTES
[facility] Medicine Services Daily Progress Note    Patient Name: Devon Crane  : 1932  MRN: [id number]  Primary Care Physician:  Yelitza Mcdaniel MD  Date of admission: 6/10/2022      Subjective      Chief Complaint: [place] unwell with hyponatremia.    Review of Systems   Constitutional: Positive for decreased appetite.   HENT: Negative.    Eyes: Negative.    Cardiovascular: Negative.    Respiratory: Negative.    Endocrine: Negative.    Hematologic/Lymphatic: Negative.    Skin: Negative.    Musculoskeletal: Negative.    Gastrointestinal: Negative.    Genitourinary: Positive for frequency.   Neurological: Positive for weakness.   Psychiatric/Behavioral: Negative.    Allergic/Immunologic: Negative.    All other systems reviewed and are negative.         Objective      Vitals:   Temp:  [97.5 °F (36.4 °C)-98 °F (36.7 °C)] 97.5 °F (36.4 °C)  Heart Rate:  [66-77] 77  Resp:  [16] 16  BP: (107-131)/(59-71) 126/64  Flow (L/min):  [1.5-2] 1.5    Physical Exam  Vitals and nursing note reviewed.   Constitutional:       General: She is not in acute distress.     Appearance: Normal appearance. She is well-developed. She is not ill-appearing, toxic-appearing or diaphoretic.      Comments: The patient is very appropriate.  She is very slender and a bit on the pale side.  She is conversational and appropriate.   HENT:      Head: Normocephalic and atraumatic.      Right Ear: Ear canal and external ear normal.      Left Ear: Ear canal and external ear normal.      Nose: Nose normal. No congestion or rhinorrhea.      Mouth/Throat:      Mouth: Mucous membranes are moist.      Pharynx: No oropharyngeal exudate.   Eyes:      General: No scleral icterus.        Right eye: No discharge.         Left eye: No discharge.      Extraocular Movements: Extraocular movements intact.      Conjunctiva/sclera: Conjunctivae normal.      Pupils: Pupils are equal, round, and reactive to light.   Neck:      Thyroid: No  thyromegaly.      Vascular: No carotid bruit or JVD.      Trachea: No tracheal deviation.   Cardiovascular:      Rate and Rhythm: Normal rate and regular rhythm.      Pulses: Normal pulses.      Heart sounds: Normal heart sounds. No murmur heard.    No friction rub. No gallop.   Pulmonary:      Effort: Pulmonary effort is normal. No respiratory distress.      Breath sounds: Normal breath sounds. No stridor. No wheezing, rhonchi or rales.   Chest:      Chest wall: No tenderness.   Abdominal:      General: Bowel sounds are normal. There is no distension.      Palpations: Abdomen is soft. There is no mass.      Tenderness: There is no abdominal tenderness. There is no guarding or rebound.      Hernia: No hernia is present.   Musculoskeletal:         General: No swelling, tenderness, deformity or signs of injury. Normal range of motion.      Cervical back: Normal range of motion and neck supple. No rigidity. No muscular tenderness.      Right lower leg: No edema.      Left lower leg: No edema.   Lymphadenopathy:      Cervical: No cervical adenopathy.   Skin:     General: Skin is warm and dry.      Coloration: Skin is not jaundiced or pale.      Findings: No bruising, erythema or rash.   Neurological:      General: No focal deficit present.      Mental Status: She is alert and oriented to person, place, and time. Mental status is at baseline.      Cranial Nerves: No cranial nerve deficit.      Sensory: No sensory deficit.      Motor: No weakness or abnormal muscle tone.      Coordination: Coordination normal.   Psychiatric:         Mood and Affect: Mood normal.         Behavior: Behavior normal.         Thought Content: Thought content normal.         Judgment: Judgment normal.             Result Review    Result Review:  I have personally reviewed the results from the time of this admission to 6/11/2022 15:51 EDT and agree with these findings:  [x]  Laboratory  [x]  Microbiology  [x]  Radiology  []  EKG/Telemetry   []   Cardiology/Vascular   []  Pathology  []  Old records  []  Other:  Most notable findings include:     Wounds (last 24 hours)     LDA Wound     Row Name 06/11/22 0025 06/11/22 0018 06/10/22 2153       Wound 06/10/22 2210 medial coccyx Pressure Injury    Wound - Properties Group Placement Date: 06/10/22  -DW Placement Time: 2210 -DW Present on Hospital Admission: Y  -DW Orientation: medial  -DW Location: coccyx  -DW Primary Wound Type: Pressure inj  -DW    Wound Image View All Images -- View Images  -MG --    Pressure Injury Stage -- -- 1  -DW    Dressing Appearance -- -- open to air;no drainage  -DW    Closure -- -- Open to air  -DW    Base -- -- dry;clean;red  -DW    Periwound -- -- dry  -DW    Periwound Temperature -- -- warm  -DW    Periwound Skin Turgor -- -- soft  -DW    Edges -- -- irregular  -DW    Dressing Care dressing changed  -MG -- dressing applied  meplex applied  -DW    Retired Wound - Properties Group Placement Date: 06/10/22  -DW Placement Time: 2210 -DW Present on Hospital Admission: Y  -DW Orientation: medial  -DW Location: coccyx  -DW Primary Wound Type: Pressure inj  -DW    Retired Wound - Properties Group Date first assessed: 06/10/22  -DW Time first assessed: 2210 -DW Present on Hospital Admission: Y  -DW Location: coccyx  -DW Primary Wound Type: Pressure inj  -DW          User Key  (r) = Recorded By, (t) = Taken By, (c) = Cosigned By    Initials Name Provider Type    Joann Stephens LPN Licensed Nurse    Lizette Burrell RN Registered Nurse                  Assessment & Plan      Brief Patient Summary:  Devon Crane is a 89 y.o. female who was in an inpatient rehab center who developed increasing problems with not feeling well.  The patient has recently been diagnosed with an aggressive form of bladder cancer and has had 3 hospitalizations for urinary tract infection she does not seem to be able to shake.  The patient had hyponatremia and was transferred from the rehab center to  [facility].  The patient is feeling better today but remains sort of quiet.  Her daughter is at the bedside and was updated.      atorvastatin, 10 mg, Oral, Daily  cefTRIAXone, 1 g, Intravenous, Once  lansoprazole, 30 mg, Nasogastric, QAM  levothyroxine, 50 mcg, Oral, Daily  linezolid, 600 mg, Oral, Q12H  multivitamin with minerals, 1 tablet, Oral, Daily  rivaroxaban, 15 mg, Oral, Daily With Dinner  sodium chloride, 10 mL, Intravenous, Q12H       sodium chloride, 50 mL/hr, Last Rate: 50 mL/hr (06/11/22 1523)         Active Hospital Problems:  Active Hospital Problems    Diagnosis    • Acute hyponatremia      Plan:   1.  Asymptomatic COVID-19  -Patient is normally on 2 L/min per nasal cannula of oxygen and still is.  She has not had any desaturations.  -The patient has been positive for approximately 10 to 11 days according to the family.  She is out of the window for treatment for COVID and she is asymptomatic without any issues related to this  -We will isolate as per hospital protocol     2.  Urinary tract infection  -Patient most recently had a urinary tract infection with E. coli.  It was pansensitive.  Rocephin has been started and we will continue it until culture data dictates otherwise.  No positive cultures today.  -Cautious hydration given that the patient's sodium is 124.     3.  Hyponatremia  -Is not the patient's first bout of hyponatremia.  The last 2 times that she was hospitalized her sodium was below 125.  The patient's urinary sodium was 26.  We will cautiously give normal saline rechecking her basic metabolic profile in the a.m.  -Consider nephrology consult if this continues to be a problem       -patient serum sodium is up to 131.  We will decrease her IV fluids and recheck in the a.m.    4.  New diagnosis of \"aggressive\" bladder cancer  -The patient's daughter explained to me that they are considering doing aggressive radiation therapy if her mother is strong enough for " it.  They have not started treatment and have meeting set up in the near future to better deal with this.     In the presence of the patient's daughter I did ask her mother who was completely lucid and competent whether or not she wanted CPR, cardioversion, chemical code or mechanical ventilation.  Her mother is adamant that she wants none of it and her CODE STATUS order has been placed to reflect her wishes.    DVT prophylaxis:  Medical DVT prophylaxis orders are present.    CODE STATUS:    Level Of Support Discussed With: Patient  Code Status (Patient has no pulse and is not breathing): No CPR (Do Not Attempt to Resuscitate)  Medical Interventions (Patient has pulse or is breathing): Full Support      Disposition:  I expect patient to be discharged .    This patient has been examined wearing appropriate Personal Protective Equipment and discussed with hospital infection control department. 06/11/22      Electronically signed by Michelle Castrejon MD, 06/11/22, 15:51 EDT.  [facility] Hospitalist Team

## 2022-06-11 NOTE — CASE MANAGEMENT/SOCIAL WORK
Continued Stay Note  [facility]     Patient Name: Devon Crane  MRN: [id number]  Today's Date: 6/11/2022    Admit Date: 6/10/2022     Discharge Plan     Row Name 06/11/22 1714       Plan    Plan PT/OT pending. Referral placed to [facility]; pending acceptance. No precert needed. PASRR per facility.    Patient/Family in Agreement with Plan yes    Plan Comments Patient diagnosised with COVID 5/27; out of 10 day window. From [facility]. Spoke with daughter Mecca via phone. Family does not want the patient to return to [facility]. They have been in touch with someone from [facility] who had been working on getting the patient transferred to them prior to this hospital admission. Referral was placed to [facility] and liaison notified. Acceptance pending. PT/OT ordered.              Phone communication or documentation only - no physical contact with patient or family.    Radha Rojo RN  Weekend   [address] [address]  Office: [phone]  Fax: [phone]  [email or web address]

## 2022-06-11 NOTE — DISCHARGE PLACEMENT REQUEST
"Devon Crane (89 y.o. Female)             Date of Birth   08/02/1932    Social Security Number       Address   [address] [place] [address] [place] [facility]    Home Phone   [phone]    MRN   [id number]       Scientologist   [employer]    Marital Status                               Admission Date   6/10/22    Admission Type   Emergency    Admitting Provider   Michelle Castrejon MD    Attending Provider   Michelle Castrejon MD    Department, Room/Bed   [facility] 2B MEDICAL INPATIENT, 232/1       Discharge Date       Discharge Disposition       Discharge Destination                               Attending Provider: Michelle Castrejon MD    Allergies: Bactrim [Sulfamethoxazole-trimethoprim], Diphenhydramine    Isolation: Contact, Enh Drop/Con   Infection: VRE No Isolation this Admit (05/14/22), COVID (confirmed) (06/10/22), Corinne Auris (rule out) (06/10/22)   Code Status: No CPR   Advance Care Planning Activity    Ht: 157.5 cm (62\")   Wt: 49.9 kg (110 lb)    Admission Cmt: None   Principal Problem: None                Active Insurance as of 6/10/2022     Primary Coverage     Payor Plan Insurance Group Employer/Plan Group    MEDICARE MEDICARE A & B      Payor Plan Address Payor Plan Phone Number Payor Plan Fax Number Effective Dates    [address] [phone]  8/1/1997 - None Entered    [facility] [id number]       Subscriber Name Subscriber Birth Date Member ID       DEVON CRANE 8/2/1932 [id number]           Secondary Coverage     Payor Plan Insurance Group Employer/Plan Group    [employer] SUP  [employer] SUP  NGN     Payor Plan Address Payor Plan Phone Number Payor Plan Fax Number Effective Dates    [address] [phone]  5/1/2019 - None Entered    [place] [address]       Subscriber Name Subscriber Birth Date Member ID       DEVON CRANE 8/2/1932 [id number]                 Emergency Contacts      (Rel.) Home Phone Work Phone Mobile Phone    FABIEN CRANE (Daughter) -- -- [phone]    " Jose Maria Crane (Son) -- -- [phone]               History & Physical      Michelle Castrejon MD at 06/10/22 2026              [facility] Medicine Services      Patient Name: Devon Crane  : 1932  MRN: [id number]  Primary Care Physician:  Yelitza Mcdaniel MD  Date of admission: 6/10/2022      Subjective      Chief Complaint: Feeling poorly    History of Present Illness: Devon Crane is a 89 y.o. female who presented to [facility] on 6/10/2022 complaining of not feeling well.  The patient has had 3 urinary tract infections now in the past month and a half.  She has been hospitalized now for all 3.  She did have a E. coli that was cultured out that was pansensitive.  The patient has not had any fever or chills she just has had a little bit of feeling unwell and decreased appetite and p.o. intake.  She also has not been as active as she normally is.  She has been in the hospital twice now and was at [facility] and rehab in order to get strong enough to go back to her own home which is next-door to the daughter who was present at the time of my interview.  The patient tells me that she is feeling okay now and not having problems.      Review of Systems   Constitutional: Positive for decreased appetite and malaise/fatigue.   HENT: Negative.    Eyes: Negative.    Cardiovascular: Negative.    Respiratory: Negative.    Endocrine: Negative.    Hematologic/Lymphatic: Negative.    Skin: Negative.    Musculoskeletal: Positive for myalgias.   Gastrointestinal: Negative.    Genitourinary: Negative.    Neurological: Negative.    Psychiatric/Behavioral: Negative.    Allergic/Immunologic: Negative.    All other systems reviewed and are negative.       Personal History     Past Medical History:   Diagnosis Date   • Abnormal ECG    • YUDELKA (acute kidney injury) ([facility]) 2022   • Anemia    • Asthma    • CAD (coronary artery disease)    • Congenital heart disease    • COPD (chronic  obstructive pulmonary disease) (HCC)    • Deep vein thrombosis (HCC)    • Dyslipidemia    • GERD (gastroesophageal reflux disease)    • Hearing loss    • Hyperlipidemia    • Hypertension    • MDS (myelodysplastic syndrome) (HCC)    • Osteoarthritis    • Oxygen dependent    • Paroxysmal A-fib (HCC)    • Presence of pessary    • Prolapse of female bladder, acquired    • Pulmonary hypertension (HCC)    • Pulmonary hypertension (HCC)    • Syncope    • Vaginal bleeding        Past Surgical History:   Procedure Laterality Date   • APPENDECTOMY  05/1947   • BONE MARROW BIOPSY  07/10/2019   • CARDIAC CATHETERIZATION  02/19/2003, 06/22/2004, 01/16/2007, 12/2010, 10/06/2014,05/27/2018   • CARDIAC VALVE REPLACEMENT     • CHOLECYSTECTOMY  02/06/2007   • CORONARY ANGIOPLASTY     • CORONARY ARTERY BYPASS GRAFT  2013   • CYSTOCELE REPAIR  05/1990   • CYSTOSCOPY BLADDER BIOPSY N/A 5/18/2022    Procedure: CYSTOSCOPY BLADDER BIOPSY WITH FULGURATION, with pylogram;  Surgeon: Gera Bush MD;  Location: [facility] OR;  Service: Urology;  Laterality: N/A;   • EYE LENS IMPLANT SECONDARY  04/2000, 05/2000   • HYSTERECTOMY  04/1963   • KNEE CARTILAGE SURGERY Right 01/10/2001   • SKIN CANCER EXCISION      head and face       Family History: family history includes Cancer in her brother and sister; Diabetes in her brother; Heart disease in her brother, brother, father, mother, and sister; Stroke in her mother. Otherwise pertinent FHx was reviewed and not pertinent to current issue.    Social History:  reports that she has never smoked. She has never used smokeless tobacco. She reports that she does not drink alcohol and does not use drugs.    Home Medications:  Prior to Admission Medications     Prescriptions Last Dose Informant Patient Reported? Taking?    Folic Acid-Vit B6-Vit B12 (B COMPLEX-FOLIC ACID PO) 6/10/2022  Yes Yes    Take 1 tablet by mouth Daily.    ipratropium-albuterol (DUO-NEB) 0.5-2.5 mg/3 ml nebulizer   Yes Yes    Take 3  mL by nebulization Every 4 (Four) Hours As Needed for Shortness of Air.    levothyroxine (SYNTHROID, LEVOTHROID) 50 MCG tablet 6/10/2022  Yes Yes    Take 50 mcg by mouth Every Morning.    linezolid (ZYVOX) 600 MG tablet 6/10/2022  Yes Yes    Take 600 mg by mouth 2 (Two) Times a Day.    Multiple Vitamins-Minerals (MULTIVITAMIN ADULT PO) 6/10/2022  Yes Yes    Take 1 tablet by mouth Daily.    nitroglycerin (NITROSTAT) 0.4 MG SL tablet   No Yes    Place 1 tablet under the tongue Every 5 (Five) Minutes As Needed for Chest Pain. Take no more than 3 doses in 15 minutes.    omeprazole (priLOSEC) 20 MG capsule 6/10/2022  Yes Yes    Take 20 mg by mouth Daily.    simvastatin (ZOCOR) 40 MG tablet 6/9/2022  Yes Yes    Take 40 mg by mouth Every Night.    Xarelto 15 MG tablet 6/10/2022  No Yes    TAKE 1 TABLET BY MOUTH DAILY WITH DINNER            Allergies:  Allergies   Allergen Reactions   • Bactrim [Sulfamethoxazole-Trimethoprim] Other (See Comments)     Acute renal failure   • Diphenhydramine Swelling     Unknown.        Objective      Vitals:   Temp:  [97.9 °F (36.6 °C)] 97.9 °F (36.6 °C)  Heart Rate:  [66-75] 75  Resp:  [16] 16  BP: (114-128)/(65-74) 127/65  Flow (L/min):  [2] 2    Physical Exam  Vitals and nursing note reviewed.   Constitutional:       General: She is not in acute distress.     Appearance: She is well-developed. She is not ill-appearing, toxic-appearing or diaphoretic.      Comments: The patient is slightly pale and very thin in her appearance.  She is quite sharp mentally and can answer questions quickly.  She does not have a hearing impediment either.   HENT:      Head: Normocephalic and atraumatic.      Right Ear: Ear canal and external ear normal.      Left Ear: Ear canal and external ear normal.      Nose: Nose normal. No congestion or rhinorrhea.      Mouth/Throat:      Mouth: Mucous membranes are moist.      Pharynx: No oropharyngeal exudate.   Eyes:      General: No scleral icterus.        Right  eye: No discharge.         Left eye: No discharge.      Extraocular Movements: Extraocular movements intact.      Conjunctiva/sclera: Conjunctivae normal.      Pupils: Pupils are equal, round, and reactive to light.   Neck:      Thyroid: No thyromegaly.      Vascular: No carotid bruit or JVD.      Trachea: No tracheal deviation.   Cardiovascular:      Rate and Rhythm: Normal rate and regular rhythm.      Pulses: Normal pulses.      Heart sounds: Normal heart sounds. No murmur heard.    No friction rub. No gallop.   Pulmonary:      Effort: Pulmonary effort is normal. No respiratory distress.      Breath sounds: No stridor. No wheezing, rhonchi or rales.      Comments: Decreased breath sounds in the bases  Chest:      Chest wall: No tenderness.   Abdominal:      General: Bowel sounds are normal. There is no distension.      Palpations: Abdomen is soft. There is no mass.      Tenderness: There is no abdominal tenderness. There is no guarding or rebound.      Hernia: No hernia is present.   Musculoskeletal:         General: No swelling, tenderness, deformity or signs of injury. Normal range of motion.      Cervical back: Normal range of motion and neck supple. No rigidity. No muscular tenderness.      Right lower leg: No edema.      Left lower leg: No edema.   Lymphadenopathy:      Cervical: No cervical adenopathy.   Skin:     General: Skin is warm and dry.      Coloration: Skin is not jaundiced or pale.      Findings: No bruising, erythema or rash.   Neurological:      General: No focal deficit present.      Mental Status: She is alert and oriented to person, place, and time. Mental status is at baseline.      Cranial Nerves: No cranial nerve deficit.      Sensory: No sensory deficit.      Motor: No weakness or abnormal muscle tone.      Coordination: Coordination normal.   Psychiatric:         Mood and Affect: Mood normal.         Behavior: Behavior normal.         Thought Content: Thought content normal.          "Judgment: Judgment normal.          Result Review    Result Review:  I have personally reviewed the results from the time of this admission to 6/10/2022 20:26 EDT and agree with these findings:  [x]  Laboratory  [x]  Microbiology  [x]  Radiology  []  EKG/Telemetry   []  Cardiology/Vascular   []  Pathology  []  Old records  []  Other:  Most notable findings include:     Assessment & Plan        Active Hospital Problems:  Active Hospital Problems    Diagnosis    • Acute hyponatremia      Plan:   1.  Asymptomatic COVID-19  -Patient is normally on 2 L/min per nasal cannula of oxygen and still is.  She has not had any desaturations.  -The patient has been positive for approximately 10 to 11 days according to the family.  She is out of the window for treatment for COVID and she is asymptomatic without any issues related to this  -We will isolate as per hospital protocol    2.  Urinary tract infection  -Patient most recently had a urinary tract infection with E. coli.  It was pansensitive.  Rocephin has been started and we will continue it until culture data dictates otherwise.  -Cautious hydration given that the patient's sodium is 124.    3.  Hyponatremia  -Is not the patient's first bout of hyponatremia.  The last 2 times that she was hospitalized her sodium was below 125.  The patient's urinary sodium was 26.  We will cautiously give normal saline rechecking her basic metabolic profile in the a.m.  -Consider nephrology consult if this continues to be a problem    4.  New diagnosis of \"aggressive\" bladder cancer  -The patient's daughter explained to me that they are considering doing aggressive radiation therapy if her mother is strong enough for it.  They have not started treatment and have meeting set up in the near future to better deal with this.    In the presence of the patient's daughter I did ask her mother who was completely lucid and competent whether or not she wanted CPR, cardioversion, chemical code or " mechanical ventilation.  Her mother is adamant that she wants none of it and her CODE STATUS order has been placed to reflect her wishes.    DVT prophylaxis:  No DVT prophylaxis order currently exists.    CODE STATUS:    Level Of Support Discussed With: Patient  Code Status (Patient has no pulse and is not breathing): No CPR (Do Not Attempt to Resuscitate)  Medical Interventions (Patient has pulse or is breathing): Full Support    Admission Status:  I believe this patient meets  status.    I discussed the patient's findings and my recommendations with patient who agrees to proceed as outlined above.    This patient has been examined wearing appropriate Personal Protective Equipment and discussed with hospital infection control department. 06/10/22      Signature: Electronically signed by Michelle Castrejon MD, 06/10/22, 8:27 PM EDT.      Electronically signed by Michelle Castrejon MD at 06/10/22 2035          Physician Progress Notes (last 24 hours)      Michelle Castrejon MD at 22 1551              [facility] Medicine Services Daily Progress Note    Patient Name: Devon Crane  : 1932  MRN: [id number]  Primary Care Physician:  Yelitza Mcdaniel MD  Date of admission: 6/10/2022      Subjective      Chief Complaint: [place] unwell with hyponatremia.    Review of Systems   Constitutional: Positive for decreased appetite.   HENT: Negative.    Eyes: Negative.    Cardiovascular: Negative.    Respiratory: Negative.    Endocrine: Negative.    Hematologic/Lymphatic: Negative.    Skin: Negative.    Musculoskeletal: Negative.    Gastrointestinal: Negative.    Genitourinary: Positive for frequency.   Neurological: Positive for weakness.   Psychiatric/Behavioral: Negative.    Allergic/Immunologic: Negative.    All other systems reviewed and are negative.         Objective      Vitals:   Temp:  [97.5 °F (36.4 °C)-98 °F (36.7 °C)] 97.5 °F (36.4 °C)  Heart Rate:  [66-77] 77  Resp:  [16] 16  BP:  (107-131)/(59-71) 126/64  Flow (L/min):  [1.5-2] 1.5    Physical Exam  Vitals and nursing note reviewed.   Constitutional:       General: She is not in acute distress.     Appearance: Normal appearance. She is well-developed. She is not ill-appearing, toxic-appearing or diaphoretic.      Comments: The patient is very appropriate.  She is very slender and a bit on the pale side.  She is conversational and appropriate.   HENT:      Head: Normocephalic and atraumatic.      Right Ear: Ear canal and external ear normal.      Left Ear: Ear canal and external ear normal.      Nose: Nose normal. No congestion or rhinorrhea.      Mouth/Throat:      Mouth: Mucous membranes are moist.      Pharynx: No oropharyngeal exudate.   Eyes:      General: No scleral icterus.        Right eye: No discharge.         Left eye: No discharge.      Extraocular Movements: Extraocular movements intact.      Conjunctiva/sclera: Conjunctivae normal.      Pupils: Pupils are equal, round, and reactive to light.   Neck:      Thyroid: No thyromegaly.      Vascular: No carotid bruit or JVD.      Trachea: No tracheal deviation.   Cardiovascular:      Rate and Rhythm: Normal rate and regular rhythm.      Pulses: Normal pulses.      Heart sounds: Normal heart sounds. No murmur heard.    No friction rub. No gallop.   Pulmonary:      Effort: Pulmonary effort is normal. No respiratory distress.      Breath sounds: Normal breath sounds. No stridor. No wheezing, rhonchi or rales.   Chest:      Chest wall: No tenderness.   Abdominal:      General: Bowel sounds are normal. There is no distension.      Palpations: Abdomen is soft. There is no mass.      Tenderness: There is no abdominal tenderness. There is no guarding or rebound.      Hernia: No hernia is present.   Musculoskeletal:         General: No swelling, tenderness, deformity or signs of injury. Normal range of motion.      Cervical back: Normal range of motion and neck supple. No rigidity. No muscular  tenderness.      Right lower leg: No edema.      Left lower leg: No edema.   Lymphadenopathy:      Cervical: No cervical adenopathy.   Skin:     General: Skin is warm and dry.      Coloration: Skin is not jaundiced or pale.      Findings: No bruising, erythema or rash.   Neurological:      General: No focal deficit present.      Mental Status: She is alert and oriented to person, place, and time. Mental status is at baseline.      Cranial Nerves: No cranial nerve deficit.      Sensory: No sensory deficit.      Motor: No weakness or abnormal muscle tone.      Coordination: Coordination normal.   Psychiatric:         Mood and Affect: Mood normal.         Behavior: Behavior normal.         Thought Content: Thought content normal.         Judgment: Judgment normal.             Result Review    Result Review:  I have personally reviewed the results from the time of this admission to 6/11/2022 15:51 EDT and agree with these findings:  [x]  Laboratory  [x]  Microbiology  [x]  Radiology  []  EKG/Telemetry   []  Cardiology/Vascular   []  Pathology  []  Old records  []  Other:  Most notable findings include:     Wounds (last 24 hours)     LDA Wound     Row Name 06/11/22 0025 06/11/22 0018 06/10/22 2153       Wound 06/10/22 2210 medial coccyx Pressure Injury    Wound - Properties Group Placement Date: 06/10/22  -DW Placement Time: 2210  -DW Present on Hospital Admission: Y  -DW Orientation: medial  -DW Location: coccyx  -DW Primary Wound Type: Pressure inj  -DW    Wound Image View All Images -- View Images  -MG --    Pressure Injury Stage -- -- 1  -DW    Dressing Appearance -- -- open to air;no drainage  -DW    Closure -- -- Open to air  -DW    Base -- -- dry;clean;red  -DW    Periwound -- -- dry  -DW    Periwound Temperature -- -- warm  -DW    Periwound Skin Turgor -- -- soft  -DW    Edges -- -- irregular  -DW    Dressing Care dressing changed  -MG -- dressing applied  meplex applied  -DW    Retired Wound - Properties Group  Placement Date: 06/10/22  -DW Placement Time: 2210 -DW Present on Hospital Admission: Y  -DW Orientation: medial  -DW Location: coccyx  -DW Primary Wound Type: Pressure inj  -DW    Retired Wound - Properties Group Date first assessed: 06/10/22  -DW Time first assessed: 2210 -DW Present on Hospital Admission: Y  -DW Location: coccyx  -DW Primary Wound Type: Pressure inj  -DW          User Key  (r) = Recorded By, (t) = Taken By, (c) = Cosigned By    Initials Name Provider Type    Joann Stephens LPN Licensed Nurse    Lizette Burrell RN Registered Nurse                  Assessment & Plan      Brief Patient Summary:  Devon Crane is a 89 y.o. female who was in an inpatient rehab center who developed increasing problems with not feeling well.  The patient has recently been diagnosed with an aggressive form of bladder cancer and has had 3 hospitalizations for urinary tract infection she does not seem to be able to shake.  The patient had hyponatremia and was transferred from the rehab center to [facility] for ongoing treatment.  The patient is feeling better today but remains sort of quiet.  Her daughter is at the bedside and was updated.      atorvastatin, 10 mg, Oral, Daily  cefTRIAXone, 1 g, Intravenous, Once  lansoprazole, 30 mg, Nasogastric, QAM  levothyroxine, 50 mcg, Oral, Daily  linezolid, 600 mg, Oral, Q12H  multivitamin with minerals, 1 tablet, Oral, Daily  rivaroxaban, 15 mg, Oral, Daily With Dinner  sodium chloride, 10 mL, Intravenous, Q12H       sodium chloride, 50 mL/hr, Last Rate: 50 mL/hr (06/11/22 1523)         Active Hospital Problems:  Active Hospital Problems    Diagnosis    • Acute hyponatremia      Plan:   1.  Asymptomatic COVID-19  -Patient is normally on 2 L/min per nasal cannula of oxygen and still is.  She has not had any desaturations.  -The patient has been positive for approximately 10 to 11 days according to the family.  She is out of the window for treatment for COVID and she  "is asymptomatic without any issues related to this  -We will isolate as per hospital protocol     2.  Urinary tract infection  -Patient most recently had a urinary tract infection with E. coli.  It was pansensitive.  Rocephin has been started and we will continue it until culture data dictates otherwise.  No positive cultures today.  -Cautious hydration given that the patient's sodium is 124.     3.  Hyponatremia  -Is not the patient's first bout of hyponatremia.  The last 2 times that she was hospitalized her sodium was below 125.  The patient's urinary sodium was 26.  We will cautiously give normal saline rechecking her basic metabolic profile in the a.m.  -Consider nephrology consult if this continues to be a problem       -patient serum sodium is up to 131.  We will decrease her IV fluids and recheck in the a.m.    4.  New diagnosis of \"aggressive\" bladder cancer  -The patient's daughter explained to me that they are considering doing aggressive radiation therapy if her mother is strong enough for it.  They have not started treatment and have meeting set up in the near future to better deal with this.     In the presence of the patient's daughter I did ask her mother who was completely lucid and competent whether or not she wanted CPR, cardioversion, chemical code or mechanical ventilation.  Her mother is adamant that she wants none of it and her CODE STATUS order has been placed to reflect her wishes.    DVT prophylaxis:  Medical DVT prophylaxis orders are present.    CODE STATUS:    Level Of Support Discussed With: Patient  Code Status (Patient has no pulse and is not breathing): No CPR (Do Not Attempt to Resuscitate)  Medical Interventions (Patient has pulse or is breathing): Full Support      Disposition:  I expect patient to be discharged .    This patient has been examined wearing appropriate Personal Protective Equipment and discussed with hospital infection control department. " 06/11/22      Electronically signed by Michelle Castrejon MD, 06/11/22, 15:51 EDT.  [facility]             Electronically signed by Michelle Castrejon MD at 06/11/22 1279

## 2022-06-12 NOTE — THERAPY EVALUATION
Patient Name: Devon Crane  : 1932    MRN: [id number]                              Today's Date: 2022       Admit Date: 6/10/2022    Visit Dx:     ICD-10-CM ICD-9-CM   1. Acute hyponatremia  E87.1 276.1   2. Acute cystitis with hematuria  N30.01 595.0     Patient Active Problem List   Diagnosis   • Macrocytosis   • MDS (myelodysplastic syndrome) ([facility])   • Coronary artery disease involving autologous vein coronary bypass graft without angina pectoris   • Centrilobular emphysema ([facility])   • Mixed hyperlipidemia   • Osteoporosis, post-menopausal   • Gastroesophageal reflux disease without esophagitis   • Paroxysmal atrial fibrillation ([facility])   • Primary osteoarthritis involving multiple joints   • Pulmonary hypertension ([facility])   • Sensorineural hearing loss (SNHL) of both ears   • Underweight   • Episcleritis   • Essential hypertension   • YUDELKA (acute kidney injury) ([facility])   • General weakness   • Falls, initial encounter   • Traumatic rhabdomyolysis ([facility])   • Acute UTI (urinary tract infection)   • Hypothyroidism (acquired)   • Severe malnutrition ([facility])   • Acute hyponatremia     Past Medical History:   Diagnosis Date   • Abnormal ECG    • YUDELKA (acute kidney injury) ([facility]) 2022   • Anemia    • Asthma 2019   • CAD (coronary artery disease)    • Congenital heart disease    • COPD (chronic obstructive pulmonary disease) ([facility])    • Deep vein thrombosis ([facility])    • Dyslipidemia    • GERD (gastroesophageal reflux disease)    • Hearing loss    • Hyperlipidemia    • Hypertension    • MDS (myelodysplastic syndrome) ([facility])    • Osteoarthritis    • Oxygen dependent    • Paroxysmal A-fib ([facility])    • Presence of pessary    • Prolapse of female bladder, acquired    • Pulmonary hypertension ([facility])    • Pulmonary hypertension ([facility])    • Syncope    • Vaginal bleeding      Past Surgical History:   Procedure Laterality Date   • APPENDECTOMY  1947   • BONE MARROW BIOPSY  07/10/2019   • CARDIAC CATHETERIZATION  2003,  06/22/2004, 01/16/2007, 12/2010, 10/06/2014,05/27/2018   • CARDIAC VALVE REPLACEMENT     • CHOLECYSTECTOMY  02/06/2007   • CORONARY ANGIOPLASTY     • CORONARY ARTERY BYPASS GRAFT  2013   • CYSTOCELE REPAIR  05/1990   • CYSTOSCOPY BLADDER BIOPSY N/A 5/18/2022    Procedure: CYSTOSCOPY BLADDER BIOPSY WITH FULGURATION, with pylogram;  Surgeon: Gera Bush MD;  Location: [facility] MAIN OR;  Service: Urology;  Laterality: N/A;   • EYE LENS IMPLANT SECONDARY  04/2000, 05/2000   • HYSTERECTOMY  04/1963   • KNEE CARTILAGE SURGERY Right 01/10/2001   • SKIN CANCER EXCISION      head and face      General Information     Row Name 06/12/22 1715          General Information    Patient Profile Reviewed yes  -BL     Prior Level of Function independent:;ADL's;all household mobility  uses a RW  -     Existing Precautions/Restrictions fall;oxygen therapy device and L/min  -     Barriers to Rehab medically complex;previous functional deficit  -     Row Name 06/12/22 1715          Living Environment    People in Home alone  -     Row Name 06/12/22 1715          Cognition    Orientation Status (Cognition) oriented x 4  -BL     Row Name 06/12/22 1715          Safety Issues, Functional Mobility    Safety Issues Affecting Function (Mobility) safety precaution awareness;insight into deficits/self-awareness  -     Impairments Affecting Function (Mobility) balance;endurance/activity tolerance;strength  -           User Key  (r) = Recorded By, (t) = Taken By, (c) = Cosigned By    Initials Name Provider Type     Mikayla Moctezuma OT Occupational Therapist                 Mobility/ADL's     Row Name 06/12/22 1716          Bed Mobility    Comment, (Bed Mobility) NT - pt in chair on arrival  -     Row Name 06/12/22 1716          Transfers    Sit-Stand [place] (Transfers) minimum assist (75% patient effort)  -     Row Name 06/12/22 1716          Activities of Daily Living    BADL Assessment/Intervention lower body  dressing;toileting  -BL     Row Name 06/12/22 1716          Lower Body Dressing Assessment/Training    [place] Level (Lower Body Dressing) don;doff;pants/bottoms;maximum assist (25% patient effort)  -BL     Position (Lower Body Dressing) supported standing;supported sitting  -BL     Row Name 06/12/22 1716          Toileting Assessment/Training    [place] Level (Toileting) toileting skills;minimum assist (75% patient effort)  -BL     Position (Toileting) supported sitting;supported standing  -BL     Comment, (Toileting) Pt incontienent of bladder  -BL           User Key  (r) = Recorded By, (t) = Taken By, (c) = Cosigned By    Initials Name Provider Type    BL Mikayla Moctezuma OT Occupational Therapist               Obj/Interventions     Row Name 06/12/22 1717          Sensory Assessment (Somatosensory)    Sensory Assessment (Somatosensory) sensation intact  -[facility] [place] Name 06/12/22 1717          Vision Assessment/Intervention    Visual Impairment/Limitations corrective lenses full-time  -     Row Name 06/12/22 1717          Range of Motion Comprehensive    General Range of Motion bilateral upper extremity ROM WFL  -[facility] [place] Name 06/12/22 1717          Strength Comprehensive (MMT)    Comment, General Manual Muscle Testing (MMT) Assessment BUE grossly 3/5  -BL     Row Name 06/12/22 1717          Balance    Balance Assessment sitting static balance;sitting dynamic balance;standing static balance;standing dynamic balance  -BL     Static Sitting Balance supervision  -BL     Dynamic Sitting Balance contact guard  -BL     Static Standing Balance minimal assist  -BL     Dynamic Standing Balance minimal assist  -BL     Position/Device Used, Standing Balance walker, rolling  -BL           User Key  (r) = Recorded By, (t) = Taken By, (c) = Cosigned By    Initials Name Provider Type    Mikayla Duarte OT Occupational Therapist               Goals/Plan     Row Name 06/12/22 1719          Transfer Goal 1 (OT)     Activity/Assistive Device (Transfer Goal 1, OT) sit-to-stand/stand-to-sit;toilet  -BL     [place] Level/Cues Needed (Transfer Goal 1, OT) contact guard required  -BL     Time Frame (Transfer Goal 1, OT) 2 weeks  -BL     Row Name 06/12/22 1719          Dressing Goal 1 (OT)    Activity/Device (Dressing Goal 1, OT) dressing skills, all  -BL     Crane/Cues Needed (Dressing Goal 1, OT) minimum assist (75% or more patient effort)  -BL     Time Frame (Dressing Goal 1, OT) 2 weeks  -BL     Row Name 06/12/22 1719          Toileting Goal 1 (OT)    Activity/Device (Toileting Goal 1, OT) toileting skills, all  -BL     [place] Level/Cues Needed (Toileting Goal 1, OT) contact guard required  -BL     Time Frame (Toileting Goal 1, OT) 2 weeks  -BL     Row Name 06/12/22 1719          Therapy Assessment/Plan (OT)    Planned Therapy Interventions (OT) activity tolerance training;BADL retraining;cognitive/visual perception retraining;functional balance retraining;neuromuscular control/coordination retraining;occupation/activity based interventions;passive ROM/stretching;patient/caregiver education/training;transfer/mobility retraining;strengthening exercise;ROM/therapeutic exercise  -BL           User Key  (r) = Recorded By, (t) = Taken By, (c) = Cosigned By    Initials Name Provider Type    BL Mikayla Moctezuma OT Occupational Therapist               Clinical Impression     Row Name 06/12/22 1717          Pain Assessment    Pretreatment Pain Rating 0/10 - no pain  -BL     Posttreatment Pain Rating 0/10 - no pain  -BL     Row Name 06/12/22 1717          Plan of Care Review    Plan of Care Reviewed With patient  -BL     Outcome Evaluation 88 yo female admitted with hyponatremia.  Pt was COVID (+) on 5/27 and is from rehab.  Pt with recent d/o aggressive bladder CA. Pt to rehab pt was living alone. At time of evaluation, pt A&0X4. Pt required min A to stand, max A for LB dressing, and min A for toileting. Pt below  baseline and would benefit from ADRIENNE.  -     Row Name 06/12/22 1717          Therapy Assessment/Plan (OT)    Therapy Frequency (OT) 3 times/wk  -     Row Name 06/12/22 1717          Therapy Plan Review/Discharge Plan (OT)    Anticipated Discharge Disposition (OT) [facility] nursing facility  -     Row Name 06/12/22 1717          Vital Signs    O2 Delivery Pre Treatment supplemental O2  -BL     O2 Delivery Intra Treatment supplemental O2  -BL     O2 Delivery Post Treatment supplemental O2  -BL     Row Name 06/12/22 1717          Positioning and Restraints    Pre-Treatment Position sitting in chair/recliner  -BL     Post Treatment Position chair  -BL     In Chair notified nsg;call light within reach;encouraged to call for assist;exit alarm on  -BL           User Key  (r) = Recorded By, (t) = Taken By, (c) = Cosigned By    Initials Name Provider Type    Mikayla Duarte OT Occupational Therapist               Outcome Measures     Row Name 06/12/22 1232          How much help from another person do you currently need...    Turning from your back to your side while in flat bed without using bedrails? 3  -JH     Moving from lying on back to sitting on the side of a flat bed without bedrails? 3  -JH     Moving to and from a bed to a chair (including a wheelchair)? 3  -JH     Standing up from a chair using your arms (e.g., wheelchair, bedside chair)? 3  -JH     Climbing 3-5 steps with a railing? 2  -JH     To walk in hospital room? 3  -JH     AM-PAC 6 Clicks Score (PT) 17  -JH     Highest level of mobility 5 --> Static standing  -     Row Name 06/12/22 1232          Functional Assessment    Outcome Measure Options AM-PAC 6 Clicks Basic Mobility (PT)  -           User Key  (r) = Recorded By, (t) = Taken By, (c) = Cosigned By    Initials Name Provider Type    Mel James PT Physical Therapist                Occupational Therapy Education                 Title: PT OT SLP Therapies (Done)     Topic: Occupational  Therapy (Done)     Point: ADL training (Done)     Description:   Instruct learner(s) on proper safety adaptation and remediation techniques during self care or transfers.   Instruct in proper use of assistive devices.              Learning Progress Summary           Patient Acceptance, E,TB, VU,NR by BL at 6/12/2022 1720                   Point: Home exercise program (Done)     Description:   Instruct learner(s) on appropriate technique for monitoring, assisting and/or progressing therapeutic exercises/activities.              Learning Progress Summary           Patient Acceptance, E,TB, VU,NR by BL at 6/12/2022 1720                   Point: Precautions (Done)     Description:   Instruct learner(s) on prescribed precautions during self-care and functional transfers.              Learning Progress Summary           Patient Acceptance, E,TB, VU,NR by BL at 6/12/2022 1720                   Point: Body mechanics (Done)     Description:   Instruct learner(s) on proper positioning and spine alignment during self-care, functional mobility activities and/or exercises.              Learning Progress Summary           Patient Acceptance, E,TB, VU,NR by BL at 6/12/2022 1720                               User Key     Initials Effective Dates Name Provider Type Discipline     04/13/21 -  Mikayla Moctezuma OT Occupational Therapist OT              OT Recommendation and Plan  Planned Therapy Interventions (OT): activity tolerance training, BADL retraining, cognitive/visual perception retraining, functional balance retraining, neuromuscular control/coordination retraining, occupation/activity based interventions, passive ROM/stretching, patient/caregiver education/training, transfer/mobility retraining, strengthening exercise, ROM/therapeutic exercise  Therapy Frequency (OT): 3 times/wk  Plan of Care Review  Plan of Care Reviewed With: patient  Outcome Evaluation: 88 yo female admitted with hyponatremia.  Pt was COVID (+) on 5/27  and is from rehab.  Pt with recent d/o aggressive bladder CA. Pt to rehab pt was living alone. At time of evaluation, pt A&0X4. Pt required min A to stand, max A for LB dressing, and min A for toileting. Pt below baseline and would benefit from ADRIENNE.     Time Calculation:    Time Calculation- OT     Row Name 06/12/22 1720             Time Calculation- OT    OT Start Time 1004  -BL      OT Stop Time 1030  -BL      OT Time Calculation (min) 26 min  -BL      OT Received On 06/12/22  -      OT - Next Appointment 06/14/22  -      OT Goal Re-Cert Due Date 06/26/22  -BL            User Key  (r) = Recorded By, (t) = Taken By, (c) = Cosigned By    Initials Name Provider Type     Shital Dale OT Occupational Therapist              Therapy Charges for Today     Code Description Service Date Service Provider Modifiers Qty    [id number] HC OT EVAL MOD COMPLEXITY 4 6/12/2022 Shital Dale OT GO 1               SHITAL DALE OT  6/12/2022

## 2022-06-12 NOTE — PLAN OF CARE
Goal Outcome Evaluation:  Plan of Care Reviewed With: patient           Outcome Evaluation: Patient was up in chair for most of the shift, no complaints of pain or nausea, waiting for acceptance of [facility]. will continue to monitor

## 2022-06-12 NOTE — PLAN OF CARE
Goal Outcome Evaluation:  Plan of Care Reviewed With: patient           Outcome Evaluation: Pt slept well throughout the night, no c/o pain or discomfort. IVF currently infusing, waiting on placement at this time. Will continue to monitor.

## 2022-06-12 NOTE — THERAPY EVALUATION
Patient Name: Devon Crane  : 1932    MRN: [id number]                              Today's Date: 2022       Admit Date: 6/10/2022    Visit Dx:     ICD-10-CM ICD-9-CM   1. Acute hyponatremia  E87.1 276.1   2. Acute cystitis with hematuria  N30.01 595.0     Patient Active Problem List   Diagnosis   • Macrocytosis   • MDS (myelodysplastic syndrome) ([facility])   • Coronary artery disease involving autologous vein coronary bypass graft without angina pectoris   • Centrilobular emphysema ([facility])   • Mixed hyperlipidemia   • Osteoporosis, post-menopausal   • Gastroesophageal reflux disease without esophagitis   • Paroxysmal atrial fibrillation ([facility])   • Primary osteoarthritis involving multiple joints   • Pulmonary hypertension ([facility])   • Sensorineural hearing loss (SNHL) of both ears   • Underweight   • Episcleritis   • Essential hypertension   • YUDELKA (acute kidney injury) ([facility])   • General weakness   • Falls, initial encounter   • Traumatic rhabdomyolysis ([facility])   • Acute UTI (urinary tract infection)   • Hypothyroidism (acquired)   • Severe malnutrition ([facility])   • Acute hyponatremia     Past Medical History:   Diagnosis Date   • Abnormal ECG    • YUDELKA (acute kidney injury) ([facility]) 2022   • Anemia    • Asthma 2019   • CAD (coronary artery disease)    • Congenital heart disease    • COPD (chronic obstructive pulmonary disease) ([facility])    • Deep vein thrombosis ([facility])    • Dyslipidemia    • GERD (gastroesophageal reflux disease)    • Hearing loss    • Hyperlipidemia    • Hypertension    • MDS (myelodysplastic syndrome) ([facility])    • Osteoarthritis    • Oxygen dependent    • Paroxysmal A-fib ([facility])    • Presence of pessary    • Prolapse of female bladder, acquired    • Pulmonary hypertension ([facility])    • Pulmonary hypertension ([facility])    • Syncope    • Vaginal bleeding      Past Surgical History:   Procedure Laterality Date   • APPENDECTOMY  1947   • BONE MARROW BIOPSY  07/10/2019   • CARDIAC CATHETERIZATION  2003,  06/22/2004, 01/16/2007, 12/2010, 10/06/2014,05/27/2018   • CARDIAC VALVE REPLACEMENT     • CHOLECYSTECTOMY  02/06/2007   • CORONARY ANGIOPLASTY     • CORONARY ARTERY BYPASS GRAFT  2013   • CYSTOCELE REPAIR  05/1990   • CYSTOSCOPY BLADDER BIOPSY N/A 5/18/2022    Procedure: CYSTOSCOPY BLADDER BIOPSY WITH FULGURATION, with pylogram;  Surgeon: Gera Bush MD;  Location: [facility] MAIN OR;  Service: Urology;  Laterality: N/A;   • EYE LENS IMPLANT SECONDARY  04/2000, 05/2000   • HYSTERECTOMY  04/1963   • KNEE CARTILAGE SURGERY Right 01/10/2001   • SKIN CANCER EXCISION      head and face      General Information     [facility] Name 06/12/22 1224          Physical Therapy Time and Intention    Document Type evaluation  -     Mode of Treatment physical therapy  -[facility] Name 06/12/22 1224          General Information    Patient Profile Reviewed yes  -     Prior Level of Function independent:  using RW, has recently been in rehab  -     Existing Precautions/Restrictions fall;oxygen therapy device and L/min  -     Barriers to Rehab medically complex;previous functional deficit  -[facility] Name 06/12/22 1224          Living Environment    People in Home alone  recently in rehab  -[facility] Name 06/12/22 1224          Cognition    Orientation Status (Cognition) oriented x 3  -[facility] Name 06/12/22 1224          Safety Issues, Functional Mobility    Impairments Affecting Function (Mobility) balance;endurance/activity tolerance;strength  -           User Key  (r) = Recorded By, (t) = Taken By, (c) = Cosigned By    Initials Name Provider Type     Mel Awad PT Physical Therapist               Mobility     Row Name 06/12/22 1225          Bed Mobility    Comment, (Bed Mobility) up in chair on arrival  -[facility] Name 06/12/22 1225          Sit-Stand Transfer    Sit-Stand [place] (Transfers) minimum assist (75% patient effort)  -     Comment, (Sit-Stand Transfer) from chair and commode  -[facility] Name  06/12/22 1225          Gait/Stairs (Locomotion)    [place] Level (Gait) minimum assist (75% patient effort);1 person assist;1 person to manage equipment  -     Assistive Device (Gait) walker, front-wheeled  -     Distance in Feet (Gait) 15' x 2  -     Deviations/Abnormal Patterns (Gait) base of support, narrow;gait speed decreased;stride length decreased  -     Bilateral Gait Deviations forward flexed posture  -     Comment, (Gait/Stairs) needs asssit to maneuver walker when turning  -           User Key  (r) = Recorded By, (t) = Taken By, (c) = Cosigned By    Initials Name Provider Type     Mel Awad, ROSINA Physical Therapist               Obj/Interventions     Row Name 06/12/22 1228          Range of Motion Comprehensive    General Range of Motion bilateral lower extremity ROM WFL  -[facility] Name 06/12/22 1228          Strength Comprehensive (MMT)    Comment, General Manual Muscle Testing (MMT) Assessment BLEs 4-/5  -[facility] Name 06/12/22 1228          Balance    Balance Assessment standing static balance;standing dynamic balance  -     Static Standing Balance minimal assist  -     Dynamic Standing Balance minimal assist  -     Position/Device Used, Standing Balance supported;walker, rolling  -     Row Name 06/12/22 1228          Sensory Assessment (Somatosensory)    Sensory Assessment (Somatosensory) sensation intact  -           User Key  (r) = Recorded By, (t) = Taken By, (c) = Cosigned By    Initials Name Provider Type     Mel Awad, ROSINA Physical Therapist               Goals/Plan     Row Name 06/12/22 1231          Bed Mobility Goal 1 (PT)    Activity/Assistive Device (Bed Mobility Goal 1, PT) bed mobility activities, all  -     [place] Level/Cues Needed (Bed Mobility Goal 1, PT) modified independence  -     Time Frame (Bed Mobility Goal 1, PT) long term goal (LTG);2 weeks  -     Row Name 06/12/22 1231          Transfer Goal 1 (PT)    Activity/Assistive  Device (Transfer Goal 1, PT) sit-to-stand/stand-to-sit;bed-to-chair/chair-to-bed  -     [place] Level/Cues Needed (Transfer Goal 1, PT) supervision required  -     Time Frame (Transfer Goal 1, PT) long term goal (LTG);2 weeks  -     Row Name 06/12/22 1231          Gait Training Goal 1 (PT)    Activity/Assistive Device (Gait Training Goal 1, PT) gait (walking locomotion);assistive device use;walker, rolling  -     [place] Level (Gait Training Goal 1, PT) contact guard required  -     Distance (Gait Training Goal 1, PT) 100'  -     Time Frame (Gait Training Goal 1, PT) long term goal (LTG);2 weeks  -     Row Name 06/12/22 1231          Therapy Assessment/Plan (PT)    Planned Therapy Interventions (PT) balance training;bed mobility training;gait training;transfer training;ROM (range of motion);strengthening;patient/family education  -           User Key  (r) = Recorded By, (t) = Taken By, (c) = Cosigned By    Initials Name Provider Type     Mel Awad, PT Physical Therapist               Clinical Impression     Row Name 06/12/22 1229          Pain    Pretreatment Pain Rating 0/10 - no pain  -     Posttreatment Pain Rating 0/10 - no pain  -[facility] Name 06/12/22 1229          Plan of Care Review    Plan of Care Reviewed With patient  -     Outcome Evaluation 88 yo female admitted with hyponatremia.  Pt was COVID (+) on 5/27 and is from rehab.  Pt with recent d/o aggressive bladder CA and priro to rehab stay was living alone.  Pt presents with generalized weakness and limited mobility with gait and balance deficits putting her at risk for falls.  Will follow 3x/week and recommend continued rehab with possible need to transition to assisted living or ECF.  -     Row Name 06/12/22 1227          Therapy Assessment/Plan (PT)    Rehab Potential (PT) good, to achieve stated therapy goals  -     Criteria for Skilled Interventions Met (PT) yes;skilled treatment is necessary  -      Therapy Frequency (PT) 3 times/wk  -     Row Name 06/12/22 1229          Vital Signs    O2 Delivery Pre Treatment supplemental O2  2L, same as home O2 needs  -     O2 Delivery Intra Treatment supplemental O2  -     O2 Delivery Post Treatment supplemental O2  -     Row Name 06/12/22 1229          Positioning and Restraints    Pre-Treatment Position sitting in chair/recliner  -     Post Treatment Position chair  -     In Chair reclined;call light within reach;encouraged to call for assist;exit alarm on  -           User Key  (r) = Recorded By, (t) = Taken By, (c) = Cosigned By    Initials Name Provider Type     Mel Awad, PT Physical Therapist               Outcome Measures     Row Name 06/12/22 1232          How much help from another person do you currently need...    Turning from your back to your side while in flat bed without using bedrails? 3  -JH     Moving from lying on back to sitting on the side of a flat bed without bedrails? 3  -JH     Moving to and from a bed to a chair (including a wheelchair)? 3  -     Standing up from a chair using your arms (e.g., wheelchair, bedside chair)? 3  -     Climbing 3-5 steps with a railing? 2  -JH     To walk in hospital room? 3  -     AM-PAC 6 Clicks Score (PT) 17  -     Highest level of mobility 5 --> Static standing  -     Row Name 06/12/22 1232          Functional Assessment    Outcome Measure Options AM-PAC 6 Clicks Basic Mobility (PT)  -           User Key  (r) = Recorded By, (t) = Taken By, (c) = Cosigned By    Initials Name Provider Type    Mel James, ROSINA Physical Therapist                             Physical Therapy Education                 Title: PT OT SLP Therapies (In Progress)     Topic: Physical Therapy (Done)     Point: Mobility training (Done)     Learning Progress Summary           Patient Acceptance, E,TB, VU by  at 6/12/2022 1235                               User Key     Initials Effective Dates Name Provider  Type Discipline     06/16/21 -  Mel Awad PT Physical Therapist PT              PT Recommendation and Plan  Planned Therapy Interventions (PT): balance training, bed mobility training, gait training, transfer training, ROM (range of motion), strengthening, patient/family education  Plan of Care Reviewed With: patient  Outcome Evaluation: 89 yo female admitted with hyponatremia.  Pt was COVID (+) on 5/27 and is from rehab.  Pt with recent d/o aggressive bladder CA and priro to rehab stay was living alone.  Pt presents with generalized weakness and limited mobility with gait and balance deficits putting her at risk for falls.  Will follow 3x/week and recommend continued rehab with possible need to transition to assisted living or ECF.     Time Calculation:    PT Charges     Row Name 06/12/22 1236             Time Calculation    Start Time 1004  -      Stop Time 1029  -      Time Calculation (min) 25 min  -      PT Received On 06/12/22  -      PT - Next Appointment 06/14/22  -      PT Goal Re-Cert Due Date 06/26/22  -              Time Calculation- PT    Total Timed Code Minutes- PT 8 minute(s)  -            User Key  (r) = Recorded By, (t) = Taken By, (c) = Cosigned By    Initials Name Provider Type     Mel Awad PT Physical Therapist              Therapy Charges for Today     Code Description Service Date Service Provider Modifiers Qty    34953912381 HC PT EVAL LOW COMPLEXITY 3 6/12/2022 Mel Awad, PT GP 1    73548274834 HC GAIT TRAINING EA 15 MIN 6/12/2022 Mel Awad, PT GP 1          PT G-Codes  Outcome Measure Options: AM-PAC 6 Clicks Basic Mobility (PT)  AM-PAC 6 Clicks Score (PT): 17    Mel Awad PT  6/12/2022

## 2022-06-12 NOTE — PLAN OF CARE
Goal Outcome Evaluation:  Plan of Care Reviewed With: patient           Outcome Evaluation: 88 yo female admitted with hyponatremia.  Pt was COVID (+) on 5/27 and is from rehab.  Pt with recent d/o aggressive bladder CA. Pt to rehab pt was living alone. At time of evaluation, pt A&0X4. Pt required min A to stand, max A for LB dressing, and min A for toileting. Pt below baseline and would benefit from ADRIENNE.

## 2022-06-12 NOTE — PLAN OF CARE
Goal Outcome Evaluation:  Plan of Care Reviewed With: patient           Outcome Evaluation: Patient rested well during the day shift. no complaints. Family stayed with patient most of time. taking pills without problems. RN communicated with family and CM about possible placement upon discharge. will continue to monitor.

## 2022-06-12 NOTE — CASE MANAGEMENT/SOCIAL WORK
Continued Stay Note  [facility]     Patient Name: Devon Crane  MRN: [id number]  Today's Date: 6/12/2022    Admit Date: 6/10/2022     Discharge Plan     Row Name 06/12/22 1625       Plan    Plan Referral placed to [facility]; pending acceptance. No precert needed. PASRR per facility.    Patient/Family in Agreement with Plan yes    Plan Comments Per Callie with [facility]; clinically she is fine for their facility. However, they will be reviewing treatments and costs where she is planning to recieve bladder CA treatment, before they can accept. CM discussed with MD via phone.              Phone communication or documentation only - no physical contact with patient or family.    Radha Rojo RN  Weekend   [place] [id number]  Office: [phone]  Fax: [phone]  [email or web address]

## 2022-06-12 NOTE — PROGRESS NOTES
[facility] Medicine Services Daily Progress Note    Patient Name: Devon Crane  : 1932  MRN: [id number]  Primary Care Physician:  Yelitza Mcdaniel MD  Date of admission: 6/10/2022      Subjective      Chief Complaint: [place] unwell with hyponatremia.    Review of Systems   Constitutional:        Patient's appetite has improved.  The patient overall is doing much better.  No new complaints.   HENT: Negative.    Eyes: Negative.    Cardiovascular: Negative.    Respiratory: Negative.    Endocrine: Negative.    Hematologic/Lymphatic: Negative.    Skin: Negative.    Musculoskeletal: Negative.    Gastrointestinal: Negative.    Genitourinary: Positive for frequency.   Neurological: Positive for weakness.   Psychiatric/Behavioral: Negative.    Allergic/Immunologic: Negative.    All other systems reviewed and are negative.    2022  His midodrine has been increased today the patient was up feeding herself breakfast.  She was conversational and appropriate.  Objective      Vitals:   Temp:  [97.5 °F (36.4 °C)-97.8 °F (36.6 °C)] 97.5 °F (36.4 °C)  Heart Rate:  [66-78] 78  Resp:  [16] 16  BP: (102-150)/(56-72) 102/56  Flow (L/min):  [1.5] 1.5    Physical Exam  Vitals and nursing note reviewed.   Constitutional:       General: She is not in acute distress.     Appearance: Normal appearance. She is well-developed. She is not ill-appearing, toxic-appearing or diaphoretic.      Comments: The patient is very appropriate.  She is very slender and a bit on the pale side.  She is conversational and appropriate.  Patient was eating breakfast when I saw her today and doing well on her own.     HENT:      Head: Normocephalic and atraumatic.      Right Ear: Ear canal and external ear normal.      Left Ear: Ear canal and external ear normal.      Nose: Nose normal. No congestion or rhinorrhea.      Mouth/Throat:      Mouth: Mucous membranes are moist.      Pharynx: No oropharyngeal exudate.   Eyes:       General: No scleral icterus.        Right eye: No discharge.         Left eye: No discharge.      Extraocular Movements: Extraocular movements intact.      Conjunctiva/sclera: Conjunctivae normal.      Pupils: Pupils are equal, round, and reactive to light.   Neck:      Thyroid: No thyromegaly.      Vascular: No carotid bruit or JVD.      Trachea: No tracheal deviation.   Cardiovascular:      Rate and Rhythm: Normal rate and regular rhythm.      Pulses: Normal pulses.      Heart sounds: Normal heart sounds. No murmur heard.    No friction rub. No gallop.   Pulmonary:      Effort: Pulmonary effort is normal. No respiratory distress.      Breath sounds: Normal breath sounds. No stridor. No wheezing, rhonchi or rales.   Chest:      Chest wall: No tenderness.   Abdominal:      General: Bowel sounds are normal. There is no distension.      Palpations: Abdomen is soft. There is no mass.      Tenderness: There is no abdominal tenderness. There is no guarding or rebound.      Hernia: No hernia is present.   Musculoskeletal:         General: No swelling, tenderness, deformity or signs of injury. Normal range of motion.      Cervical back: Normal range of motion and neck supple. No rigidity. No muscular tenderness.      Right lower leg: No edema.      Left lower leg: No edema.   Lymphadenopathy:      Cervical: No cervical adenopathy.   Skin:     General: Skin is warm and dry.      Coloration: Skin is not jaundiced or pale.      Findings: No bruising, erythema or rash.   Neurological:      General: No focal deficit present.      Mental Status: She is alert and oriented to person, place, and time. Mental status is at baseline.      Cranial Nerves: No cranial nerve deficit.      Sensory: No sensory deficit.      Motor: No weakness or abnormal muscle tone.      Coordination: Coordination normal.      Comments: Patient is very pleasant and doing much better.  She does remain diffusely weak.   Psychiatric:         Mood and Affect:  Mood normal.         Behavior: Behavior normal.         Thought Content: Thought content normal.         Judgment: Judgment normal.      Result Review    Result Review:  I have personally reviewed the results from the time of this admission to 6/12/2022 15:02 EDT and agree with these findings:  [x]  Laboratory  [x]  Microbiology  [x]  Radiology  []  EKG/Telemetry   []  Cardiology/Vascular   []  Pathology  []  Old records  []  Other:  Most notable findings include:     Wounds (last 24 hours)     LDA Wound     Row Name 06/12/22 0759 06/11/22 1959          Wound 06/10/22 2210 medial coccyx Pressure Injury    Wound - Properties Group Placement Date: 06/10/22  -DW Placement Time: 2210 -DW Present on Hospital Admission: Y  -DW Orientation: medial  -DW Location: coccyx  -DW Primary Wound Type: Pressure inj  -DW     Dressing Appearance -- intact;dry  -MG     Closure -- OPHELIA  -MG     Base pink;red  -MG dressing in place, unable to visualize  -MG     Retired Wound - Properties Group Placement Date: 06/10/22  -DW Placement Time: 2210 -DW Present on Hospital Admission: Y  -DW Orientation: medial  -DW Location: coccyx  -DW Primary Wound Type: Pressure inj  -DW     Retired Wound - Properties Group Date first assessed: 06/10/22  -DW Time first assessed: 2210 -DW Present on Hospital Admission: Y  -DW Location: coccyx  -DW Primary Wound Type: Pressure inj  -DW           User Key  (r) = Recorded By, (t) = Taken By, (c) = Cosigned By    Initials Name Provider Type    MG Joann Pagan LPN Licensed Nurse    Lizette Burrell RN Registered Nurse              Assessment & Plan      Brief Patient Summary:  Devon Crane is a 89 y.o. female who was in an inpatient rehab center who developed increasing problems with not feeling well.  The patient has recently been diagnosed with an aggressive form of bladder cancer and has had 3 hospitalizations for urinary tract infection she does not seem to be able to shake.  The patient had  hyponatremia and was transferred from the rehab center to [facility] for ongoing treatment.  The patient is feeling better today but remains sort of quiet.  Her daughter is at the bedside and was updated.    atorvastatin, 10 mg, Oral, Daily  lansoprazole, 30 mg, Nasogastric, QAM  levothyroxine, 50 mcg, Oral, Daily  linezolid, 600 mg, Oral, Q12H  multivitamin with minerals, 1 tablet, Oral, Daily  rivaroxaban, 15 mg, Oral, Daily With Dinner  sodium chloride, 10 mL, Intravenous, Q12H           Active Hospital Problems:  Active Hospital Problems    Diagnosis    • Acute hyponatremia      Plan:   1.  Asymptomatic COVID-19  -Patient is normally on 2 L/min per nasal cannula of oxygen and still is.  She has not had any desaturations.  -The patient has been positive for approximately 10 to 11 days according to the family.  She is out of the window for treatment for COVID and she is asymptomatic without any issues related to this  -We will isolate as per hospital protocol     2.  Urinary tract infection  -Patient most recently had a urinary tract infection with E. coli.  It was pansensitive.  Rocephin has been started and we will continue it until culture data dictates otherwise.  No positive cultures today.  -Cautious hydration given that the patient's sodium is 124.  -The patient has no positive cultures as of yet.  Continue with the Rocephin empirically which can likely be transitioned to p.o. for discharge.    3.  Hyponatremia  -Is not the patient's first bout of hyponatremia.  The last 2 times that she was hospitalized her sodium was below 125.  The patient's urinary sodium was 26.  We will cautiously give normal saline rechecking her basic metabolic profile in the a.m.  -Consider nephrology consult if this continues to be a problem       -patient serum sodium is up to 131.  We will decrease her IV fluids and recheck in the a.m.  -Patient's sodium is up to 134.  Her IV fluids have been discontinued.    4.  New  "diagnosis of \"aggressive\" bladder cancer  -The patient's daughter explained to me that they are considering doing aggressive radiation therapy if her mother is strong enough for it.  They have not started treatment and have meeting set up in the near future to better deal with this.     In the presence of the patient's daughter I did ask her mother who was completely lucid and competent whether or not she wanted CPR, cardioversion, chemical code or mechanical ventilation.  Her mother is adamant that she wants none of it and her CODE STATUS order has been placed to reflect her wishes.    DVT prophylaxis:  Medical DVT prophylaxis orders are present.    CODE STATUS:    Level Of Support Discussed With: Patient  Code Status (Patient has no pulse and is not breathing): No CPR (Do Not Attempt to Resuscitate)  Medical Interventions (Patient has pulse or is breathing): Full Support      Disposition:  I expect patient to be discharged .    This patient has been examined wearing appropriate Personal Protective Equipment and discussed with hospital infection control department. 06/12/22      Electronically signed by Michelle Castrejon MD, 06/12/22, 15:02 EDT.  [facility] Hospitalist Team           "

## 2022-06-13 NOTE — NURSING NOTE
89-year-old female presented to the hospital with altered mental status changes.  Patient currently is COVID confirmed.  Patient chart reviewed photos on chart noted recommend to continue with pressure injury prevention strategies, no open areas at this time

## 2022-06-13 NOTE — CASE MANAGEMENT/SOCIAL WORK
Case Management Discharge Note      Final Note: [facility]         Selected Continued Care - Discharged on 6/13/2022 Admission date: 6/10/2022 - Discharge disposition: Skilled Nursing Facility (DC - External)    Destination Coordination complete.    Service Provider Selected Services Address Phone Fax Patient Preferred    [facility]  Skilled Nursing [address] [facility] [place] [address] [phone] --         Transportation Services  Private: Car    Final Discharge Disposition Code: 03 - skilled nursing facility (SNF)

## 2022-06-13 NOTE — PLAN OF CARE
Goal Outcome Evaluation:  Plan of Care Reviewed With: patient           Outcome Evaluation: Pt rested throughout the night, no c/o pain or discomfort. Pt. is receiving abx at this time. Currently waiting on placement to [facility]. Will continue to monitor.

## 2022-06-13 NOTE — PROGRESS NOTES
[facility] Daily Progress Note    Patient Name: Devon Crane  : 1932  MRN: [id number]  Primary Care Physician:  Yelitza Mcdaniel MD  Date of admission: 6/10/2022      Subjective      Chief Complaint: [place] unwell with hyponatremia.  Sick/13  Patient seen and examined  Vital signs reviewed  Mild hyponatremia seems to be asymptomatic  Chronic anemia has worsened today to 8.8  Most with thrombocytopenia 127 noted today  Blood cultures negative to date    Review of Systems   Constitutional:        Patient's appetite has improved.  The patient overall is doing much better.  No new complaints.   HENT: Negative.    Eyes: Negative.    Cardiovascular: Negative.    Respiratory: Negative.    Endocrine: Negative.    Hematologic/Lymphatic: Negative.    Skin: Negative.    Musculoskeletal: Negative.    Gastrointestinal: Negative.    Genitourinary: Positive for frequency.   Neurological: Positive for weakness.   Psychiatric/Behavioral: Negative.    Allergic/Immunologic: Negative.    All other systems reviewed and are negative.    2022  His midodrine has been increased today the patient was up feeding herself breakfast.  She was conversational and appropriate.  Objective      Vitals:   Temp:  [97.4 °F (36.3 °C)-97.8 °F (36.6 °C)] 97.6 °F (36.4 °C)  Heart Rate:  [68-94] 83  Resp:  [16-18] 16  BP: (101-106)/(55-70) 106/56  Flow (L/min):  [1-1.5] 1    Physical Exam  Vitals and nursing note reviewed.   Constitutional:       General: She is not in acute distress.     Appearance: Normal appearance. She is well-developed. She is not ill-appearing, toxic-appearing or diaphoretic.      Comments: The patient is very appropriate.  She is very slender and a bit on the pale side.  She is conversational and appropriate.  Patient was eating breakfast when I saw her today and doing well on her own.     HENT:      Head: Normocephalic and atraumatic.      Right Ear: Ear canal and external ear  normal.      Left Ear: Ear canal and external ear normal.      Nose: Nose normal. No congestion or rhinorrhea.      Mouth/Throat:      Mouth: Mucous membranes are moist.      Pharynx: No oropharyngeal exudate.   Eyes:      General: No scleral icterus.        Right eye: No discharge.         Left eye: No discharge.      Extraocular Movements: Extraocular movements intact.      Conjunctiva/sclera: Conjunctivae normal.      Pupils: Pupils are equal, round, and reactive to light.   Neck:      Thyroid: No thyromegaly.      Vascular: No carotid bruit or JVD.      Trachea: No tracheal deviation.   Cardiovascular:      Rate and Rhythm: Normal rate and regular rhythm.      Pulses: Normal pulses.      Heart sounds: Normal heart sounds. No murmur heard.    No friction rub. No gallop.   Pulmonary:      Effort: Pulmonary effort is normal. No respiratory distress.      Breath sounds: Normal breath sounds. No stridor. No wheezing, rhonchi or rales.   Chest:      Chest wall: No tenderness.   Abdominal:      General: Bowel sounds are normal. There is no distension.      Palpations: Abdomen is soft. There is no mass.      Tenderness: There is no abdominal tenderness. There is no guarding or rebound.      Hernia: No hernia is present.   Musculoskeletal:         General: No swelling, tenderness, deformity or signs of injury. Normal range of motion.      Cervical back: Normal range of motion and neck supple. No rigidity. No muscular tenderness.      Right lower leg: No edema.      Left lower leg: No edema.   Lymphadenopathy:      Cervical: No cervical adenopathy.   Skin:     General: Skin is warm and dry.      Coloration: Skin is not jaundiced or pale.      Findings: No bruising, erythema or rash.   Neurological:      General: No focal deficit present.      Mental Status: She is alert and oriented to person, place, and time. Mental status is at baseline.      Cranial Nerves: No cranial nerve deficit.      Sensory: No sensory deficit.       Motor: No weakness or abnormal muscle tone.      Coordination: Coordination normal.      Comments: Patient is very pleasant and doing much better.  She does remain diffusely weak.   Psychiatric:         Mood and Affect: Mood normal.         Behavior: Behavior normal.         Thought Content: Thought content normal.         Judgment: Judgment normal.      Result Review    Result Review:  I have personally reviewed the results from the time of this admission to 6/13/2022 11:39 EDT and agree with these findings:  [x]  Laboratory  [x]  Microbiology  [x]  Radiology  []  EKG/Telemetry   []  Cardiology/Vascular   []  Pathology  []  Old records  []  Other:  Most notable findings include:     Wounds (last 24 hours)     LDA Wound     Row Name 06/13/22 0745 06/12/22 1959          Wound 06/10/22 2210 medial coccyx Pressure Injury    Wound - Properties Group Placement Date: 06/10/22  -DW Placement Time: 2210 -DW Present on Hospital Admission: Y  -DW Orientation: medial  -DW Location: coccyx  -DW Primary Wound Type: Pressure inj  -DW     Dressing Appearance open to air  -HJ dry;intact  -MG     Closure Open to air  -HJ OPHELIA  -MG     Base -- dressing in place, unable to visualize  -MG     Dressing Care skin barrier agent applied  -HJ --     Retired Wound - Properties Group Placement Date: 06/10/22  -DW Placement Time: 2210 -DW Present on Hospital Admission: Y  -DW Orientation: medial  -DW Location: coccyx  -DW Primary Wound Type: Pressure inj  -DW     Retired Wound - Properties Group Date first assessed: 06/10/22  -DW Time first assessed: 2210 -DW Present on Hospital Admission: Y  -DW Location: coccyx  -DW Primary Wound Type: Pressure inj  -DW           User Key  (r) = Recorded By, (t) = Taken By, (c) = Cosigned By    Initials Name Provider Type    Yolanda Fink RN Registered Nurse    Joann Stephens LPN Licensed Nurse    Lizette Burrell, MARIBEL Registered Nurse              Assessment & Plan      Brief Patient  Summary:  Devon Crane is a 89 y.o. female who was in an inpatient rehab center who developed increasing problems with not feeling well.  The patient has recently been diagnosed with an aggressive form of bladder cancer and has had 3 hospitalizations for urinary tract infection she does not seem to be able to shake.  The patient had hyponatremia and was transferred from the rehab center to [facility] for ongoing treatment.  The patient is feeling better today but remains sort of quiet.  Her daughter is at the bedside and was updated.    atorvastatin, 10 mg, Oral, Daily  cefTRIAXone, 1 g, Intravenous, Q24H  lansoprazole, 30 mg, Nasogastric, QAM  levothyroxine, 50 mcg, Oral, Daily  linezolid, 600 mg, Oral, Q12H  multivitamin with minerals, 1 tablet, Oral, Daily  rivaroxaban, 15 mg, Oral, Daily With Dinner  sodium chloride, 10 mL, Intravenous, Q12H           Active Hospital Problems:  Active Hospital Problems    Diagnosis    • Acute hyponatremia      Plan:   1.  Asymptomatic COVID-19  -Patient is normally on 2 L/min per nasal cannula of oxygen and still is.  She has not had any desaturations.  -The patient has been positive for approximately 10 to 11 days according to the family.  She is out of the window for treatment for COVID and she is asymptomatic without any issues related to this  -We will isolate as per hospital protocol     2.  Urinary tract infection  Recent E. coli bacteremia5/13/2020 2 repeat cultures on 6/11 pending but so far negative  Urine culture this admission is currently pending since 10 Karina  -Patient most recently had a urinary tract infection with E. coli.  It was pansensitive.  Rocephin has been started and we will continue it until culture data dictates otherwise.  No positive cultures today.  -Cautious hydration given that the patient's sodium is 124.  -The patient has no positive cultures as of yet.  Continue with the Rocephin empirically which can likely be transitioned to p.o. for  "discharge.    3.  Hyponatremia  -Is not the patient's first bout of hyponatremia.  The last 2 times that she was hospitalized her sodium was below 125.  The patient's urinary sodium was 26.  We will cautiously give normal saline rechecking her basic metabolic profile in the a.m.  -Consider nephrology consult if this continues to be a problem       -patient serum sodium is up to 131.  We will decrease her IV fluids and recheck in the a.m.  -Patient's sodium is up to 134.  Her IV fluids have been discontinued.    4.  New diagnosis of \"aggressive\" bladder cancer  -The patient's daughter explained to me that they are considering doing aggressive radiation therapy if her mother is strong enough for it.  They have not started treatment and have meeting set up in the near future to better deal with this.     According to previous provider evaluations: In the presence of the patient's daughter, Dr. Castrejon did ask her mother who was completely lucid and competent whether or not she wanted CPR, cardioversion, chemical code or mechanical ventilation.  Her mother is adamant that she wants none of it and her CODE STATUS order has been placed to reflect her wishes.     DVT prophylaxis:  Medical DVT prophylaxis orders are present.    CODE STATUS:    Level Of Support Discussed With: Patient  Code Status (Patient has no pulse and is not breathing): No CPR (Do Not Attempt to Resuscitate)  Medical Interventions (Patient has pulse or is breathing): Full Support      Disposition:  I expect patient to be discharged .    This patient has been examined wearing appropriate Personal Protective Equipment and discussed with hospital infection control department. 06/13/22      Electronically signed by Cristiano Sweeney MD, 06/13/22, 11:39 EDT.  [facility] Hospitalist Team           "

## 2022-06-14 NOTE — TELEPHONE ENCOUNTER
Summer at [facility] called requested order to treat external hemorrhoid  And also taking a daily vitamin and a vitamin B complex that is in the daily vitamin questions if she need both.      Per Dr Mcdaniel use vikciecks for hemorrhoids and just use daily one a day vitamin. Order given to D/C vitamin B complex.

## 2022-06-24 PROBLEM — C67.9 CARCINOMA OF BLADDER (HCC): Status: ACTIVE | Noted: 2022-01-01

## 2022-06-24 PROBLEM — J96.11 CHRONIC RESPIRATORY FAILURE WITH HYPOXIA (HCC): Status: ACTIVE | Noted: 2022-01-01

## 2022-06-24 PROBLEM — N39.0 RECURRENT UTI: Status: ACTIVE | Noted: 2022-01-01

## 2022-06-24 PROBLEM — R31.9 HEMATURIA: Status: ACTIVE | Noted: 2022-01-01

## 2022-06-28 NOTE — TELEPHONE ENCOUNTER
Spoke with Mary at [facility] she ask if  Barrios cath can be removed, NO  Per Dr Mcdaniel , also   Ask if they could take out pick line since she  has a center line in. Yes, per Dr Mcdaniel

## 2022-06-28 NOTE — TELEPHONE ENCOUNTER
Spoke with Kari at [facility], per family request, Dr Mcdaniel is ordering comfort care only and request for Hospice Kari  will have  contact hospice.

## 2022-06-28 NOTE — TELEPHONE ENCOUNTER
Spoke with Kari at [facility] per Dr Mcdaniel continue all medications as ordered

## 2022-06-29 NOTE — TELEPHONE ENCOUNTER
Caller: TRACY    Relationship: [facility] call back number: [phone]    ASK FOR TRACY OR MISHEL NURSE     Who are you requesting to speak with (clinical staff, provider,  specific staff member): FARHAD SCHEDULING     What was the call regarding: CALLING TO REQUEST FOLLOW UP FOR PATIENT SHE WAS SEEN IN THE HOSPITAL     REQUESTING FOR     07/06 NEED AROUND 12:45 APPT TIME       Do you require a callback: YES TO ADVISE

## 2022-07-21 NOTE — TELEPHONE ENCOUNTER
Binta with [facility] called requesting status of 3 orders that were faxed over. Please return to fax number [phone]

## 2022-09-07 PROBLEM — J43.2 CENTRILOBULAR EMPHYSEMA (HCC): Status: RESOLVED | Noted: 2018-08-10 | Resolved: 2022-01-01

## 2022-09-07 PROBLEM — D46.9 MDS (MYELODYSPLASTIC SYNDROME) (HCC): Status: RESOLVED | Noted: 2019-07-10 | Resolved: 2022-01-01

## 2022-09-07 PROBLEM — N39.0 ACUTE UTI (URINARY TRACT INFECTION): Status: RESOLVED | Noted: 2022-01-01 | Resolved: 2022-01-01

## 2022-09-07 PROBLEM — E87.1 ACUTE HYPONATREMIA: Status: RESOLVED | Noted: 2022-01-01 | Resolved: 2022-01-01

## 2022-09-07 PROBLEM — N17.9 AKI (ACUTE KIDNEY INJURY) (HCC): Status: RESOLVED | Noted: 2022-01-01 | Resolved: 2022-01-01

## 2022-09-07 PROBLEM — C67.9 CARCINOMA OF BLADDER (HCC): Status: RESOLVED | Noted: 2022-01-01 | Resolved: 2022-01-01
